# Patient Record
Sex: FEMALE | Race: WHITE | NOT HISPANIC OR LATINO | Employment: UNEMPLOYED | URBAN - METROPOLITAN AREA
[De-identification: names, ages, dates, MRNs, and addresses within clinical notes are randomized per-mention and may not be internally consistent; named-entity substitution may affect disease eponyms.]

---

## 2023-09-12 ENCOUNTER — HOSPITAL ENCOUNTER (OUTPATIENT)
Dept: DATA CONVERSION | Facility: HOSPITAL | Age: 83
End: 2023-09-12

## 2023-09-12 DIAGNOSIS — M17.12 UNILATERAL PRIMARY OSTEOARTHRITIS, LEFT KNEE: ICD-10-CM

## 2023-09-27 PROBLEM — E55.9 VITAMIN D DEFICIENCY: Status: ACTIVE | Noted: 2023-09-27

## 2023-09-27 PROBLEM — M51.37 DEGENERATIVE DISC DISEASE AT L5-S1 LEVEL: Status: ACTIVE | Noted: 2023-09-27

## 2023-09-27 PROBLEM — M54.30 SCIATICA: Status: ACTIVE | Noted: 2023-09-27

## 2023-09-27 PROBLEM — R26.9 ABNORMAL GAIT: Status: ACTIVE | Noted: 2023-09-27

## 2023-09-27 PROBLEM — G95.0 SYRINX (MULTI): Status: ACTIVE | Noted: 2023-09-27

## 2023-09-27 PROBLEM — E78.5 HYPERLIPIDEMIA: Status: ACTIVE | Noted: 2023-09-27

## 2023-09-27 PROBLEM — M17.0 PRIMARY OSTEOARTHRITIS OF BOTH KNEES: Status: ACTIVE | Noted: 2023-09-27

## 2023-09-27 PROBLEM — M89.9 DISORDER OF BONE: Status: ACTIVE | Noted: 2023-09-27

## 2023-09-27 PROBLEM — E78.00 ELEVATED LDL CHOLESTEROL LEVEL: Status: ACTIVE | Noted: 2023-09-27

## 2023-09-27 PROBLEM — M51.379 DEGENERATIVE DISC DISEASE AT L5-S1 LEVEL: Status: ACTIVE | Noted: 2023-09-27

## 2023-09-27 RX ORDER — DICLOFENAC SODIUM 10 MG/G
GEL TOPICAL
COMMUNITY
Start: 2021-04-12 | End: 2023-10-11 | Stop reason: ALTCHOICE

## 2023-09-27 RX ORDER — ERGOCALCIFEROL 1.25 MG/1
1 CAPSULE ORAL
COMMUNITY
Start: 2019-02-27 | End: 2023-10-18 | Stop reason: ALTCHOICE

## 2023-09-27 RX ORDER — DICLOFENAC SODIUM 10 MG/G
GEL TOPICAL 2 TIMES DAILY
COMMUNITY
Start: 2020-10-12 | End: 2023-10-11 | Stop reason: ALTCHOICE

## 2023-09-27 RX ORDER — MELOXICAM 7.5 MG/1
7.5 TABLET ORAL DAILY
COMMUNITY
Start: 2021-01-14 | End: 2024-02-05 | Stop reason: WASHOUT

## 2023-09-27 RX ORDER — LORATADINE 10 MG/1
10 TABLET ORAL DAILY
COMMUNITY
Start: 2021-04-12 | End: 2024-01-30

## 2023-09-27 RX ORDER — FUROSEMIDE 20 MG/1
20 TABLET ORAL DAILY
COMMUNITY
Start: 2021-01-14 | End: 2023-10-18 | Stop reason: ALTCHOICE

## 2023-10-11 ENCOUNTER — ANCILLARY PROCEDURE (OUTPATIENT)
Dept: PREADMISSION TESTING | Facility: HOSPITAL | Age: 83
End: 2023-10-11
Payer: COMMERCIAL

## 2023-10-11 VITALS
TEMPERATURE: 96.6 F | RESPIRATION RATE: 16 BRPM | DIASTOLIC BLOOD PRESSURE: 89 MMHG | OXYGEN SATURATION: 98 % | WEIGHT: 158.73 LBS | BODY MASS INDEX: 31.16 KG/M2 | HEIGHT: 60 IN | SYSTOLIC BLOOD PRESSURE: 151 MMHG | HEART RATE: 94 BPM

## 2023-10-11 DIAGNOSIS — Z01.818 PREOPERATIVE TESTING: ICD-10-CM

## 2023-10-11 DIAGNOSIS — E11.9 TYPE 2 DIABETES MELLITUS WITHOUT COMPLICATION, WITHOUT LONG-TERM CURRENT USE OF INSULIN (MULTI): ICD-10-CM

## 2023-10-11 LAB
ANION GAP SERPL CALC-SCNC: 10 MMOL/L
APPEARANCE UR: CLEAR
BILIRUB UR STRIP.AUTO-MCNC: NEGATIVE MG/DL
BUN SERPL-MCNC: 15 MG/DL (ref 8–25)
CALCIUM SERPL-MCNC: 9.4 MG/DL (ref 8.5–10.4)
CHLORIDE SERPL-SCNC: 100 MMOL/L (ref 97–107)
CO2 SERPL-SCNC: 26 MMOL/L (ref 24–31)
COLOR UR: COLORLESS
CREAT SERPL-MCNC: 0.8 MG/DL (ref 0.4–1.6)
ERYTHROCYTE [DISTWIDTH] IN BLOOD BY AUTOMATED COUNT: 14.6 % (ref 11.5–14.5)
EST. AVERAGE GLUCOSE BLD GHB EST-MCNC: 126 MG/DL
GFR SERPL CREATININE-BSD FRML MDRD: 73 ML/MIN/1.73M*2
GLUCOSE SERPL-MCNC: 115 MG/DL (ref 65–99)
GLUCOSE UR STRIP.AUTO-MCNC: NORMAL MG/DL
HBA1C MFR BLD: 6 %
HCT VFR BLD AUTO: 41.3 % (ref 36–46)
HGB BLD-MCNC: 13.2 G/DL (ref 12–16)
KETONES UR STRIP.AUTO-MCNC: NEGATIVE MG/DL
LEUKOCYTE ESTERASE UR QL STRIP.AUTO: NEGATIVE
MCH RBC QN AUTO: 27 PG (ref 26–34)
MCHC RBC AUTO-ENTMCNC: 32 G/DL (ref 32–36)
MCV RBC AUTO: 85 FL (ref 80–100)
NITRITE UR QL STRIP.AUTO: NEGATIVE
NRBC BLD-RTO: 0 /100 WBCS (ref 0–0)
PH UR STRIP.AUTO: 6.5 [PH]
PLATELET # BLD AUTO: 322 X10*3/UL (ref 150–450)
PMV BLD AUTO: 10.4 FL (ref 7.5–11.5)
POTASSIUM SERPL-SCNC: 4.2 MMOL/L (ref 3.4–5.1)
PROT UR STRIP.AUTO-MCNC: NEGATIVE MG/DL
RBC # BLD AUTO: 4.89 X10*6/UL (ref 4–5.2)
RBC # UR STRIP.AUTO: ABNORMAL /UL
RBC #/AREA URNS AUTO: NORMAL /HPF
SODIUM SERPL-SCNC: 136 MMOL/L (ref 133–145)
SP GR UR STRIP.AUTO: 1
UROBILINOGEN UR STRIP.AUTO-MCNC: NORMAL MG/DL
WBC # BLD AUTO: 5.8 X10*3/UL (ref 4.4–11.3)
WBC #/AREA URNS AUTO: NORMAL /HPF

## 2023-10-11 PROCEDURE — 87081 CULTURE SCREEN ONLY: CPT | Mod: CMCLAB,WESLAB

## 2023-10-11 PROCEDURE — 36415 COLL VENOUS BLD VENIPUNCTURE: CPT

## 2023-10-11 PROCEDURE — 81001 URINALYSIS AUTO W/SCOPE: CPT

## 2023-10-11 PROCEDURE — 85027 COMPLETE CBC AUTOMATED: CPT

## 2023-10-11 PROCEDURE — 83036 HEMOGLOBIN GLYCOSYLATED A1C: CPT

## 2023-10-11 PROCEDURE — 93005 ELECTROCARDIOGRAM TRACING: CPT

## 2023-10-11 PROCEDURE — 80048 BASIC METABOLIC PNL TOTAL CA: CPT

## 2023-10-11 PROCEDURE — 93010 ELECTROCARDIOGRAM REPORT: CPT | Performed by: INTERNAL MEDICINE

## 2023-10-11 RX ORDER — CHLORHEXIDINE GLUCONATE ORAL RINSE 1.2 MG/ML
15 SOLUTION DENTAL ONCE
Qty: 30 ML | Refills: 0 | Status: SHIPPED | OUTPATIENT
Start: 2023-10-11 | End: 2023-10-26 | Stop reason: HOSPADM

## 2023-10-11 ASSESSMENT — ENCOUNTER SYMPTOMS
PSYCHIATRIC NEGATIVE: 1
BACK PAIN: 1
EYES NEGATIVE: 1
RESPIRATORY NEGATIVE: 1
JOINT SWELLING: 1
ARTHRALGIAS: 1
ACTIVITY CHANGE: 1
CARDIOVASCULAR NEGATIVE: 1
NEUROLOGICAL NEGATIVE: 1
GASTROINTESTINAL NEGATIVE: 1

## 2023-10-11 ASSESSMENT — CHADS2 SCORE
DIABETES: YES
PRIOR STROKE OR TIA OR THROMBOEMBOLISM: NO
CHF: NO
AGE GREATER THAN OR EQUAL TO 75: YES
HYPERTENSION: NO
CHADS2 SCORE: 2

## 2023-10-11 ASSESSMENT — PAIN SCALES - GENERAL: PAINLEVEL_OUTOF10: 5 - MODERATE PAIN

## 2023-10-11 ASSESSMENT — PAIN DESCRIPTION - DESCRIPTORS: DESCRIPTORS: SHARP

## 2023-10-11 ASSESSMENT — PAIN - FUNCTIONAL ASSESSMENT: PAIN_FUNCTIONAL_ASSESSMENT: 0-10

## 2023-10-11 NOTE — PREPROCEDURE INSTRUCTIONS
PAT DISCHARGE INSTRUCTIONS    Please call the Same Day Surgery Department of the hospital where your procedure will be performed after 2:00PM the day before your surgery. If you are scheduled on a Monday or a Tuesday following a Monday holiday, you will need to call on the last business day prior to your surgery date.      [facility]  [address] [place] [address]  [phone]    [facility]  [address] [place] [address] [phone]    [facility]  [address] [place] [address]  [phone]        Please let your surgeon know if:      You develop any open sores, shingles, burning or painful urination as these may increase your risk of an infection.   You no longer wish to have the surgery.   Any other personal circumstances change that may lead to the need to cancel or defer this surgery-such as being sick or getting admitted to any hospital within one week of your planned procedure.    Your contact details change, such as a change of address or phone number.    Starting now:     Please DO NOT drink alcohol or smoke for 24 before surgery. It is well known that quitting smoking can make a huge difference to your health and recovery from surgery. The longer you abstain from smoking, the better your chances of a healthy recovery. If you need help with quitting, call 1-800-QUIT-NOW to be connected to a trained counselor who will discuss the best methods to help you quit.       Before your surgery:     Please stop all supplements 7 days prior to surgery.    Please stop taking NSAID pain medicine such as Advil and Motrin 5 days before surgery or as instructed by your surgeon.   If you develop any fever, cough, cold, rashes, cuts, scratches, scrapes, urinary symptoms or infection anywhere on your body (including teeth and gums) prior to surgery, please call your surgeon’s office as soon as possible. This may require treatment to reduce the  chance of cancellation on the day of surgery.  Please use CHG wash and mouthwash as instructed on handouts    The day before your surgery:     DIET- Do not eat any solid food or drink after midnight the night before surgery. This includes gum, hard candy, mints and coffee.    Get a good night’s rest. Use the special soap for bathing if you have been instructed to use one.    Arrival time is typically 2 hours prior to the time of surgery.    Scheduled surgery times may change and you will be notified if this occurs - please check your personal voicemail for any updates.     On the morning of surgery:   Wear comfortable, loose fitting clothes which open in the front. Please do not wear moisturizers, creams, lotions, perfume or makeup.     Please bring with you to surgery:   Photo ID and insurance card   Current list of medicines and allergies   Pacemaker/ Defibrillator/Heart stent cards   CPAP machine and mask    Slings/ splints/ crutches   A copy of your complete advanced directive/DHPOA.    Please do NOT bring with you to surgery:   All jewelry and valuables should be left at home.   Prosthetic devices such as contact lenses, hearing aids, dentures, eyelash extensions, hairpins and body piercings must be removed prior to going in to the surgical suite.      After outpatient surgery:   A responsible adult MUST accompany you at the time of discharge and stay with you for 24 hours after your surgery. You may NOT drive yourself home after surgery.    Do not drive, operate machinery, make critical decisions or do activities that require co-ordination or balance until after a night’s sleep.   Do not drink alcoholic beverages for 24 hours.   Instructions for resuming your medications will be provided by your surgeon.      CALL YOUR DOCTOR AFTER SURGERY IF YOU HAVE:     Chills and/or a fever of 101° F or higher.    Redness, swelling, pus or drainage from your surgical wound or a bad smell from the wound.    Lightheadedness,  fainting or confusion.    Persistent vomiting (throwing up) and are not able to eat or drink for 12 hours.    Three or more loose, watery bowel movements in 24 hours (diarrhea).   Difficulty or pain while urinating( after non-urological surgery)    Pain and swelling in your legs, especially if it is only on one side.    Difficulty breathing or are breathing faster than normal.    Any new concerning symptoms.

## 2023-10-11 NOTE — H&P (VIEW-ONLY)
CPM/PAT Evaluation       Name: Alexandria Rai (Alexandria Rai)  /Age: 1940/83 y.o.     In-Person       Chief Complaint: Left knee osteoarthritis    HPI  An 83 year old female with left knee osteoarthritis.  Patient primary language Yoruba, currently here with her son to help translate. History of progressive left knee pain for years that has become more severe over the past 3 years.  Symptoms increase with prolonged standing and ambulation interfering with ADLs.  Conservative treatment/injections not helping.  Denies fever, chills, or left lower extremity numbness.  She is scheduled for left total knee arthroplasty.  **clearance scheduled for 10/18/2023 with pcp    Past Medical History:   Diagnosis Date    Arthritis     DJD, sciatica    Hyperlipidemia     Hypertension     Joint pain        Past Surgical History:   Procedure Laterality Date    CATARACT EXTRACTION      VAGINAL PROLAPSE REPAIR           No Known Allergies    Prior to Admission medications    Medication Sig Start Date End Date Taking? Authorizing Provider   acetaminophen-codeine (Tylenol w/ Codeine #3) 300-30 mg tablet Take 1 tablet by mouth every 4 hours if needed (pain). 23   Historical Provider, MD   amLODIPine (Norvasc) 10 mg tablet Take 1 tablet (10 mg) by mouth once daily.    Historical Provider, MD   ibuprofen 600 mg tablet Take 1 tablet (600 mg) by mouth. EVERY 6 TO 8 HOURS AS NEEDED FOR PAIN 23   Historical Provider, MD   tamsulosin (Flomax) 0.4 mg 24 hr capsule TAKE 1 CAPSULE BY MOUTH ONCE DAILY. 23   Carlton Rinaldi MD   ALPRAZolam (Xanax) 0.5 mg tablet Take 1 tablet (0.5 mg) by mouth. 30 MINUTES PRIOR TO MRI. MAY REPEAT IF NEEDED. 8/14/23 10/9/23  Historical Provider, MD   amoxicillin (Amoxil) 500 mg capsule Take 1 capsule (500 mg) by mouth 3 times a day. 8/21/23 10/9/23  Historical Provider, MD   diazePAM (Valium) 5 mg tablet PLEASE SEE ATTACHED FOR DETAILED DIRECTIONS 8/17/23 10/9/23  Historical Provider, MD    fluocinonide 0.05 % cream 1 Application. 5/4/17 10/9/23  Historical Provider, MD   polyethylene glycol-electrolytes 420 gram solution Take by mouth. drink 1/2 beginning at 6pm night before the procedure and start drinking the 2nd 1/2 5 hours before procedure time 3/16/23 10/9/23  Historical Provider, MD   triamcinolone (Kenalog) 0.1 % cream 1 Application. 5/22/19 10/9/23  Historical Provider, MD        Review of Systems   Constitutional:  Positive for activity change.   HENT: Negative.     Eyes: Negative.    Respiratory: Negative.     Cardiovascular: Negative.    Gastrointestinal: Negative.    Genitourinary: Negative.    Musculoskeletal:  Positive for arthralgias, back pain and joint swelling.   Skin: Negative.    Neurological: Negative.    Psychiatric/Behavioral: Negative.          Physical Exam  Vitals reviewed.   Constitutional:       Appearance: Normal appearance.   HENT:      Head: Normocephalic and atraumatic.      Mouth/Throat:      Mouth: Mucous membranes are moist.   Eyes:      Pupils: Pupils are equal, round, and reactive to light.   Cardiovascular:      Rate and Rhythm: Normal rate and regular rhythm.   Pulmonary:      Breath sounds: Normal breath sounds.   Abdominal:      Palpations: Abdomen is soft.   Musculoskeletal:         General: Tenderness present.      Cervical back: Normal range of motion.      Comments: Right knee pain, antalgic gait   Skin:     General: Skin is warm and dry.   Neurological:      Mental Status: She is alert and oriented to person, place, and time.   Psychiatric:         Behavior: Behavior normal.      Comments: Turkish primary language here with son to help translate          PAT AIRWAY:   normal        Visit Vitals  /89   Pulse 94   Temp 35.9 °C (96.6 °F) (Temporal)   Resp 16         CHADS2 Score: 2        Revised Cardiac Risk Index      Flowsheet Row Most Recent Value   Revised Cardiac Risk Calculator 1              Stop Bang Score      Flowsheet Row Most Recent Value    Do you snore loudly? 1   Do you often feel tired or fatigued after your sleep? 1   Has anyone ever observed you stop breathing in your sleep? 0   Do you have or are you being treated for high blood pressure? 1   Recent BMI (Calculated) 25.2   Is BMI greater than 35 kg/m2? 0=No   Age older than 50 years old? 1=Yes   Is your neck circumference greater than 17 inches (Male) or 16 inches (Female)? 0            Assessment and Plan:   Left knee osteoarthritis  Diabetes-per record-family denies  Arthritis  Sciatica  Hyperlipidemia  Allergies  Anxiety  Hypertension

## 2023-10-11 NOTE — CPM/PAT H&P
CPM/PAT Evaluation       Name: Alexandria Rai (Alexandria Rai)  /Age: 1940/83 y.o.     In-Person       Chief Complaint: Left knee osteoarthritis    HPI  An 83 year old female with left knee osteoarthritis.  Patient primary language Frisian, currently here with her son to help translate. History of progressive left knee pain for years that has become more severe over the past 3 years.  Symptoms increase with prolonged standing and ambulation interfering with ADLs.  Conservative treatment/injections not helping.  Denies fever, chills, or left lower extremity numbness.  She is scheduled for left total knee arthroplasty.  **clearance scheduled for 10/18/2023 with pcp    Past Medical History:   Diagnosis Date    Arthritis     DJD, sciatica    Hyperlipidemia     Hypertension     Joint pain        Past Surgical History:   Procedure Laterality Date    CATARACT EXTRACTION      VAGINAL PROLAPSE REPAIR           No Known Allergies    Prior to Admission medications    Medication Sig Start Date End Date Taking? Authorizing Provider   acetaminophen-codeine (Tylenol w/ Codeine #3) 300-30 mg tablet Take 1 tablet by mouth every 4 hours if needed (pain). 23   Historical Provider, MD   amLODIPine (Norvasc) 10 mg tablet Take 1 tablet (10 mg) by mouth once daily.    Historical Provider, MD   ibuprofen 600 mg tablet Take 1 tablet (600 mg) by mouth. EVERY 6 TO 8 HOURS AS NEEDED FOR PAIN 23   Historical Provider, MD   tamsulosin (Flomax) 0.4 mg 24 hr capsule TAKE 1 CAPSULE BY MOUTH ONCE DAILY. 23   Carlton Rinaldi MD   ALPRAZolam (Xanax) 0.5 mg tablet Take 1 tablet (0.5 mg) by mouth. 30 MINUTES PRIOR TO MRI. MAY REPEAT IF NEEDED. 8/14/23 10/9/23  Historical Provider, MD   amoxicillin (Amoxil) 500 mg capsule Take 1 capsule (500 mg) by mouth 3 times a day. 8/21/23 10/9/23  Historical Provider, MD   diazePAM (Valium) 5 mg tablet PLEASE SEE ATTACHED FOR DETAILED DIRECTIONS 8/17/23 10/9/23  Historical Provider, MD    fluocinonide 0.05 % cream 1 Application. 5/4/17 10/9/23  Historical Provider, MD   polyethylene glycol-electrolytes 420 gram solution Take by mouth. drink 1/2 beginning at 6pm night before the procedure and start drinking the 2nd 1/2 5 hours before procedure time 3/16/23 10/9/23  Historical Provider, MD   triamcinolone (Kenalog) 0.1 % cream 1 Application. 5/22/19 10/9/23  Historical Provider, MD        Review of Systems   Constitutional:  Positive for activity change.   HENT: Negative.     Eyes: Negative.    Respiratory: Negative.     Cardiovascular: Negative.    Gastrointestinal: Negative.    Genitourinary: Negative.    Musculoskeletal:  Positive for arthralgias, back pain and joint swelling.   Skin: Negative.    Neurological: Negative.    Psychiatric/Behavioral: Negative.          Physical Exam  Vitals reviewed.   Constitutional:       Appearance: Normal appearance.   HENT:      Head: Normocephalic and atraumatic.      Mouth/Throat:      Mouth: Mucous membranes are moist.   Eyes:      Pupils: Pupils are equal, round, and reactive to light.   Cardiovascular:      Rate and Rhythm: Normal rate and regular rhythm.   Pulmonary:      Breath sounds: Normal breath sounds.   Abdominal:      Palpations: Abdomen is soft.   Musculoskeletal:         General: Tenderness present.      Cervical back: Normal range of motion.      Comments: Right knee pain, antalgic gait   Skin:     General: Skin is warm and dry.   Neurological:      Mental Status: She is alert and oriented to person, place, and time.   Psychiatric:         Behavior: Behavior normal.      Comments: German primary language here with son to help translate          PAT AIRWAY:   normal        Visit Vitals  /89   Pulse 94   Temp 35.9 °C (96.6 °F) (Temporal)   Resp 16         CHADS2 Score: 2        Revised Cardiac Risk Index      Flowsheet Row Most Recent Value   Revised Cardiac Risk Calculator 1              Stop Bang Score      Flowsheet Row Most Recent Value    Do you snore loudly? 1   Do you often feel tired or fatigued after your sleep? 1   Has anyone ever observed you stop breathing in your sleep? 0   Do you have or are you being treated for high blood pressure? 1   Recent BMI (Calculated) 25.2   Is BMI greater than 35 kg/m2? 0=No   Age older than 50 years old? 1=Yes   Is your neck circumference greater than 17 inches (Male) or 16 inches (Female)? 0            Assessment and Plan:   Left knee osteoarthritis  Diabetes-per record-family denies  Arthritis  Sciatica  Hyperlipidemia  Allergies  Anxiety  Hypertension

## 2023-10-13 LAB
ATRIAL RATE: 91 BPM
P AXIS: 74 DEGREES
P OFFSET: 170 MS
P ONSET: 127 MS
PR INTERVAL: 186 MS
Q ONSET: 220 MS
QRS COUNT: 15 BEATS
QRS DURATION: 92 MS
QT INTERVAL: 386 MS
QTC CALCULATION(BAZETT): 474 MS
QTC FREDERICIA: 443 MS
R AXIS: -6 DEGREES
STAPHYLOCOCCUS SPEC CULT: ABNORMAL
T AXIS: 55 DEGREES
T OFFSET: 413 MS
VENTRICULAR RATE: 91 BPM

## 2023-10-18 ENCOUNTER — OFFICE VISIT (OUTPATIENT)
Dept: PRIMARY CARE | Facility: CLINIC | Age: 83
End: 2023-10-18
Payer: COMMERCIAL

## 2023-10-18 VITALS
WEIGHT: 156 LBS | HEART RATE: 105 BPM | SYSTOLIC BLOOD PRESSURE: 130 MMHG | OXYGEN SATURATION: 98 % | DIASTOLIC BLOOD PRESSURE: 92 MMHG | BODY MASS INDEX: 30.47 KG/M2 | TEMPERATURE: 97.6 F | RESPIRATION RATE: 18 BRPM

## 2023-10-18 DIAGNOSIS — R53.83 FATIGUE, UNSPECIFIED TYPE: ICD-10-CM

## 2023-10-18 DIAGNOSIS — E55.9 VITAMIN D DEFICIENCY: Primary | ICD-10-CM

## 2023-10-18 DIAGNOSIS — M25.562 CHRONIC PAIN OF LEFT KNEE: ICD-10-CM

## 2023-10-18 DIAGNOSIS — A49.02 MRSA (METHICILLIN RESISTANT STAPHYLOCOCCUS AUREUS): ICD-10-CM

## 2023-10-18 DIAGNOSIS — G89.29 CHRONIC PAIN OF LEFT KNEE: ICD-10-CM

## 2023-10-18 PROCEDURE — 99214 OFFICE O/P EST MOD 30 MIN: CPT | Performed by: NURSE PRACTITIONER

## 2023-10-18 PROCEDURE — 1125F AMNT PAIN NOTED PAIN PRSNT: CPT | Performed by: NURSE PRACTITIONER

## 2023-10-18 PROCEDURE — 99397 PER PM REEVAL EST PAT 65+ YR: CPT | Performed by: NURSE PRACTITIONER

## 2023-10-18 PROCEDURE — 1159F MED LIST DOCD IN RCRD: CPT | Performed by: NURSE PRACTITIONER

## 2023-10-18 RX ORDER — MUPIROCIN 20 MG/G
OINTMENT TOPICAL 3 TIMES DAILY
Qty: 22 G | Refills: 0 | Status: SHIPPED | OUTPATIENT
Start: 2023-10-18 | End: 2023-10-28

## 2023-10-18 RX ORDER — SULFAMETHOXAZOLE AND TRIMETHOPRIM 800; 160 MG/1; MG/1
1 TABLET ORAL 2 TIMES DAILY
Qty: 20 TABLET | Refills: 0 | Status: SHIPPED | OUTPATIENT
Start: 2023-10-18 | End: 2023-10-18

## 2023-10-18 RX ORDER — ERGOCALCIFEROL 1.25 MG/1
50000 CAPSULE ORAL
Qty: 12 CAPSULE | Refills: 2 | Status: SHIPPED | OUTPATIENT
Start: 2023-10-18 | End: 2024-06-26

## 2023-10-18 ASSESSMENT — ENCOUNTER SYMPTOMS
ARTHRALGIAS: 1
CONSTIPATION: 1

## 2023-10-18 ASSESSMENT — PATIENT HEALTH QUESTIONNAIRE - PHQ9
SUM OF ALL RESPONSES TO PHQ9 QUESTIONS 1 AND 2: 0
2. FEELING DOWN, DEPRESSED OR HOPELESS: NOT AT ALL
1. LITTLE INTEREST OR PLEASURE IN DOING THINGS: NOT AT ALL

## 2023-10-18 ASSESSMENT — PAIN SCALES - GENERAL: PAINLEVEL: 6

## 2023-10-18 NOTE — PROGRESS NOTES
Subjective   Patient ID: Alexandria Rai is a 83 y.o. female who presents for Pre-op Exam (PT HERE FOR PRESURGICAL CLEARANCE WITH DR KINNEY ON 10/23/23. PT TESTED POSITIVE FOR MRSA 10/11/23 ).    Here with son. [place] been in [place] got home about a month ago. Went to presurgical tested positive for MRSA, has not been sick or had any cuts. Left knee surgery scheduled for Monday.          Review of Systems   Gastrointestinal:  Positive for constipation.        Eats more vegetables, has tried to increase daily water intake.    Musculoskeletal:  Positive for arthralgias and joint swelling.        Left knee pain.       Objective   BP (!) 130/92   Pulse 105   Temp 36.4 °C (97.6 °F)   Resp 18   Wt 70.8 kg (156 lb)   SpO2 98%   BMI 30.47 kg/m²     Physical Exam  Constitutional:       Appearance: Normal appearance.   HENT:      Head: Normocephalic and atraumatic.      Right Ear: Tympanic membrane, ear canal and external ear normal.      Left Ear: Tympanic membrane, ear canal and external ear normal.      Nose: Nose normal.      Mouth/Throat:      Mouth: Mucous membranes are moist.      Pharynx: Oropharynx is clear.   Eyes:      Pupils: Pupils are equal, round, and reactive to light.   Cardiovascular:      Rate and Rhythm: Normal rate and regular rhythm.      Pulses: Normal pulses.      Heart sounds: Normal heart sounds.   Pulmonary:      Effort: Pulmonary effort is normal.      Breath sounds: Normal breath sounds.   Abdominal:      General: Bowel sounds are normal.   Musculoskeletal:         General: Tenderness present.      Cervical back: Normal range of motion.      Comments: Left knee pain limited ROM.    Skin:     General: Skin is warm.      Capillary Refill: Capillary refill takes less than 2 seconds.   Neurological:      Mental Status: She is alert and oriented to person, place, and time.   Psychiatric:         Mood and Affect: Mood normal.         Assessment/Plan   Problem List Items Addressed This Visit              ICD-10-CM    Vitamin D deficiency - Primary E55.9    Relevant Medications    ergocalciferol (Vitamin D-2) 1.25 MG (81886 UT) capsule     Other Visit Diagnoses         Codes    MRSA (methicillin resistant Staphylococcus aureus)     A49.02    Relevant Medications    mupirocin (Bactroban) 2 % ointment    Fatigue, unspecified type     R53.83    Chronic pain of left knee     M25.562, G89.29    Relevant Orders    Disability Placard

## 2023-10-18 NOTE — LETTER
To who it may concern,   Alexandria Rai is having surgery October 23,2023 at [facility]. Her son Brenda Rai needs a Visa to visit his mother here in [facility]. Any questions please dont hesitate to call. [phone].                                                      Vaishali Cunha, APRN-CNP

## 2023-10-19 ASSESSMENT — ENCOUNTER SYMPTOMS: JOINT SWELLING: 1

## 2023-10-19 NOTE — PATIENT INSTRUCTIONS
Discussed with son and pt need to use bactoban three time nasally . I called dr denny office if using bactoban for 5 days ok for surgery  rest of exam ok

## 2023-10-23 ENCOUNTER — APPOINTMENT (OUTPATIENT)
Dept: RADIOLOGY | Facility: HOSPITAL | Age: 83
DRG: 470 | End: 2023-10-23
Payer: COMMERCIAL

## 2023-10-23 ENCOUNTER — ANESTHESIA EVENT (OUTPATIENT)
Dept: OPERATING ROOM | Facility: HOSPITAL | Age: 83
DRG: 470 | End: 2023-10-23
Payer: COMMERCIAL

## 2023-10-23 ENCOUNTER — ANESTHESIA (OUTPATIENT)
Dept: OPERATING ROOM | Facility: HOSPITAL | Age: 83
DRG: 470 | End: 2023-10-23
Payer: COMMERCIAL

## 2023-10-23 ENCOUNTER — HOSPITAL ENCOUNTER (INPATIENT)
Facility: HOSPITAL | Age: 83
LOS: 3 days | Discharge: NURSING, REHAB OR LONG-TERM CARE | DRG: 470 | End: 2023-10-26
Attending: STUDENT IN AN ORGANIZED HEALTH CARE EDUCATION/TRAINING PROGRAM | Admitting: STUDENT IN AN ORGANIZED HEALTH CARE EDUCATION/TRAINING PROGRAM
Payer: COMMERCIAL

## 2023-10-23 DIAGNOSIS — Z96.652 S/P TOTAL KNEE ARTHROPLASTY, LEFT: Primary | ICD-10-CM

## 2023-10-23 PROBLEM — M19.90 OSTEOARTHRITIS: Status: ACTIVE | Noted: 2023-10-23

## 2023-10-23 PROCEDURE — 7100000001 HC RECOVERY ROOM TIME - INITIAL BASE CHARGE: Performed by: STUDENT IN AN ORGANIZED HEALTH CARE EDUCATION/TRAINING PROGRAM

## 2023-10-23 PROCEDURE — 3600000017 HC OR TIME - EACH INCREMENTAL 1 MINUTE - PROCEDURE LEVEL SIX: Performed by: STUDENT IN AN ORGANIZED HEALTH CARE EDUCATION/TRAINING PROGRAM

## 2023-10-23 PROCEDURE — 2500000001 HC RX 250 WO HCPCS SELF ADMINISTERED DRUGS (ALT 637 FOR MEDICARE OP): Performed by: STUDENT IN AN ORGANIZED HEALTH CARE EDUCATION/TRAINING PROGRAM

## 2023-10-23 PROCEDURE — 3700000002 HC GENERAL ANESTHESIA TIME - EACH INCREMENTAL 1 MINUTE: Performed by: STUDENT IN AN ORGANIZED HEALTH CARE EDUCATION/TRAINING PROGRAM

## 2023-10-23 PROCEDURE — 7100000002 HC RECOVERY ROOM TIME - EACH INCREMENTAL 1 MINUTE: Performed by: STUDENT IN AN ORGANIZED HEALTH CARE EDUCATION/TRAINING PROGRAM

## 2023-10-23 PROCEDURE — 99100 ANES PT EXTEME AGE<1 YR&>70: CPT | Performed by: ANESTHESIOLOGY

## 2023-10-23 PROCEDURE — G0378 HOSPITAL OBSERVATION PER HR: HCPCS

## 2023-10-23 PROCEDURE — 1100000001 HC PRIVATE ROOM DAILY

## 2023-10-23 PROCEDURE — 3600000018 HC OR TIME - INITIAL BASE CHARGE - PROCEDURE LEVEL SIX: Performed by: STUDENT IN AN ORGANIZED HEALTH CARE EDUCATION/TRAINING PROGRAM

## 2023-10-23 PROCEDURE — A27447 PR TOTAL KNEE ARTHROPLASTY: Performed by: ANESTHESIOLOGY

## 2023-10-23 PROCEDURE — 3700000001 HC GENERAL ANESTHESIA TIME - INITIAL BASE CHARGE: Performed by: STUDENT IN AN ORGANIZED HEALTH CARE EDUCATION/TRAINING PROGRAM

## 2023-10-23 PROCEDURE — A6213 FOAM DRG >16<=48 SQ IN W/BDR: HCPCS | Performed by: STUDENT IN AN ORGANIZED HEALTH CARE EDUCATION/TRAINING PROGRAM

## 2023-10-23 PROCEDURE — 3490 HC RX 250 GENERAL PHARMACY W/ HCPCS (ALT 636 FOR OP/ED): Performed by: STUDENT IN AN ORGANIZED HEALTH CARE EDUCATION/TRAINING PROGRAM

## 2023-10-23 PROCEDURE — 93010 ELECTROCARDIOGRAM REPORT: CPT | Performed by: INTERNAL MEDICINE

## 2023-10-23 PROCEDURE — 73560 X-RAY EXAM OF KNEE 1 OR 2: CPT | Mod: LT

## 2023-10-23 PROCEDURE — 94762 N-INVAS EAR/PLS OXIMTRY CONT: CPT

## 2023-10-23 PROCEDURE — 0SRD0J9 REPLACEMENT OF LEFT KNEE JOINT WITH SYNTHETIC SUBSTITUTE, CEMENTED, OPEN APPROACH: ICD-10-PCS | Performed by: STUDENT IN AN ORGANIZED HEALTH CARE EDUCATION/TRAINING PROGRAM

## 2023-10-23 PROCEDURE — 2500000004 HC RX 250 GENERAL PHARMACY W/ HCPCS (ALT 636 FOR OP/ED): Performed by: ANESTHESIOLOGY

## 2023-10-23 PROCEDURE — 2500000004 HC RX 250 GENERAL PHARMACY W/ HCPCS (ALT 636 FOR OP/ED): Performed by: STUDENT IN AN ORGANIZED HEALTH CARE EDUCATION/TRAINING PROGRAM

## 2023-10-23 PROCEDURE — 2500000001 HC RX 250 WO HCPCS SELF ADMINISTERED DRUGS (ALT 637 FOR MEDICARE OP): Performed by: NURSE PRACTITIONER

## 2023-10-23 PROCEDURE — 2720000007 HC OR 272 NO HCPCS: Performed by: STUDENT IN AN ORGANIZED HEALTH CARE EDUCATION/TRAINING PROGRAM

## 2023-10-23 PROCEDURE — 97161 PT EVAL LOW COMPLEX 20 MIN: CPT | Mod: GP

## 2023-10-23 PROCEDURE — C1713 ANCHOR/SCREW BN/BN,TIS/BN: HCPCS | Performed by: STUDENT IN AN ORGANIZED HEALTH CARE EDUCATION/TRAINING PROGRAM

## 2023-10-23 PROCEDURE — A27447 PR TOTAL KNEE ARTHROPLASTY: Performed by: ANESTHESIOLOGIST ASSISTANT

## 2023-10-23 PROCEDURE — C1776 JOINT DEVICE (IMPLANTABLE): HCPCS | Performed by: STUDENT IN AN ORGANIZED HEALTH CARE EDUCATION/TRAINING PROGRAM

## 2023-10-23 PROCEDURE — 2500000005 HC RX 250 GENERAL PHARMACY W/O HCPCS: Performed by: ANESTHESIOLOGIST ASSISTANT

## 2023-10-23 PROCEDURE — 97165 OT EVAL LOW COMPLEX 30 MIN: CPT | Mod: GO

## 2023-10-23 PROCEDURE — 2500000004 HC RX 250 GENERAL PHARMACY W/ HCPCS (ALT 636 FOR OP/ED): Performed by: ANESTHESIOLOGIST ASSISTANT

## 2023-10-23 PROCEDURE — 2780000003 HC OR 278 NO HCPCS: Performed by: STUDENT IN AN ORGANIZED HEALTH CARE EDUCATION/TRAINING PROGRAM

## 2023-10-23 DEVICE — UNIVERSAL TIBIAL BASEPLATE
Type: IMPLANTABLE DEVICE | Site: KNEE | Status: FUNCTIONAL
Brand: TRIATHLON

## 2023-10-23 DEVICE — SIMPLEX® HV IS A FAST-SETTING ACRYLIC RESIN FOR USE IN BONE SURGERY. MIXING THE TWO SEPARATE STERILE COMPONENTS PRODUCES A DUCTILE BONE CEMENT WHICH, AFTER HARDENING, FIXES THE IMPLANT AND TRANSFERS STRESSES PRODUCED DURING MOVEMENT EVENLY TO THE BONE. SIMPLEX® HV CEMENT POWDER ALSO CONTAINS INSOLUBLE ZIRCONIUM DIOXIDE AS AN X-RAY CONTRAST MEDIUM. SIMPLEX® HV DOES NOT EMIT A SIGNAL AND DOES NOT POSE A SAFETY RISK IN A MAGNETIC RESONANCE ENVIRONMENT.
Type: IMPLANTABLE DEVICE | Site: KNEE | Status: FUNCTIONAL
Brand: SIMPLEX HV

## 2023-10-23 DEVICE — POSTERIOR STABILIZED FEMORAL
Type: IMPLANTABLE DEVICE | Site: KNEE | Status: FUNCTIONAL
Brand: TRIATHLON

## 2023-10-23 DEVICE — ASYMMETRIC PATELLA
Type: IMPLANTABLE DEVICE | Site: KNEE | Status: FUNCTIONAL
Brand: TRIATHLON

## 2023-10-23 DEVICE — FEMORAL DISTAL FIXATION PEG
Type: IMPLANTABLE DEVICE | Site: KNEE | Status: FUNCTIONAL
Brand: TRIATHLON

## 2023-10-23 DEVICE — TIBIAL BEARING INSERT - PS
Type: IMPLANTABLE DEVICE | Site: KNEE | Status: FUNCTIONAL
Brand: TRIATHLON

## 2023-10-23 RX ORDER — ASPIRIN 81 MG/1
81 TABLET ORAL 2 TIMES DAILY
Status: DISCONTINUED | OUTPATIENT
Start: 2023-10-23 | End: 2023-10-26 | Stop reason: HOSPADM

## 2023-10-23 RX ORDER — ONDANSETRON 4 MG/1
4 TABLET, ORALLY DISINTEGRATING ORAL EVERY 8 HOURS PRN
Status: DISCONTINUED | OUTPATIENT
Start: 2023-10-23 | End: 2023-10-26 | Stop reason: HOSPADM

## 2023-10-23 RX ORDER — HYDROCODONE BITARTRATE AND ACETAMINOPHEN 5; 325 MG/1; MG/1
2 TABLET ORAL EVERY 4 HOURS PRN
Status: DISCONTINUED | OUTPATIENT
Start: 2023-10-23 | End: 2023-10-26 | Stop reason: HOSPADM

## 2023-10-23 RX ORDER — PROPOFOL 10 MG/ML
INJECTION, EMULSION INTRAVENOUS AS NEEDED
Status: DISCONTINUED | OUTPATIENT
Start: 2023-10-23 | End: 2023-10-23

## 2023-10-23 RX ORDER — NALOXONE HYDROCHLORIDE 0.4 MG/ML
0.2 INJECTION, SOLUTION INTRAMUSCULAR; INTRAVENOUS; SUBCUTANEOUS EVERY 5 MIN PRN
Status: DISCONTINUED | OUTPATIENT
Start: 2023-10-23 | End: 2023-10-23

## 2023-10-23 RX ORDER — PHENYLEPHRINE HCL IN 0.9% NACL 1 MG/10 ML
SYRINGE (ML) INTRAVENOUS AS NEEDED
Status: DISCONTINUED | OUTPATIENT
Start: 2023-10-23 | End: 2023-10-23

## 2023-10-23 RX ORDER — DOCUSATE SODIUM 100 MG/1
100 CAPSULE, LIQUID FILLED ORAL 2 TIMES DAILY
Status: DISCONTINUED | OUTPATIENT
Start: 2023-10-23 | End: 2023-10-26 | Stop reason: HOSPADM

## 2023-10-23 RX ORDER — MIDAZOLAM HYDROCHLORIDE 1 MG/ML
1 INJECTION, SOLUTION INTRAMUSCULAR; INTRAVENOUS ONCE AS NEEDED
Status: DISCONTINUED | OUTPATIENT
Start: 2023-10-23 | End: 2023-10-23 | Stop reason: HOSPADM

## 2023-10-23 RX ORDER — LIDOCAINE HYDROCHLORIDE 10 MG/ML
0.1 INJECTION, SOLUTION EPIDURAL; INFILTRATION; INTRACAUDAL; PERINEURAL ONCE
Status: DISCONTINUED | OUTPATIENT
Start: 2023-10-23 | End: 2023-10-23 | Stop reason: HOSPADM

## 2023-10-23 RX ORDER — ACETAMINOPHEN 325 MG/1
650 TABLET ORAL EVERY 4 HOURS PRN
Status: DISCONTINUED | OUTPATIENT
Start: 2023-10-23 | End: 2023-10-26 | Stop reason: HOSPADM

## 2023-10-23 RX ORDER — LORATADINE 10 MG/1
10 TABLET ORAL DAILY
Status: DISCONTINUED | OUTPATIENT
Start: 2023-10-23 | End: 2023-10-26 | Stop reason: HOSPADM

## 2023-10-23 RX ORDER — CEFAZOLIN SODIUM 2 G/50ML
2 SOLUTION INTRAVENOUS EVERY 8 HOURS
Status: DISCONTINUED | OUTPATIENT
Start: 2023-10-23 | End: 2023-10-23 | Stop reason: CLARIF

## 2023-10-23 RX ORDER — CEFAZOLIN SODIUM 2 G/100ML
2 INJECTION, SOLUTION INTRAVENOUS EVERY 8 HOURS
Status: DISCONTINUED | OUTPATIENT
Start: 2023-10-23 | End: 2023-10-23

## 2023-10-23 RX ORDER — MIDAZOLAM HYDROCHLORIDE 1 MG/ML
2 INJECTION INTRAMUSCULAR; INTRAVENOUS ONCE AS NEEDED
Status: COMPLETED | OUTPATIENT
Start: 2023-10-23 | End: 2023-10-23

## 2023-10-23 RX ORDER — ONDANSETRON HYDROCHLORIDE 2 MG/ML
4 INJECTION, SOLUTION INTRAVENOUS ONCE AS NEEDED
Status: DISCONTINUED | OUTPATIENT
Start: 2023-10-23 | End: 2023-10-23 | Stop reason: HOSPADM

## 2023-10-23 RX ORDER — FENTANYL CITRATE 50 UG/ML
50 INJECTION, SOLUTION INTRAMUSCULAR; INTRAVENOUS EVERY 5 MIN PRN
Status: DISCONTINUED | OUTPATIENT
Start: 2023-10-23 | End: 2023-10-23 | Stop reason: HOSPADM

## 2023-10-23 RX ORDER — DEXAMETHASONE SODIUM PHOSPHATE 100 MG/10ML
INJECTION INTRAMUSCULAR; INTRAVENOUS AS NEEDED
Status: DISCONTINUED | OUTPATIENT
Start: 2023-10-23 | End: 2023-10-23

## 2023-10-23 RX ORDER — SODIUM CHLORIDE 9 MG/ML
100 INJECTION, SOLUTION INTRAVENOUS CONTINUOUS
Status: ACTIVE | OUTPATIENT
Start: 2023-10-23 | End: 2023-10-24

## 2023-10-23 RX ORDER — FUROSEMIDE 20 MG/1
20 TABLET ORAL DAILY
COMMUNITY
End: 2024-02-05 | Stop reason: WASHOUT

## 2023-10-23 RX ORDER — POLYETHYLENE GLYCOL 3350 17 G/17G
17 POWDER, FOR SOLUTION ORAL DAILY
Status: DISCONTINUED | OUTPATIENT
Start: 2023-10-23 | End: 2023-10-26 | Stop reason: HOSPADM

## 2023-10-23 RX ORDER — SODIUM CHLORIDE, SODIUM LACTATE, POTASSIUM CHLORIDE, CALCIUM CHLORIDE 600; 310; 30; 20 MG/100ML; MG/100ML; MG/100ML; MG/100ML
100 INJECTION, SOLUTION INTRAVENOUS CONTINUOUS
Status: DISCONTINUED | OUTPATIENT
Start: 2023-10-23 | End: 2023-10-23 | Stop reason: HOSPADM

## 2023-10-23 RX ORDER — ONDANSETRON HYDROCHLORIDE 2 MG/ML
INJECTION, SOLUTION INTRAVENOUS AS NEEDED
Status: DISCONTINUED | OUTPATIENT
Start: 2023-10-23 | End: 2023-10-23

## 2023-10-23 RX ORDER — FENTANYL CITRATE 50 UG/ML
INJECTION, SOLUTION INTRAMUSCULAR; INTRAVENOUS AS NEEDED
Status: DISCONTINUED | OUTPATIENT
Start: 2023-10-23 | End: 2023-10-23

## 2023-10-23 RX ORDER — POLYETHYLENE GLYCOL 3350 17 G/17G
17 POWDER, FOR SOLUTION ORAL DAILY PRN
Status: DISCONTINUED | OUTPATIENT
Start: 2023-10-23 | End: 2023-10-26 | Stop reason: HOSPADM

## 2023-10-23 RX ORDER — HYDROCODONE BITARTRATE AND ACETAMINOPHEN 5; 325 MG/1; MG/1
1 TABLET ORAL EVERY 4 HOURS PRN
Status: DISCONTINUED | OUTPATIENT
Start: 2023-10-23 | End: 2023-10-26 | Stop reason: HOSPADM

## 2023-10-23 RX ORDER — HYDROMORPHONE HYDROCHLORIDE 0.2 MG/ML
0.2 INJECTION INTRAMUSCULAR; INTRAVENOUS; SUBCUTANEOUS EVERY 5 MIN PRN
Status: DISCONTINUED | OUTPATIENT
Start: 2023-10-23 | End: 2023-10-23 | Stop reason: HOSPADM

## 2023-10-23 RX ORDER — FENTANYL CITRATE 50 UG/ML
50 INJECTION, SOLUTION INTRAMUSCULAR; INTRAVENOUS ONCE
Status: COMPLETED | OUTPATIENT
Start: 2023-10-23 | End: 2023-10-23

## 2023-10-23 RX ORDER — CEFAZOLIN SODIUM 2 G/100ML
2 INJECTION, SOLUTION INTRAVENOUS EVERY 8 HOURS
Status: COMPLETED | OUTPATIENT
Start: 2023-10-23 | End: 2023-10-24

## 2023-10-23 RX ORDER — VANCOMYCIN HYDROCHLORIDE 1 G/20ML
INJECTION, POWDER, LYOPHILIZED, FOR SOLUTION INTRAVENOUS AS NEEDED
Status: DISCONTINUED | OUTPATIENT
Start: 2023-10-23 | End: 2023-10-23 | Stop reason: HOSPADM

## 2023-10-23 RX ORDER — TRANEXAMIC ACID 10 MG/ML
2000 INJECTION, SOLUTION INTRAVENOUS ONCE
Status: CANCELLED | OUTPATIENT
Start: 2023-10-23 | End: 2023-10-23

## 2023-10-23 RX ORDER — LIDOCAINE HYDROCHLORIDE 10 MG/ML
INJECTION INFILTRATION; PERINEURAL AS NEEDED
Status: DISCONTINUED | OUTPATIENT
Start: 2023-10-23 | End: 2023-10-23

## 2023-10-23 RX ORDER — TRANEXAMIC ACID 100 MG/ML
INJECTION, SOLUTION INTRAVENOUS AS NEEDED
Status: DISCONTINUED | OUTPATIENT
Start: 2023-10-23 | End: 2023-10-23

## 2023-10-23 RX ORDER — ONDANSETRON HYDROCHLORIDE 2 MG/ML
4 INJECTION, SOLUTION INTRAVENOUS EVERY 8 HOURS PRN
Status: DISCONTINUED | OUTPATIENT
Start: 2023-10-23 | End: 2023-10-26 | Stop reason: HOSPADM

## 2023-10-23 RX ORDER — NALOXONE HYDROCHLORIDE 0.4 MG/ML
0.2 INJECTION, SOLUTION INTRAMUSCULAR; INTRAVENOUS; SUBCUTANEOUS EVERY 5 MIN PRN
Status: DISCONTINUED | OUTPATIENT
Start: 2023-10-23 | End: 2023-10-26 | Stop reason: HOSPADM

## 2023-10-23 RX ORDER — ALBUTEROL SULFATE 0.83 MG/ML
2.5 SOLUTION RESPIRATORY (INHALATION) ONCE
Status: DISCONTINUED | OUTPATIENT
Start: 2023-10-23 | End: 2023-10-23 | Stop reason: HOSPADM

## 2023-10-23 RX ADMIN — HYDROCODONE BITARTRATE AND ACETAMINOPHEN 2 TABLET: 5; 325 TABLET ORAL at 20:59

## 2023-10-23 RX ADMIN — FENTANYL CITRATE 50 MCG: 50 INJECTION INTRAMUSCULAR; INTRAVENOUS at 11:42

## 2023-10-23 RX ADMIN — DOCUSATE SODIUM 100 MG: 100 CAPSULE, LIQUID FILLED ORAL at 15:42

## 2023-10-23 RX ADMIN — CEFAZOLIN SODIUM 2 G: 2 INJECTION, SOLUTION INTRAVENOUS at 08:49

## 2023-10-23 RX ADMIN — Medication 100 MCG: at 09:07

## 2023-10-23 RX ADMIN — PROPOFOL 180 MG: 10 INJECTION, EMULSION INTRAVENOUS at 08:47

## 2023-10-23 RX ADMIN — POLYETHYLENE GLYCOL 3350 17 G: 17 POWDER, FOR SOLUTION ORAL at 15:51

## 2023-10-23 RX ADMIN — DEXAMETHASONE SODIUM PHOSPHATE 8 MG: 10 INJECTION INTRAMUSCULAR; INTRAVENOUS at 08:54

## 2023-10-23 RX ADMIN — FENTANYL CITRATE 25 MCG: 0.05 INJECTION, SOLUTION INTRAMUSCULAR; INTRAVENOUS at 09:26

## 2023-10-23 RX ADMIN — HYDROMORPHONE HYDROCHLORIDE 0.2 MG: 0.2 INJECTION, SOLUTION INTRAMUSCULAR; INTRAVENOUS; SUBCUTANEOUS at 12:10

## 2023-10-23 RX ADMIN — FENTANYL CITRATE 25 MCG: 0.05 INJECTION, SOLUTION INTRAMUSCULAR; INTRAVENOUS at 09:59

## 2023-10-23 RX ADMIN — SODIUM CHLORIDE, SODIUM LACTATE, POTASSIUM CHLORIDE, AND CALCIUM CHLORIDE: 600; 310; 30; 20 INJECTION, SOLUTION INTRAVENOUS at 08:39

## 2023-10-23 RX ADMIN — HYDROMORPHONE HYDROCHLORIDE 0.2 MG: 0.2 INJECTION, SOLUTION INTRAMUSCULAR; INTRAVENOUS; SUBCUTANEOUS at 12:05

## 2023-10-23 RX ADMIN — LIDOCAINE HYDROCHLORIDE 45 ML: 10 INJECTION, SOLUTION INFILTRATION; PERINEURAL at 08:47

## 2023-10-23 RX ADMIN — FENTANYL CITRATE 25 MCG: 0.05 INJECTION, SOLUTION INTRAMUSCULAR; INTRAVENOUS at 10:57

## 2023-10-23 RX ADMIN — Medication 100 MCG: at 09:01

## 2023-10-23 RX ADMIN — FENTANYL CITRATE 50 MCG: 50 INJECTION INTRAMUSCULAR; INTRAVENOUS at 11:37

## 2023-10-23 RX ADMIN — FENTANYL CITRATE 25 MCG: 0.05 INJECTION, SOLUTION INTRAMUSCULAR; INTRAVENOUS at 10:49

## 2023-10-23 RX ADMIN — LORATADINE 10 MG: 10 TABLET ORAL at 15:42

## 2023-10-23 RX ADMIN — FENTANYL CITRATE 25 MCG: 0.05 INJECTION, SOLUTION INTRAMUSCULAR; INTRAVENOUS at 11:09

## 2023-10-23 RX ADMIN — CEFAZOLIN SODIUM 2 G: 2 INJECTION, SOLUTION INTRAVENOUS at 16:25

## 2023-10-23 RX ADMIN — ONDANSETRON HYDROCHLORIDE 4 MG: 2 INJECTION INTRAMUSCULAR; INTRAVENOUS at 08:54

## 2023-10-23 RX ADMIN — TRANEXAMIC ACID 1000 MG: 100 INJECTION, SOLUTION INTRAVENOUS at 10:40

## 2023-10-23 RX ADMIN — Medication 2 L/MIN: at 13:30

## 2023-10-23 RX ADMIN — HYDROCODONE BITARTRATE AND ACETAMINOPHEN 1 TABLET: 5; 325 TABLET ORAL at 16:26

## 2023-10-23 RX ADMIN — SODIUM CHLORIDE, SODIUM LACTATE, POTASSIUM CHLORIDE, AND CALCIUM CHLORIDE: 600; 310; 30; 20 INJECTION, SOLUTION INTRAVENOUS at 10:15

## 2023-10-23 RX ADMIN — DOCUSATE SODIUM 100 MG: 100 CAPSULE, LIQUID FILLED ORAL at 21:00

## 2023-10-23 RX ADMIN — Medication 100 MCG: at 08:56

## 2023-10-23 RX ADMIN — ASPIRIN 81 MG: 81 TABLET, COATED ORAL at 15:42

## 2023-10-23 RX ADMIN — SODIUM CHLORIDE 100 ML/HR: 900 INJECTION, SOLUTION INTRAVENOUS at 13:30

## 2023-10-23 RX ADMIN — TRANEXAMIC ACID 1000 MG: 100 INJECTION, SOLUTION INTRAVENOUS at 08:54

## 2023-10-23 RX ADMIN — ASPIRIN 81 MG: 81 TABLET, COATED ORAL at 21:00

## 2023-10-23 RX ADMIN — MIDAZOLAM HYDROCHLORIDE 2 MG: 1 INJECTION, SOLUTION INTRAMUSCULAR; INTRAVENOUS at 08:27

## 2023-10-23 RX ADMIN — FENTANYL CITRATE 50 MCG: 0.05 INJECTION, SOLUTION INTRAMUSCULAR; INTRAVENOUS at 08:47

## 2023-10-23 RX ADMIN — FENTANYL CITRATE 50 MCG: 50 INJECTION INTRAMUSCULAR; INTRAVENOUS at 08:25

## 2023-10-23 RX ADMIN — FENTANYL CITRATE 25 MCG: 0.05 INJECTION, SOLUTION INTRAMUSCULAR; INTRAVENOUS at 09:32

## 2023-10-23 SDOH — SOCIAL STABILITY: SOCIAL INSECURITY: WERE YOU ABLE TO COMPLETE ALL THE BEHAVIORAL HEALTH SCREENINGS?: YES

## 2023-10-23 SDOH — SOCIAL STABILITY: SOCIAL INSECURITY: DO YOU FEEL UNSAFE GOING BACK TO THE PLACE WHERE YOU ARE LIVING?: NO

## 2023-10-23 SDOH — SOCIAL STABILITY: SOCIAL INSECURITY: DOES ANYONE TRY TO KEEP YOU FROM HAVING/CONTACTING OTHER FRIENDS OR DOING THINGS OUTSIDE YOUR HOME?: NO

## 2023-10-23 SDOH — SOCIAL STABILITY: SOCIAL INSECURITY: ARE YOU OR HAVE YOU BEEN THREATENED OR ABUSED PHYSICALLY, EMOTIONALLY, OR SEXUALLY BY ANYONE?: NO

## 2023-10-23 SDOH — SOCIAL STABILITY: SOCIAL INSECURITY: DO YOU FEEL ANYONE HAS EXPLOITED OR TAKEN ADVANTAGE OF YOU FINANCIALLY OR OF YOUR PERSONAL PROPERTY?: NO

## 2023-10-23 SDOH — SOCIAL STABILITY: SOCIAL INSECURITY: HAS ANYONE EVER THREATENED TO HURT YOUR FAMILY OR YOUR PETS?: NO

## 2023-10-23 SDOH — SOCIAL STABILITY: SOCIAL INSECURITY: HAVE YOU HAD THOUGHTS OF HARMING ANYONE ELSE?: NO

## 2023-10-23 SDOH — SOCIAL STABILITY: SOCIAL INSECURITY: ARE THERE ANY APPARENT SIGNS OF INJURIES/BEHAVIORS THAT COULD BE RELATED TO ABUSE/NEGLECT?: NO

## 2023-10-23 SDOH — SOCIAL STABILITY: SOCIAL INSECURITY: ABUSE: ADULT

## 2023-10-23 ASSESSMENT — COGNITIVE AND FUNCTIONAL STATUS - GENERAL
EATING MEALS: A LITTLE
TOILETING: A LOT
DAILY ACTIVITIY SCORE: 14
MOVING FROM LYING ON BACK TO SITTING ON SIDE OF FLAT BED WITH BEDRAILS: A LITTLE
DRESSING REGULAR UPPER BODY CLOTHING: A LOT
MOBILITY SCORE: 15
DRESSING REGULAR LOWER BODY CLOTHING: A LOT
PERSONAL GROOMING: A LITTLE
HELP NEEDED FOR BATHING: A LOT
DRESSING REGULAR UPPER BODY CLOTHING: A LOT
DAILY ACTIVITIY SCORE: 14
WALKING IN HOSPITAL ROOM: A LOT
CLIMB 3 TO 5 STEPS WITH RAILING: A LOT
MOVING FROM LYING ON BACK TO SITTING ON SIDE OF FLAT BED WITH BEDRAILS: A LITTLE
HELP NEEDED FOR BATHING: A LOT
STANDING UP FROM CHAIR USING ARMS: A LITTLE
MOVING TO AND FROM BED TO CHAIR: A LITTLE
MOBILITY SCORE: 17
CLIMB 3 TO 5 STEPS WITH RAILING: TOTAL
STANDING UP FROM CHAIR USING ARMS: A LITTLE
EATING MEALS: A LITTLE
TURNING FROM BACK TO SIDE WHILE IN FLAT BAD: A LITTLE
WALKING IN HOSPITAL ROOM: A LITTLE
TOILETING: A LOT
TURNING FROM BACK TO SIDE WHILE IN FLAT BAD: A LITTLE
PATIENT BASELINE BEDBOUND: NO
MOVING TO AND FROM BED TO CHAIR: A LITTLE
DRESSING REGULAR LOWER BODY CLOTHING: A LOT
PERSONAL GROOMING: A LITTLE

## 2023-10-23 ASSESSMENT — ACTIVITIES OF DAILY LIVING (ADL)
ADEQUATE_TO_COMPLETE_ADL: YES
ASSISTIVE_DEVICE: WALKER
GROOMING: NEEDS ASSISTANCE
BATHING_ASSISTANCE: MODERATE
JUDGMENT_ADEQUATE_SAFELY_COMPLETE_DAILY_ACTIVITIES: YES
LACK_OF_TRANSPORTATION: NO
DRESSING YOURSELF: NEEDS ASSISTANCE
ADL_ASSISTANCE: INDEPENDENT
FEEDING YOURSELF: INDEPENDENT
ADL_ASSISTANCE: NEEDS ASSISTANCE
ASSISTIVE_DEVICE: WALKER
HEARING - LEFT EAR: FUNCTIONAL
BATHING_ASSISTANCE: OTHER (COMMENT)
PATIENT'S MEMORY ADEQUATE TO SAFELY COMPLETE DAILY ACTIVITIES?: YES
BATHING: NEEDS ASSISTANCE
WALKS IN HOME: NEEDS ASSISTANCE
TOILETING: NEEDS ASSISTANCE
LACK_OF_TRANSPORTATION: NO
HEARING - RIGHT EAR: FUNCTIONAL

## 2023-10-23 ASSESSMENT — COLUMBIA-SUICIDE SEVERITY RATING SCALE - C-SSRS
6. HAVE YOU EVER DONE ANYTHING, STARTED TO DO ANYTHING, OR PREPARED TO DO ANYTHING TO END YOUR LIFE?: NO
6. HAVE YOU EVER DONE ANYTHING, STARTED TO DO ANYTHING, OR PREPARED TO DO ANYTHING TO END YOUR LIFE?: NO
2. HAVE YOU ACTUALLY HAD ANY THOUGHTS OF KILLING YOURSELF?: NO
1. IN THE PAST MONTH, HAVE YOU WISHED YOU WERE DEAD OR WISHED YOU COULD GO TO SLEEP AND NOT WAKE UP?: NO
2. HAVE YOU ACTUALLY HAD ANY THOUGHTS OF KILLING YOURSELF?: NO
1. IN THE PAST MONTH, HAVE YOU WISHED YOU WERE DEAD OR WISHED YOU COULD GO TO SLEEP AND NOT WAKE UP?: NO

## 2023-10-23 ASSESSMENT — PAIN - FUNCTIONAL ASSESSMENT
PAIN_FUNCTIONAL_ASSESSMENT: 0-10
PAIN_FUNCTIONAL_ASSESSMENT: FLACC (FACE, LEGS, ACTIVITY, CRY, CONSOLABILITY)
PAIN_FUNCTIONAL_ASSESSMENT: 0-10
PAIN_FUNCTIONAL_ASSESSMENT: 0-10

## 2023-10-23 ASSESSMENT — PATIENT HEALTH QUESTIONNAIRE - PHQ9
1. LITTLE INTEREST OR PLEASURE IN DOING THINGS: NOT AT ALL
2. FEELING DOWN, DEPRESSED OR HOPELESS: NOT AT ALL
SUM OF ALL RESPONSES TO PHQ9 QUESTIONS 1 & 2: 0

## 2023-10-23 ASSESSMENT — PAIN DESCRIPTION - DESCRIPTORS
DESCRIPTORS: BURNING
DESCRIPTORS: BURNING

## 2023-10-23 ASSESSMENT — PAIN SCALES - GENERAL
PAINLEVEL_OUTOF10: 10 - WORST POSSIBLE PAIN
PAINLEVEL_OUTOF10: 7
PAINLEVEL_OUTOF10: 10 - WORST POSSIBLE PAIN
PAINLEVEL_OUTOF10: 10 - WORST POSSIBLE PAIN
PAINLEVEL_OUTOF10: 5 - MODERATE PAIN
PAINLEVEL_OUTOF10: 10 - WORST POSSIBLE PAIN
PAINLEVEL_OUTOF10: 2
PAINLEVEL_OUTOF10: 2
PAINLEVEL_OUTOF10: 5 - MODERATE PAIN
PAINLEVEL_OUTOF10: 0 - NO PAIN
PAIN_LEVEL: 1
PAINLEVEL_OUTOF10: 4
PAINLEVEL_OUTOF10: 5 - MODERATE PAIN
PAINLEVEL_OUTOF10: 2
PAINLEVEL_OUTOF10: 2

## 2023-10-23 ASSESSMENT — LIFESTYLE VARIABLES
HOW OFTEN DO YOU HAVE 6 OR MORE DRINKS ON ONE OCCASION: NEVER
SKIP TO QUESTIONS 9-10: 1
HOW OFTEN DO YOU HAVE A DRINK CONTAINING ALCOHOL: NEVER
AUDIT-C TOTAL SCORE: 0
AUDIT-C TOTAL SCORE: 0
HOW MANY STANDARD DRINKS CONTAINING ALCOHOL DO YOU HAVE ON A TYPICAL DAY: PATIENT DOES NOT DRINK

## 2023-10-23 NOTE — PROGRESS NOTES
Alexandria Rai is an 83 y.o. female who is having surgery for LEFT KNEE OSTEOARTHRITIS.     Patient and daughter seen at bedside. Patient currently lives with her daughter and family, lives on the lower level and stairs that she uses. Daughter prefers that her mom goes to skilled rehab. Was given choices and prefers [facility]. Has a supportive family. Referral sent through [facility].     Mila Mendoza RN

## 2023-10-23 NOTE — PERIOPERATIVE NURSING NOTE
Dr. Whipple at bedside to discuss preop block with patient and family.  Family translates procedure to patient and family.  Family questions asked and answered by Dr. Whipple

## 2023-10-23 NOTE — CARE PLAN
Problem: OT Goals  Goal: ADLs  Description: Patient will complete ADL tasks with Close Supervision, using AE as needed, in order to increase patient's safety and independence with self-care tasks.  Outcome: Progressing  Goal: B UE Strength  Description: Patient will increase B UE strength to 4+/5 for functional transfers.  Outcome: Progressing  Goal: Functional Transfers  Description: Patient will complete functional transfers with Mod I in order to increase safety and independence with self-care tasks.  Outcome: Progressing  Goal: Precautions  Description: Patient will be able to recall precautions 100% for safety with daily tasks.  Outcome: Progressing

## 2023-10-23 NOTE — PROGRESS NOTES
"Physical Therapy    Physical Therapy Evaluation    Patient Name: Alexandria Rai  MRN: [id number]  Today's Date: 10/23/2023   Time Calculation  Start Time: 1421  Stop Time: 1439  Time Calculation (min): 18 min    Assessment/Plan   PT Assessment  PT Assessment Results: Decreased strength, Decreased range of motion, Decreased endurance, Impaired balance, Decreased mobility, Decreased safety awareness  Rehab Prognosis: Good  Medical Staff Made Aware: Yes  Barriers to Participation: Language  End of Session Communication: Bedside nurse  Assessment Comment: 84 y/o female who is s/p L TKA and presents wtih decreased L knee ROM, decreased strength, impaired mobility and decreased activity tolerance.  End of Session Patient Position: Bed, 3 rail up, Alarm off, not on at start of session  IP OR SWING BED PT PLAN  Inpatient or Swing Bed: Inpatient  PT Plan  Treatment/Interventions: Bed mobility, Transfer training, Gait training, Stair training, Balance training, Strengthening, Endurance training, Therapeutic exercise, Range of motion, Therapeutic activity  PT Plan: Skilled PT  PT Frequency: BID  PT Discharge Recommendations: Moderate intensity level of continued care. Pt's family reporting, \"we were hoping she would go to rehab from here because our house is all stairs.\"   Equipment Recommended upon Discharge: Wheeled walker  PT Recommended Transfer Status: Assist x1, Assistive device  PT - OK to Discharge: Yes      Subjective   General Visit Information:  General  Reason for Referral: recent surgery  Referred By: Dr. Prasad  Past Medical History Relevant to Rehab: 84 y/o female who presents s/p L TKA with Dr. Prasad on 10/23/23. PMH includes arthritis, HLD, HTN. Italian is pt's primary spoken language.  Family/Caregiver Present: Yes  Caregiver Feedback: daughter in law at bedside to assist with translation  Co-Treatment: OT  Co-Treatment Reason: safe mobility  Prior to Session Communication: Bedside nurse  Patient Position " "Received: Bed, 3 rail up, Alarm off, not on at start of session  Preferred Learning Style: visual (English is not primary language)  General Comment: Supine in bed upon arrival. 2 L O2, PIV.  Home Living:  Home Living  Type of Home: House  Lives With: Adult children (son and daughter in law)  Home Adaptive Equipment: Walker rolling or standard, Cane  Home Layout: Multi-level  Home Access: Stairs to enter with rails  Entrance Stairs-Number of Steps: 3 (10 stairs to lower level in which patient's bedroom and bathroom is)  Bathroom Shower/Tub: Walk-in shower  Bathroom Equipment: Built-in shower seat, Shower chair with back  Home Living Comments: Pt lives in lower level of family's house. Pt's daughter in law reported, \"no matter where she goes, she has to do stairs.\"  Prior Level of Function:  Prior Function Per Pt/Caregiver Report  Level of [place]: Independent with ADLs and functional transfers  Receives Help From: Family  ADL Assistance: Independent  Homemaking Assistance: Independent  Ambulatory Assistance: Independent  Prior Function Comments: Primarily independent iwth mobility up until recently. Recent use of cane vs walker. Family has been having to assist more oftern with ADLs/IADls as of recent.  Precautions:  Precautions  LE Weight Bearing Status: Weight Bearing as Tolerated (LLE)  Medical Precautions: Fall precautions  Post-Surgical Precautions: Left total knee precautions  Vital Signs:     Objective   Pain:  Pain Assessment  Pain Assessment: FLACC (Face, Legs, Activity, Cry, Consolability)  Pain Score: 2  Pain Type: Surgical pain  Pain Location: Knee  Pain Orientation: Left  Cognition:  Cognition  Overall Cognitive Status:  (Alert and cooperative. Language barrier; difficulty following commands as a result. Family member at bedside providing translation.)    General Assessments:  General Observation  General Observation: L knee ace bandaged post-op     Activity Tolerance  Endurance: Decreased " tolerance for upright activites    Sensation  Light Touch: No apparent deficits       Postural Control  Posture Comment: flexed posture    Static Sitting Balance  Static Sitting-Level of Assistance: Contact guard, Close supervision    Static Standing Balance  Static Standing-Level of Assistance: Minimum assistance  Static Standing-Comment/Number of Minutes: UE support of walker  Functional Assessments:  Bed Mobility  Bed Mobility: Yes  Bed Mobility 1  Bed Mobility 1: Supine to sitting  Level of Assistance 1: Minimum assistance  Bed Mobility Comments 1: HOB elevated. Repeated cueing (visual and tactile) in order for pt to follow command due to language barrier  Bed Mobility 2  Bed Mobility  2: Sitting to supine  Level of Assistance 2: Minimum assistance  Bed Mobility Comments 2: repeated cueing for technique. Assist primarily to elevate LEs back onto bed.    Transfers  Transfer: Yes  Transfer 1  Technique 1: Sit to stand, Stand to sit  Transfer Device 1: Walker  Transfer Level of Assistance 1: Minimum assistance, +2  Trials/Comments 1: x 2 trials from elevated surface    Ambulation/Gait Training  Ambulation/Gait Training Performed: Yes  Ambulation/Gait Training 1  Surface 1: Level tile  Device 1: Rolling walker  Assistance 1: Moderate assistance, Moderate tactile cues  Quality of Gait 1:  (decreased step heigth and length, decrease WBing LLE, minimal foot clearance, flexed posture.)  Comments/Distance (ft) 1: lateral sidesteps R  Extremity/Trunk Assessments:  RLE   RLE : Within Functional Limits  LLE   LLE : Exceptions to WFL  AROM LLE (degrees)  LLE AROM Comment: L knee about 70 degrees flex sitting on EOB  Strength LLE  LLE Overall Strength:  (L quad about 3/5 with ability to perform LAQ sitting on EOB. [place] buckling with WBing.)  Outcome Measures:  [facility] Basic Mobility  Turning from your back to your side while in a flat bed without using bedrails: A little  Moving from lying on your back to sitting on the side  of a flat bed without using bedrails: A little  Moving to and from bed to chair (including a wheelchair): A little  Standing up from a chair using your arms (e.g. wheelchair or bedside chair): A little  To walk in hospital room: A lot  Climbing 3-5 steps with railing: Total  Basic Mobility - Total Score: 15    Encounter Problems       Encounter Problems (Active)       Balance       Standing (Progressing)       Start:  10/23/23    Expected End:  10/30/23       Pt will perform dynamic activities with supervision and no LOB            Mobility       LTG - Patient will navigate 4-6 steps with rails/device (Progressing)       Start:  10/23/23    Expected End:  10/30/23            bed mobility (Progressing)       Start:  10/23/23    Expected End:  10/30/23       Pt will perform sup to/from sit transfers with supervision         ambulation (Progressing)       Start:  10/23/23    Expected End:  10/30/23       Pt will amb > 100 ft with wheeled walker and mod independence             Transfers       sit to stand (Progressing)       Start:  10/23/23    Expected End:  10/30/23       Pt will perform sit to stand transfer with wheeled walker and mod independence.                 Education Documentation  Precautions, taught by Adrienne Munroe PT at 10/23/2023  3:27 PM.  Learner: Patient, Family  Readiness: Acceptance  Method:   Response: Needs Reinforcement    Mobility Training, taught by Adrienne Munroe PT at 10/23/2023  3:27 PM.  Learner: Patient, Family  Readiness: Acceptance  Method:   Response: Needs Reinforcement    Education Comments  No comments found.

## 2023-10-23 NOTE — ANESTHESIA PROCEDURE NOTES
Peripheral Block    Patient location during procedure: pre-op  Start time: 10/23/2023 8:00 AM  End time: 10/23/2023 8:15 AM  Reason for block: at surgeon's request and post-op pain management  Staffing  Performed: attending   Authorized by: Mathew Whipple DO    Performed by: Mathew Whipple DO  Preanesthetic Checklist  Completed: patient identified, IV checked, site marked, risks and benefits discussed, surgical consent, monitors and equipment checked, pre-op evaluation and timeout performed   Timeout performed at:   Peripheral Block  Block type: adductor canal  Injection technique: single-shot

## 2023-10-23 NOTE — PROGRESS NOTES
Occupational Therapy    Evaluation    Patient Name: Alexandria Rai  MRN: [id number]  Today's Date: 10/23/2023  Time Calculation  Start Time: 1419  Stop Time: 1437  Time Calculation (min): 18 min    Assessment  IP OT Assessment  OT Assessment: patient is presenting with a decline in strength, balance, and activity tolerance, resulting in an increased need for assistance with ADL tasks. skilled OT to address the above deficits and increase patient's safety and independence with self-care tasks.  Prognosis: Good  Barriers to Discharge: Other (Comment) (language barrier)  Evaluation/Treatment Tolerance: Patient tolerated treatment well  End of Session Communication: Bedside nurse  End of Session Patient Position: Bed, 3 rail up (daughter in law present in room)  Plan:  Treatment Interventions: ADL retraining, Functional transfer training, UE strengthening/ROM, Endurance training, Patient/family training, Compensatory technique education, Neuromuscular reeducation  OT Frequency: 4 times per week  OT Discharge Recommendations: Moderate intensity level of continued care    Subjective   Current Problem:  1. S/P total knee arthroplasty, left          General:  General  Reason for Referral: decline in ADLs, strength, balance, and activity tolerance  Referred By: Dr. Prasad  Past Medical History Relevant to Rehab: Patient is an 83 year old female admitted s/p L TKA. PMH: HLD, sciatica  Family/Caregiver Present: Yes  Caregiver Feedback: daughter in law. translates for patient  Co-Treatment: PT  Co-Treatment Reason: safety post OP  Prior to Session Communication: Bedside nurse  Patient Position Received: Bed, 3 rail up  Preferred Learning Style: Other:(comment) (patient's primary language is Kinyarwanda. her DIL translates at time of eval)  General Comment: patient in bed upon arrival and agreeable to participate  Precautions:  LE Weight Bearing Status: Weight Bearing as Tolerated  Medical Precautions: Fall precautions  Post-Surgical  Precautions: Left total knee precautions    Pain:  Pain Assessment  Pain Assessment: 0-10  Pain Score: 2  Pain Type: Surgical pain  Pain Location: Knee  Pain Orientation: Left  Pain Descriptors: Burning  Pain Interventions: Cold applied, Repositioned    Objective     Home Living:  Type of Home: House  Lives With: Other (Comment) (son and daughter in law)  Home Adaptive Equipment: Walker rolling or standard, Cane  Home Layout: Multi-level, Other (Comment), Stairs to alternate level with rails (patient lives in lower level of house)  Alternate Level Stairs-Number of Steps: 10  Home Access: Stairs to enter with rails  Entrance Stairs-Number of Steps: 4  Bathroom Shower/Tub: Walk-in shower  Bathroom Equipment: Grab bars in shower, Built-in shower seat, Shower chair with back   Prior Function:  Level of [place]: Needs assistance with ADLs, Needs assistance with homemaking  Receives Help From: Other (Comment) (son and daughter in law)  ADL Assistance: Needs assistance  Bath: Other (Comment) (assist for bathing)  Dressing: Other (comment) (assist for dressing)  Homemaking Assistance: Needs assistance  Ambulatory Assistance: Needs assistance  Transfers: Assistive device  Gait: Assistive device  Hand Dominance: Left  Prior Function Comments: patient's son and DIL are either always home  IADL History:  Homemaking Responsibilities: No  IADL Comments: family completes  ADL:  Eating Assistance: Independent  Eating Deficit: Setup  Grooming Assistance: Minimal  Grooming Deficit: Increased time to complete, Steadying, Supervision/safety (per clinical judgement)  Bathing Assistance: Moderate  Bathing Deficit: Right lower leg including foot, Left lower leg including foot, Buttocks, Perineal area, Increased time to complete , Supervision/safety, Steadying (per clinical judgement)  UE Dressing Assistance: Minimal  UE Dressing Deficit: Supervision/safety, Increased time to complete, Thread RUE, Thread LUE (per clinical  judgement)  LE Dressing Assistance: Total  LE Dressing Deficit: Don/doff R sock, Don/doff L sock, Thread RLE into pants, Thread LLE into pants, Thread RLE into underwear, Thread LLE into underwear, Pull up over hips (per clinical judgement)  Toileting Assistance with Device: Maximal  Toileting Deficit: Perineal hygiene, Clothing management up, Clothing management down (per clinical judgement)  Activity Tolerance:     Bed Mobility/Transfers: Bed Mobility  Bed Mobility: Yes  Bed Mobility 1  Bed Mobility 1: Supine to sitting  Level of Assistance 1: Minimum assistance  Bed Mobility Comments 1: head of bed elevated, use of bed rails  Bed Mobility 2  Bed Mobility  2: Sitting to supine  Level of Assistance 2: Minimum assistance  Bed Mobility Comments 2: assist for L LE   and Transfers  Transfer: Yes  Transfer 1  Transfer From 1: Bed to  Transfer to 1: Stand  Technique 1: Sit to stand  Transfer Device 1: Walker  Transfer Level of Assistance 1: Minimum assistance  Trials/Comments 1: x2 trials. side steps with Mod A towards the head of bed    IADL's:   Homemaking Responsibilities: No  IADL Comments: family completes    Strength:  Strength Comments: B UE at least >/ 3+/5 grossly    Extremities: RUE   RUE : Within Functional Limits and LUE   LUE: Within Functional Limits    Outcome Measures: [facility] Daily Activity  Putting on and taking off regular lower body clothing: A lot  Bathing (including washing, rinsing, drying): A lot  Putting on and taking off regular upper body clothing: A lot  Toileting, which includes using toilet, bedpan or urinal: A lot  Taking care of personal grooming such as brushing teeth: A little  Eating Meals: A little  Daily Activity - Total Score: 14      Education Documentation  Body Mechanics, taught by Estelle Crow OT at 10/23/2023  3:22 PM.  Learner: Patient  Readiness: Acceptance  Method: Explanation, Demonstration  Response: Needs Reinforcement    Precautions, taught by Estelle Crow OT at  10/23/2023  3:22 PM.  Learner: Patient  Readiness: Acceptance  Method: Explanation, Demonstration  Response: Needs Reinforcement    Education Comments  No comments found.      Goals:   Encounter Problems       Encounter Problems (Active)       OT Goals       ADLs (Progressing)       Start:  10/23/23    Expected End:  11/06/23       Patient will complete ADL tasks with Close Supervision, using AE as needed, in order to increase patient's safety and independence with self-care tasks.         B UE Strength (Progressing)       Start:  10/23/23    Expected End:  11/06/23       Patient will increase B UE strength to 4+/5 for functional transfers.         Functional Transfers (Progressing)       Start:  10/23/23    Expected End:  11/06/23       Patient will complete functional transfers with Mod I in order to increase safety and independence with self-care tasks.         Precautions (Progressing)       Start:  10/23/23    Expected End:  11/06/23       Patient will be able to recall precautions 100% for safety with daily tasks.

## 2023-10-23 NOTE — ANESTHESIA PROCEDURE NOTES
Airway  Date/Time: 10/23/2023 8:49 AM  Urgency: elective    Airway not difficult    Staffing  Performed: GABRIEL   Authorized by: Mathew Whipple DO    Performed by: TAYLOR Graves  Patient location during procedure: OR    Indications and Patient Condition  Indications for airway management: anesthesia and airway protection  Spontaneous ventilation: present  Sedation level: deep  Preoxygenated: yes  Patient position: sniffing  Mask difficulty assessment: 0 - not attempted  Planned trial extubation    Final Airway Details  Final airway type: supraglottic airway      Successful airway: Size 4     Number of attempts at approach: 1  Number of other approaches attempted: 0

## 2023-10-23 NOTE — NURSING NOTE
Patient OOB sitting in chair with family at bedside. Call light within reach. Voices no needs or concerns at this time. Will continue plan of care.

## 2023-10-23 NOTE — ANESTHESIA POSTPROCEDURE EVALUATION
Patient: Alexandria Rai    Procedure Summary       Date: 10/23/23 Room / Location: [facility] / [facility]    Anesthesia Start: 0839 Anesthesia Stop: 1127    Procedure: LEFT TOTAL KNEE  ARTHROPLASTY (Left: Knee) Diagnosis: (LEFT KNEE OSTEOARTHRITIS)    Surgeons: Declan Prasad MD Responsible Provider: Mathew Whipple DO    Anesthesia Type: general ASA Status: 2            Anesthesia Type: general    Vitals Value Taken Time   /110 10/23/23 1241   Temp 36.5 °C (97.7 °F) 10/23/23 1124   Pulse 93 10/23/23 1244   Resp 14 10/23/23 1244   SpO2 98 % 10/23/23 1245   Vitals shown include unvalidated device data.    Anesthesia Post Evaluation    Patient location during evaluation: bedside  Patient participation: complete - patient participated  Level of consciousness: awake  Pain score: 1  Pain management: adequate  Cardiovascular status: acceptable  Respiratory status: acceptable        No notable events documented.

## 2023-10-23 NOTE — CARE PLAN
Problem: Balance  Goal: Standing  Description: Pt will perform dynamic activities with supervision and no LOB  Outcome: Progressing     Problem: Mobility  Goal: LTG - Patient will navigate 4-6 steps with rails/device  Outcome: Progressing  Goal: bed mobility  Description: Pt will perform sup to/from sit transfers with supervision  Outcome: Progressing  Goal: ambulation  Description: Pt will amb > 100 ft with wheeled walker and mod independence   Outcome: Progressing     Problem: Transfers  Goal: sit to stand  Description: Pt will perform sit to stand transfer with wheeled walker and mod independence.   Outcome: Progressing

## 2023-10-23 NOTE — ANESTHESIA PREPROCEDURE EVALUATION
Patient: Alexandria Rai    Procedure Information       Date/Time: 10/23/23 3630    Procedure: LEFT TOTAL KNEE  ARTHROPLASTY (Left: Knee) - ASSISTANT X2, FARRUKH   *PRE-OP BLOCK*    Location: [facility] / [facility]    Surgeons: Declan Prasad MD            Relevant Problems   Cardiovascular   (+) Hyperlipidemia      Neuro/Psych   (+) Sciatica      Musculoskeletal   (+) Degenerative disc disease at L5-S1 level   (+) Primary osteoarthritis of both knees       Clinical information reviewed:   Tobacco  Allergies  Meds  Problems  Med Hx  Surg Hx   Fam Hx  Soc   Hx        NPO Detail:  NPO/Void Status  Date of Last Liquid: 10/22/23  Time of Last Liquid: 2200  Date of Last Solid: 10/22/23  Time of Last Solid: 2000  Last Intake Type: Solid meal  Time of Last Void: 0000 (prior to arrival)         Physical Exam    Airway  Mallampati: II  TM distance: >3 FB     Cardiovascular    Dental - normal exam     Pulmonary    Abdominal            Anesthesia Plan    ASA 2     general     intravenous induction   Anesthetic plan and risks discussed with patient and spouse.    Plan discussed with CAA.

## 2023-10-23 NOTE — CONSULTS
Inpatient consult to Medicine  Consult performed by: Sherrie Dillard, BIB-CNP  Consult ordered by: Declan Prasad MD  Reason for consult: hypertension          Reason For Consult  Hypertension    History Of Present Illness  Alexandria Rai is a 83 y.o. female with a past medical history of hypertension and osteoarthritis who presented to [facility] for an anticipated left total knee arthroplasty. Patient had been having progressive left knee pain for years however became more severe over the past three years. Noted difficulty standing and ambulating. She underent left total knee arthroplasty by Dr. Prasad on 10/23/23. Denies chest pain, shortness of breath, fevers, chills, nausea, or vomiting. No abdominal discomfort. Does report mild surgical pain. Patient does speak minimal English, primary language is Faroese. Daughter in law in at bedside. Medicine consulted for blood pressure management.         Past Medical History  She has a past medical history of Arthritis, Hyperlipidemia, Hypertension, and Joint pain.    Surgical History  She has a past surgical history that includes Vaginal prolapse repair and Cataract extraction.     Social History  She reports that she has never smoked. She has never used smokeless tobacco. She reports that she does not drink alcohol and does not use drugs.    Family History  Family History   Problem Relation Name Age of Onset    Diabetes Mother      Arthritis Mother          Allergies  Patient has no known allergies.    Review of Systems  General: Denies chills, fever, or difficulty sleeping.   HEENT: Denies cough, sore throat, or nasal congestion.   Cardiovascular: Denies chest pain or palpitations.   Respiratory: Denies feeling short of breath. Denies cough.  Gastrointestinal: Denies abdominal pain. No nausea, vomiting, diarrhea, or constipation.  : No difficulty urinating.   Neurological: Denies any episodes of loss of consciousness.   Musculoskeletal: Positive  difficulty ambulating and standing      At least ten systems reviewed and are otherwise negative       Physical Exam  General: Well developed. NAD.  HEENT: PERRL. EOMI. Pink conjunctiva. Trachea midline.  Cardiovascular: S1, S2. RRR. No edema.  Respiratory: Breath sounds clear bilaterally, posterior anterior chest. No cyanosis. No hypoxia.  GI: Abdomen soft, nontender. Bowel sounds present in all quadrants .   : No bladder distention.  MS: Left surgical knee  Neuro: Alert, oriented. No numbness or paresthesia.   Skin: Warm, dry. Surgical dressing intact.        Last Recorded Vitals  /84 (BP Location: Left arm, Patient Position: Lying)   Pulse 101   Temp 36.1 °C (97 °F) (Temporal)   Resp 18   Wt 70.8 kg (156 lb) Comment: per chart  SpO2 96%     Relevant Results  Lab Results   Component Value Date    GLUCOSE 115 (H) 10/11/2023    CALCIUM 9.4 10/11/2023     10/11/2023    K 4.2 10/11/2023    CO2 26 10/11/2023     10/11/2023    BUN 15 10/11/2023    CREATININE 0.80 10/11/2023     Lab Results   Component Value Date    WBC 5.8 10/11/2023    HGB 13.2 10/11/2023    HCT 41.3 10/11/2023    MCV 85 10/11/2023     10/11/2023     XR knee left 1-2 views    Result Date: 10/23/2023  Interpreted By:  Samuel Higgins, STUDY: XR KNEE LEFT 1-2 VIEWS; ;  10/23/2023 12:40 pm   INDICATION: Signs/Symptoms:Post-op knee.   COMPARISON: 06/01/2022.   ACCESSION NUMBER(S): [id number]   ORDERING CLINICIAN: JEREMIAS KINNEY   FINDINGS: Post total left knee arthroplasty. Hardware appears intact. Subcutaneous air extending along the soft tissues of the thigh and knee joint. Additional soft tissue thickening/edema around the knee joint.       Expected postsurgical changes from total left knee arthroplasty.     MACRO: None   Signed by: Samuel Higgins 10/23/2023 1:00 PM Dictation workstation:   [id number]    ECG 12 lead (Clinic Performed)    Result Date: 10/13/2023  Poor data quality, interpretation may be adversely affected Normal  sinus rhythm Moderate voltage criteria for LVH, may be normal variant Borderline ECG When compared with ECG of 11-OCT-2023 09:41, (unconfirmed) No significant change was found Confirmed by Jake Johnson ([id number]) on 10/13/2023 1:31:05 PM        Assessment/Plan     Hypertension  States she takes furosemide at home, unsure of dose  Will hold furosemide post op, may resume when she returns home    Osteoarthritis of knee  S/p left total knee arthroplasty by Dr. Prasad on 10/23/23  Pain management  Incentive spirometer  Bowel regimen  Management per surgery    Hyperlipidemia  Not on cholesterol medicine  Recommend lifestyle and dietary modifications  Follow up with PCP    Plan  Admitted to orthopedic surgery  Pain management/bowel regimen  Encourage incentive spirometer  PT/OT/OOB  Include family in plan of care  CBC and BMP in the morning    Thank  you so much for this consult. Will follow as needed.     Sherrie Dillard, APRN-CNP

## 2023-10-23 NOTE — PERIOPERATIVE NURSING NOTE
Patient has language barrier.  Son Surendra at bedside to translate.  Family declines use of Devora for interpretation

## 2023-10-23 NOTE — OP NOTE
LEFT TOTAL KNEE  ARTHROPLASTY (L) Operative Note     Date: 10/23/2023  OR Location: [facility]    Name: Alexandria Rai, : 1940, Age: 83 y.o., MRN: [id number], Sex: female    Diagnosis  * No Diagnosis Codes entered * * No Diagnosis Codes entered *     Procedures    * LEFT TOTAL KNEE  ARTHROPLASTY    Surgeons      * Declan Prasad - Primary    Resident/Fellow/Other Assistant:  Surgeon(s) and Role:    Procedure Summary  Anesthesia: General  ASA: II  Anesthesia Staff: Anesthesiologist: Mathew Whipple DO  C-AA: TAYLOR Graves  Estimated Blood Loss: 50mL  Intra-op Medications:   Medication Name Total Dose   vancomycin (Vancocin) vial for injection 2 g   ceFAZolin in dextrose (iso-os) (Ancef) IVPB 2 g 2 g   fentaNYL PF (Sublimaze) injection 50 mcg 50 mcg   midazolam (Versed) injection 2 mg 2 mg              Anesthesia Record               Intraprocedure I/O Totals          Intake    LR 1200.00 mL    Phenylephrine Drip 0.00 mL    The total shown is the total volume documented since Anesthesia Start was filed.    Total Intake 1200 mL          Specimen: No specimens collected     Staff:   Circulator: Michael Husain RN  Relief Circulator: Jung Singer RN  Scrub Person: Abigail Sanabria; Eber Price         Drains and/or Catheters: * None in log *    Tourniquet Times:   * Missing tourniquet times found for documented tourniquets in lo *     Implants:  Implants       Type Name Action Serial No.      Implant CEMENT, BONE, SIMPLEX HV FULL DOSE  40 GM - [id number] Implanted      Implant CEMENT, BONE, SIMPLEX HV FULL DOSE  40 GM - [id number] Implanted      Joint PEG, FEMORAL DISTAL FIXATION - [id number] Implanted      Joint FEM COMP, TRIATH BERNICE PS P 4 LEFT - [id number] Implanted      Joint PATELLA, TRIATHLON, ASYMMETRIC X3, SZ-A32 10MM - [id number] Implanted      Joint BASEPLATE, TIBIA 3 TS TRIATH - [id number] Implanted      Joint INSERT, TIBIAL, X3 TRIATHLON PS, SZ-3 9MM - [id number] Implanted               Findings:  See op note    Indications: Alexandria Rai is an 83 y.o. female who is having surgery for LEFT KNEE OSTEOARTHRITIS.     The patient was seen in the preoperative area. The risks, benefits, complications, treatment options, non-operative alternatives, expected recovery and outcomes were discussed with the patient. The possibilities of reaction to medication, pulmonary aspiration, injury to surrounding structures, bleeding, recurrent infection, the need for additional procedures, failure to diagnose a condition, and creating a complication requiring transfusion or operation were discussed with the patient. The patient concurred with the proposed plan, giving informed consent.  The site of surgery was properly noted/marked if necessary per policy. The patient has been actively warmed in preoperative area. Preoperative antibiotics have been ordered and given within 1 hours of incision. Venous thrombosis prophylaxis have been ordered including unilateral sequential compression device    Procedure Details: DESCRIPTION OF PROCEDURE: The patient was transferred to the operative suite after appropriate preoperative preparation and site marking. Preoperative intravenous antibiotics and tranexamic acid were administered as indicated. Anesthesia was induced.  The knee was prepared in the usual sterile fashion, draped in the usual sterile fashion and incision marked. The tourniquet was inflated to appropriate pressure. Incision was made in the midline. Medial parapatellar approach was utilized. The fat pad was removed, patella was everted, and gentle medial dissection was performed along the proximal tibia. Eburnated bone was noted in more than one compartment. The knee was flexed and the femoral starting point was identified medial to Stacia´s line, anterior to the PCL insertion, and the intramedullary guide was utilized to cut the femur in 5 degrees valgus. Next, the knee was maximally flexed and the posterior cruciate  retractor was utilized to retract the tibia forward and the remaining menisci were removed. The intra medullary tibial guide was placed in line with the tibia and was used to cut the tibia at the appropriate depth and slope based on preoperative planning. Soft tissues were protected throughout the cuts.  The bone fragments were removed and tibial osteophytes were removed.  We then placed our size 9 millimeter spacer block in extension which had good varus and valgus stability.  Neck the femoral sizing guide was utilized and showed the listed size above was appropriate size. The finishing cutting guide was then utilized with the axis set at 3 degrees external based on the epicondylar axis. The finishing cuts were made with the soft tissue protected, bone fragments were removed, and remaining femoral osteophytes were removed.  The baseplate tibial trial was then placed in appropriate rotation with the guide just at the medial one third of the tibial tubercle and the punch and peg were used. The trial was placed and femur prepared for the component. Trialing was performed, and after appropriate soft tissue balancing showed there was excellent equality of flexion/extension gaps and excellent varus/valgus stability throughout a range of motion with the above size spacer. The patella was then treated as indicated above and instrumented based on the amount of patellar wear. Next, the patellar tracking was seen to be excellent with no thumbs.  A lateral release was not necessary.  Next, thorough irrigation was performed and the final implants were cemented into place. The cement was allowed to cure and excess cement was removed. The final spacer was placed. This snapped in quite nicely and, again, motions were excellent and stability was excellent throughout a range of motion from 0 to 125 degrees. Thorough irrigation was performed and the wound was closed using #1 Vicryl sutures in the parapatellar approach along with #1  Stratafix, 2-0 monocryl sutures in the subcutaneous skin, and 3-0 V-Loc used in the cutaneous skin. Prineo dressing with Derma kenney was used for wound sealant and sterile dressings were applied when the Dermabond was dry. The patient was awakened and transferred to recovery room in stable condition.     PLAN: The patient will be weightbearing as tolerated on the operative lower extremity.  Aspirin 81 milligrams twice a day for deep vein thrombosis prophylaxis.  Standard postoperative knee replacement protocols will be followed.  Complications:  None; patient tolerated the procedure well.    Disposition: PACU - hemodynamically stable.  Condition: stable         Additional Details: None    Attending Attestation: I was present and scrubbed for the entire procedure.    Declan Prasad  Phone Number: [phone]

## 2023-10-23 NOTE — NURSING NOTE
Patient arrived to floor in stable conditio, BLE pulses/strength equal and regular, patient on 2L via nasa cannula, dressing dry and intact, ice to left knee. Patient states pain to left knee 2-3/10. Patient speaks little english, family at bedside translating in Frisian. Call light within reach. Will continue plan of care.

## 2023-10-23 NOTE — NURSING NOTE
Agree with previous assessment. Patient awake resting in bed watching TV. Family at bedside. Voices no needs or concerns at this time. Call light within reach. Will continue plan of care.

## 2023-10-24 LAB
ANION GAP SERPL CALC-SCNC: 7 MMOL/L
BUN SERPL-MCNC: 13 MG/DL (ref 8–25)
CALCIUM SERPL-MCNC: 8.1 MG/DL (ref 8.5–10.4)
CHLORIDE SERPL-SCNC: 102 MMOL/L (ref 97–107)
CO2 SERPL-SCNC: 25 MMOL/L (ref 24–31)
CREAT SERPL-MCNC: 0.7 MG/DL (ref 0.4–1.6)
ERYTHROCYTE [DISTWIDTH] IN BLOOD BY AUTOMATED COUNT: 14.6 % (ref 11.5–14.5)
GFR SERPL CREATININE-BSD FRML MDRD: 86 ML/MIN/1.73M*2
GLUCOSE BLD MANUAL STRIP-MCNC: 110 MG/DL (ref 74–99)
GLUCOSE BLD MANUAL STRIP-MCNC: 134 MG/DL (ref 74–99)
GLUCOSE BLD MANUAL STRIP-MCNC: 168 MG/DL (ref 74–99)
GLUCOSE SERPL-MCNC: 120 MG/DL (ref 65–99)
HCT VFR BLD AUTO: 33.1 % (ref 36–46)
HGB BLD-MCNC: 10.5 G/DL (ref 12–16)
IRON SATN MFR SERPL: 10 % (ref 12–50)
IRON SERPL-MCNC: 23 UG/DL (ref 30–160)
MCH RBC QN AUTO: 27.2 PG (ref 26–34)
MCHC RBC AUTO-ENTMCNC: 31.7 G/DL (ref 32–36)
MCV RBC AUTO: 86 FL (ref 80–100)
NRBC BLD-RTO: 0 /100 WBCS (ref 0–0)
PLATELET # BLD AUTO: 240 X10*3/UL (ref 150–450)
PMV BLD AUTO: 10.3 FL (ref 7.5–11.5)
POTASSIUM SERPL-SCNC: 3.7 MMOL/L (ref 3.4–5.1)
RBC # BLD AUTO: 3.86 X10*6/UL (ref 4–5.2)
SODIUM SERPL-SCNC: 134 MMOL/L (ref 133–145)
TIBC SERPL-MCNC: 224 UG/DL (ref 228–428)
UIBC SERPL-MCNC: 201 UG/DL (ref 110–370)
WBC # BLD AUTO: 8.9 X10*3/UL (ref 4.4–11.3)

## 2023-10-24 PROCEDURE — 83550 IRON BINDING TEST: CPT | Performed by: NURSE PRACTITIONER

## 2023-10-24 PROCEDURE — 2500000004 HC RX 250 GENERAL PHARMACY W/ HCPCS (ALT 636 FOR OP/ED): Performed by: NURSE PRACTITIONER

## 2023-10-24 PROCEDURE — 1100000001 HC PRIVATE ROOM DAILY

## 2023-10-24 PROCEDURE — 97116 GAIT TRAINING THERAPY: CPT | Mod: GP,CQ

## 2023-10-24 PROCEDURE — 99222 1ST HOSP IP/OBS MODERATE 55: CPT | Performed by: NURSE PRACTITIONER

## 2023-10-24 PROCEDURE — 3490 HC RX 250 GENERAL PHARMACY W/ HCPCS (ALT 636 FOR OP/ED): Performed by: STUDENT IN AN ORGANIZED HEALTH CARE EDUCATION/TRAINING PROGRAM

## 2023-10-24 PROCEDURE — 83540 ASSAY OF IRON: CPT | Performed by: NURSE PRACTITIONER

## 2023-10-24 PROCEDURE — 2500000001 HC RX 250 WO HCPCS SELF ADMINISTERED DRUGS (ALT 637 FOR MEDICARE OP): Performed by: STUDENT IN AN ORGANIZED HEALTH CARE EDUCATION/TRAINING PROGRAM

## 2023-10-24 PROCEDURE — 36415 COLL VENOUS BLD VENIPUNCTURE: CPT | Performed by: STUDENT IN AN ORGANIZED HEALTH CARE EDUCATION/TRAINING PROGRAM

## 2023-10-24 PROCEDURE — 80048 BASIC METABOLIC PNL TOTAL CA: CPT | Performed by: NURSE PRACTITIONER

## 2023-10-24 PROCEDURE — 97110 THERAPEUTIC EXERCISES: CPT | Mod: GP,CQ

## 2023-10-24 PROCEDURE — 85027 COMPLETE CBC AUTOMATED: CPT | Performed by: STUDENT IN AN ORGANIZED HEALTH CARE EDUCATION/TRAINING PROGRAM

## 2023-10-24 PROCEDURE — 2500000001 HC RX 250 WO HCPCS SELF ADMINISTERED DRUGS (ALT 637 FOR MEDICARE OP): Performed by: NURSE PRACTITIONER

## 2023-10-24 PROCEDURE — 97535 SELF CARE MNGMENT TRAINING: CPT | Mod: GO,CO

## 2023-10-24 PROCEDURE — 82947 ASSAY GLUCOSE BLOOD QUANT: CPT

## 2023-10-24 PROCEDURE — 2500000004 HC RX 250 GENERAL PHARMACY W/ HCPCS (ALT 636 FOR OP/ED): Performed by: STUDENT IN AN ORGANIZED HEALTH CARE EDUCATION/TRAINING PROGRAM

## 2023-10-24 PROCEDURE — 36415 COLL VENOUS BLD VENIPUNCTURE: CPT | Performed by: NURSE PRACTITIONER

## 2023-10-24 RX ADMIN — CEFAZOLIN SODIUM 2 G: 2 INJECTION, SOLUTION INTRAVENOUS at 08:39

## 2023-10-24 RX ADMIN — LORATADINE 10 MG: 10 TABLET ORAL at 08:39

## 2023-10-24 RX ADMIN — DOCUSATE SODIUM 100 MG: 100 CAPSULE, LIQUID FILLED ORAL at 08:38

## 2023-10-24 RX ADMIN — HYDROCODONE BITARTRATE AND ACETAMINOPHEN 2 TABLET: 5; 325 TABLET ORAL at 03:43

## 2023-10-24 RX ADMIN — CEFAZOLIN SODIUM 2 G: 2 INJECTION, SOLUTION INTRAVENOUS at 00:38

## 2023-10-24 RX ADMIN — HYDROCODONE BITARTRATE AND ACETAMINOPHEN 2 TABLET: 5; 325 TABLET ORAL at 23:38

## 2023-10-24 RX ADMIN — HYDROCODONE BITARTRATE AND ACETAMINOPHEN 2 TABLET: 5; 325 TABLET ORAL at 13:24

## 2023-10-24 RX ADMIN — POLYETHYLENE GLYCOL 3350 17 G: 17 POWDER, FOR SOLUTION ORAL at 08:38

## 2023-10-24 RX ADMIN — ASPIRIN 81 MG: 81 TABLET, COATED ORAL at 20:46

## 2023-10-24 RX ADMIN — HYDROCODONE BITARTRATE AND ACETAMINOPHEN 2 TABLET: 5; 325 TABLET ORAL at 18:48

## 2023-10-24 RX ADMIN — ASPIRIN 81 MG: 81 TABLET, COATED ORAL at 08:38

## 2023-10-24 RX ADMIN — IRON SUCROSE 200 MG: 20 INJECTION, SOLUTION INTRAVENOUS at 20:46

## 2023-10-24 RX ADMIN — DOCUSATE SODIUM 100 MG: 100 CAPSULE, LIQUID FILLED ORAL at 20:46

## 2023-10-24 RX ADMIN — HYDROCODONE BITARTRATE AND ACETAMINOPHEN 2 TABLET: 5; 325 TABLET ORAL at 08:39

## 2023-10-24 ASSESSMENT — COGNITIVE AND FUNCTIONAL STATUS - GENERAL
DRESSING REGULAR UPPER BODY CLOTHING: A LITTLE
WALKING IN HOSPITAL ROOM: A LITTLE
CLIMB 3 TO 5 STEPS WITH RAILING: TOTAL
TURNING FROM BACK TO SIDE WHILE IN FLAT BAD: A LITTLE
MOVING TO AND FROM BED TO CHAIR: A LITTLE
DAILY ACTIVITIY SCORE: 20
DRESSING REGULAR LOWER BODY CLOTHING: A LITTLE
DAILY ACTIVITIY SCORE: 20
MOVING FROM LYING ON BACK TO SITTING ON SIDE OF FLAT BED WITH BEDRAILS: A LITTLE
PERSONAL GROOMING: A LITTLE
TOILETING: A LITTLE
MOVING FROM LYING ON BACK TO SITTING ON SIDE OF FLAT BED WITH BEDRAILS: A LITTLE
TURNING FROM BACK TO SIDE WHILE IN FLAT BAD: A LITTLE
DRESSING REGULAR LOWER BODY CLOTHING: A LOT
MOBILITY SCORE: 16
DAILY ACTIVITIY SCORE: 20
DRESSING REGULAR UPPER BODY CLOTHING: A LITTLE
MOVING FROM LYING ON BACK TO SITTING ON SIDE OF FLAT BED WITH BEDRAILS: A LITTLE
STANDING UP FROM CHAIR USING ARMS: A LITTLE
STANDING UP FROM CHAIR USING ARMS: A LITTLE
WALKING IN HOSPITAL ROOM: A LITTLE
DRESSING REGULAR LOWER BODY CLOTHING: A LITTLE
HELP NEEDED FOR BATHING: A LITTLE
WALKING IN HOSPITAL ROOM: A LITTLE
MOVING TO AND FROM BED TO CHAIR: A LITTLE
TOILETING: A LITTLE
TOILETING: A LITTLE
MOVING TO AND FROM BED TO CHAIR: A LITTLE
HELP NEEDED FOR BATHING: A LITTLE
HELP NEEDED FOR BATHING: A LITTLE
MOVING FROM LYING ON BACK TO SITTING ON SIDE OF FLAT BED WITH BEDRAILS: A LITTLE
STANDING UP FROM CHAIR USING ARMS: A LITTLE
HELP NEEDED FOR BATHING: A LITTLE
MOBILITY SCORE: 16
MOVING TO AND FROM BED TO CHAIR: A LITTLE
CLIMB 3 TO 5 STEPS WITH RAILING: TOTAL
WALKING IN HOSPITAL ROOM: A LITTLE
CLIMB 3 TO 5 STEPS WITH RAILING: A LOT
TURNING FROM BACK TO SIDE WHILE IN FLAT BAD: A LITTLE
MOBILITY SCORE: 16
DRESSING REGULAR UPPER BODY CLOTHING: A LITTLE
TURNING FROM BACK TO SIDE WHILE IN FLAT BAD: A LITTLE
DRESSING REGULAR LOWER BODY CLOTHING: A LITTLE
MOBILITY SCORE: 16
TURNING FROM BACK TO SIDE WHILE IN FLAT BAD: A LITTLE
TOILETING: A LITTLE
MOBILITY SCORE: 17
WALKING IN HOSPITAL ROOM: A LITTLE
STANDING UP FROM CHAIR USING ARMS: A LITTLE
DAILY ACTIVITIY SCORE: 18
STANDING UP FROM CHAIR USING ARMS: A LITTLE
MOVING TO AND FROM BED TO CHAIR: A LITTLE
MOVING FROM LYING ON BACK TO SITTING ON SIDE OF FLAT BED WITH BEDRAILS: A LITTLE
CLIMB 3 TO 5 STEPS WITH RAILING: TOTAL
CLIMB 3 TO 5 STEPS WITH RAILING: TOTAL
DRESSING REGULAR UPPER BODY CLOTHING: A LITTLE

## 2023-10-24 ASSESSMENT — PAIN - FUNCTIONAL ASSESSMENT
PAIN_FUNCTIONAL_ASSESSMENT: WONG-BAKER FACES
PAIN_FUNCTIONAL_ASSESSMENT: 0-10
PAIN_FUNCTIONAL_ASSESSMENT: WONG-BAKER FACES
PAIN_FUNCTIONAL_ASSESSMENT: 0-10
PAIN_FUNCTIONAL_ASSESSMENT: 0-10

## 2023-10-24 ASSESSMENT — ENCOUNTER SYMPTOMS
AGITATION: 0
WEAKNESS: 0
CONFUSION: 0
NUMBNESS: 0
DIAPHORESIS: 0
CHILLS: 0
DIARRHEA: 0
CHEST TIGHTNESS: 0
LIGHT-HEADEDNESS: 0
TREMORS: 0
ABDOMINAL PAIN: 0
ABDOMINAL DISTENTION: 0
FATIGUE: 0
SHORTNESS OF BREATH: 0
JOINT SWELLING: 1
NAUSEA: 0
COUGH: 0
FEVER: 1
BACK PAIN: 1
VOMITING: 0
ACTIVITY CHANGE: 1
WHEEZING: 0
BRUISES/BLEEDS EASILY: 0
CONSTIPATION: 1
BLOOD IN STOOL: 0
COLOR CHANGE: 0
WOUND: 0
DIZZINESS: 0
HEADACHES: 0
PALPITATIONS: 0

## 2023-10-24 ASSESSMENT — PAIN SCALES - GENERAL
PAINLEVEL_OUTOF10: 8
PAINLEVEL_OUTOF10: 6
PAINLEVEL_OUTOF10: 8
PAINLEVEL_OUTOF10: 8
PAINLEVEL_OUTOF10: 5 - MODERATE PAIN
PAINLEVEL_OUTOF10: 4
PAINLEVEL_OUTOF10: 4
PAINLEVEL_OUTOF10: 3
PAINLEVEL_OUTOF10: 5 - MODERATE PAIN
PAINLEVEL_OUTOF10: 8
PAINLEVEL_OUTOF10: 0 - NO PAIN

## 2023-10-24 ASSESSMENT — PAIN SCALES - WONG BAKER
WONGBAKER_NUMERICALRESPONSE: HURTS LITTLE BIT
WONGBAKER_NUMERICALRESPONSE: HURTS WHOLE LOT

## 2023-10-24 NOTE — CARE PLAN
The patient's goals for the shift include      The clinical goals for the shift include assist with pain contol      Problem: Daily Care  Goal: Daily care needs are met  Outcome: Progressing     Problem: Safety  Goal: I will remain free of falls  Outcome: Progressing     Problem: Safety  Goal: Patient will be injury free during hospitalization  Outcome: Progressing     Problem: Pain  Goal: My pain/discomfort is manageable  Outcome: Progressing

## 2023-10-24 NOTE — CARE PLAN
The patient's goals for the shift include  pain control.    The clinical goals for the shift include pain control, monitor vital sign, work with PT/OT, and improved activity tolerance        10/24/23 at 1:22 AM - Caryn French RN

## 2023-10-24 NOTE — PROGRESS NOTES
Alexandria Rai is a 83 y.o. female on day 1 of admission presenting with S/P total knee arthroplasty, left.     Patient and son seen in room 442, explained that [facility] accepted patient and now we are awaiting precert.     PLAN Discharge to [facility] awaiting precert

## 2023-10-24 NOTE — PROGRESS NOTES
Physical Therapy    Physical Therapy Treatment    Patient Name: Alexandria Rai  MRN: [id number]  Today's Date: 10/24/2023  Time Calculation  Start Time: 1422  Stop Time: 1455  Time Calculation (min): 33 min       Assessment/Plan   PT Assessment  PT Assessment Results: Decreased strength, Decreased range of motion, Decreased endurance, Impaired balance, Decreased mobility, Decreased safety awareness  Rehab Prognosis: Good  Medical Staff Made Aware: Yes  Barriers to Participation: Language  End of Session Communication: Bedside nurse  Assessment Comment: 82 y/o female who is s/p L TKA and presents wtih decreased L knee ROM, decreased strength, impaired mobility and decreased activity tolerance.  End of Session Patient Position: Up in chair  PT Plan  Inpatient/Swing Bed or Outpatient: Inpatient  PT Plan  Treatment/Interventions: Bed mobility, Transfer training, Gait training, Therapeutic exercise  PT Plan: Skilled PT  PT Frequency: BID  PT Discharge Recommendations: Moderate intensity level of continued care  Equipment Recommended upon Discharge: Wheeled walker  PT Recommended Transfer Status: Assist x1  PT - OK to Discharge: Yes      General Visit Information:   PT  Visit  PT Received On: 10/24/23  General  Prior to Session Communication: Bedside nurse  Patient Position Received: Bed, 3 rail up  Preferred Learning Style: verbal, visual  General Comment: Pt supine in bed. Cleared per nurse to see pt for PT. Pt agreeable. Son present to translate.    Subjective   Precautions:  Precautions  LE Weight Bearing Status: Weight Bearing as Tolerated (LLE)  Medical Precautions: Fall precautions  Post-Surgical Precautions: Left total knee precautions  Vital Signs:       Objective   Pain:  Pain Assessment  Pain Assessment: 0-10  Pain Score: 6  Pain Location: Knee  Pain Orientation: Left  Pain Interventions: Cold pack  Cognition:     Postural Control:     Extremity/Trunk Assessments:    Activity Tolerance:      Treatments:  Therapeutic Exercise  Therapeutic Exercise Performed: Yes  Therapeutic Exercise Activity 1: Ankle pumps/heel raises, LAQ, seated hip flexion, dima hip  adduction, resisted hip abduction x15 bilat. Seated heel slides left LE x15.    Bed Mobility  Bed Mobility: Yes  Bed Mobility 1  Bed Mobility 1: Supine to sitting  Level of Assistance 1: Minimum assistance  Bed Mobility Comments 1: Pt needed light min assist with left LE off bed supine to sitting.    Ambulation/Gait Training  Ambulation/Gait Training Performed: Yes  Ambulation/Gait Training 1  Surface 1: Level tile  Device 1: Rolling walker  Assistance 1: Minimum assistance, Minimal verbal cues  Comments/Distance (ft) 1: Pt ambulated with RW ~40' x2 min assist of 1. Pt demonstrating slow, step to gait pattern sequencing. Mod antalgic gait. Pt needed verbal cues x3 not to push walker too far ahead.  Transfers  Transfer: Yes  Transfer 1  Transfer From 1: Sit to  Transfer to 1: Stand  Technique 1: Sit to stand  Transfer Device 1: Walker  Transfer Level of Assistance 1: Minimum assistance  Trials/Comments 1: Pt needed verbal cues for safe hand placement sit to stand at walker.  Transfers 2  Transfer From 2: Stand to  Transfer to 2: Sit, Chair with arms  Technique 2: Stand to sit  Transfer Level of Assistance 2: Minimum assistance  Trials/Comments 2: Verbal cues to reach back for chair arms stand to sit.    Outcome Measures:  [facility] Basic Mobility  Turning from your back to your side while in a flat bed without using bedrails: A little  Moving from lying on your back to sitting on the side of a flat bed without using bedrails: A little  Moving to and from bed to chair (including a wheelchair): A little  Standing up from a chair using your arms (e.g. wheelchair or bedside chair): A little  To walk in hospital room: A little  Climbing 3-5 steps with railing: Total  Basic Mobility - Total Score: 16    Education Documentation  Handouts, taught by Monse Wyatt,  DEEP at 10/24/2023  3:17 PM.  Learner: Patient  Readiness: Acceptance  Method: Explanation, Demonstration  Response: Verbalizes Understanding, Needs Reinforcement    Precautions, taught by Monse Wyatt PTA at 10/24/2023  3:17 PM.  Learner: Patient  Readiness: Acceptance  Method: Explanation, Demonstration  Response: Verbalizes Understanding, Needs Reinforcement    Body Mechanics, taught by Monse Wyatt PTA at 10/24/2023  3:17 PM.  Learner: Patient  Readiness: Acceptance  Method: Explanation, Demonstration  Response: Verbalizes Understanding, Needs Reinforcement    Home Exercise Program, taught by Monse Wyatt PTA at 10/24/2023  3:17 PM.  Learner: Patient  Readiness: Acceptance  Method: Explanation, Demonstration  Response: Verbalizes Understanding, Needs Reinforcement    Mobility Training, taught by Monse Wyatt PTA at 10/24/2023  3:17 PM.  Learner: Patient  Readiness: Acceptance  Method: Explanation, Demonstration  Response: Verbalizes Understanding, Needs Reinforcement    Handouts, taught by Monse Wyatt PTA at 10/24/2023 10:06 AM.  Learner: Family, Patient  Readiness: Acceptance  Method: Explanation, Demonstration  Response: Verbalizes Understanding, Needs Reinforcement    Precautions, taught by Monse Wyatt PTA at 10/24/2023 10:06 AM.  Learner: Family, Patient  Readiness: Acceptance  Method: Explanation, Demonstration  Response: Verbalizes Understanding, Needs Reinforcement    Body Mechanics, taught by Monse Wyatt PTA at 10/24/2023 10:06 AM.  Learner: Family, Patient  Readiness: Acceptance  Method: Explanation, Demonstration  Response: Verbalizes Understanding, Needs Reinforcement    Home Exercise Program, taught by Monse Wyatt PTA at 10/24/2023 10:06 AM.  Learner: Family, Patient  Readiness: Acceptance  Method: Explanation, Demonstration  Response: Verbalizes Understanding, Needs Reinforcement    Mobility Training, taught by Monse Wyatt PTA at 10/24/2023 10:06 AM.  Learner: Family,  Patient  Readiness: Acceptance  Method: Explanation, Demonstration  Response: Verbalizes Understanding, Needs Reinforcement    Education Comments  No comments found.        OP EDUCATION:  Education  Individual(s) Educated: Patient  Education Provided: Post-Op Precautions  Diagnosis and Precautions: TKA precautions, WBAT.  Patient/Caregiver Demonstrated Understanding: yes  Plan of Care Discussed and Agreed Upon: yes  Patient Response to Education: Patient/Caregiver Verbalized Understanding of Information    Encounter Problems       Encounter Problems (Active)       Balance       Standing (Progressing)       Start:  10/23/23    Expected End:  10/30/23       Pt will perform dynamic activities with supervision and no LOB            Mobility       LTG - Patient will navigate 4-6 steps with rails/device (Progressing)       Start:  10/23/23    Expected End:  10/30/23            bed mobility (Progressing)       Start:  10/23/23    Expected End:  10/30/23       Pt will perform sup to/from sit transfers with supervision         ambulation (Progressing)       Start:  10/23/23    Expected End:  10/30/23       Pt will amb > 100 ft with wheeled walker and mod independence             Transfers       sit to stand (Progressing)       Start:  10/23/23    Expected End:  10/30/23       Pt will perform sit to stand transfer with wheeled walker and mod independence.

## 2023-10-24 NOTE — CONSULTS
Consults    Reason For Consult  Post op anemia    History Of Present Illness  Alexandria Rai is a 83 y.o. female presenting for elective left TKA secondary OA. Pt primary language is Mongolian, son at bedside to assist with history/translation. Pt reports knee pain for years becoming more severe past 3 years, increased pain with standing/ambulation, relief of pain at rest. Post op anemia H/H 10.5/33.1 with iron deficient FE 23, %Tsat 10.     Past Medical History  She has a past medical history of Arthritis, Hyperlipidemia, Hypertension, and Joint pain.    Surgical History  She has a past surgical history that includes Vaginal prolapse repair and Cataract extraction.     Social History  She reports that she has never smoked. She has never used smokeless tobacco. She reports that she does not drink alcohol and does not use drugs. From home with her son    Family History  Family History   Problem Relation Name Age of Onset    Diabetes Mother      Arthritis Mother          Allergies  Patient has no known allergies.    Review of Systems   Constitutional:  Positive for activity change and fever. Negative for chills, diaphoresis and fatigue.   HENT:  Negative for nosebleeds.    Eyes:  Negative for visual disturbance.   Respiratory:  Negative for cough, chest tightness, shortness of breath and wheezing.    Cardiovascular:  Positive for leg swelling. Negative for chest pain and palpitations.        Intermittent trace lower ext   Gastrointestinal:  Positive for constipation (chronic constipation,). Negative for abdominal distention, abdominal pain, blood in stool, diarrhea, nausea and vomiting.   Musculoskeletal:  Positive for back pain and joint swelling.   Skin:  Negative for color change, pallor, rash and wound.   Neurological:  Negative for dizziness, tremors, weakness, light-headedness, numbness and headaches.   Hematological:  Does not bruise/bleed easily.   Psychiatric/Behavioral:  Negative for agitation, behavioral  problems and confusion.         Physical Exam  Constitutional:       Appearance: Normal appearance.   HENT:      Head: Normocephalic and atraumatic.      Right Ear: External ear normal.      Left Ear: External ear normal.      Nose: Nose normal.      Mouth/Throat:      Mouth: Mucous membranes are moist.   Eyes:      Extraocular Movements: Extraocular movements intact.      Conjunctiva/sclera: Conjunctivae normal.      Pupils: Pupils are equal, round, and reactive to light.   Cardiovascular:      Rate and Rhythm: Regular rhythm. Tachycardia present.      Heart sounds: Normal heart sounds.      Comments: Tachy, rate 116bpm  Pulmonary:      Effort: Pulmonary effort is normal. No respiratory distress.      Breath sounds: Normal breath sounds. No wheezing, rhonchi or rales.   Chest:      Chest wall: No tenderness.   Abdominal:      General: Abdomen is flat. Bowel sounds are normal. There is no distension.      Palpations: Abdomen is soft.      Tenderness: There is no abdominal tenderness.   Musculoskeletal:         General: Tenderness (left knee tender) present.      Cervical back: Normal range of motion and neck supple.   Skin:     General: Skin is warm and dry.      Capillary Refill: Capillary refill takes 2 to 3 seconds.      Coloration: Skin is not pale.      Findings: Rash present. No bruising, erythema or lesion.      Comments: Ace wrap dry/intact left leg, ice to left knee   Neurological:      General: No focal deficit present.      Mental Status: She is alert and oriented to person, place, and time. Mental status is at baseline.   Psychiatric:         Mood and Affect: Mood normal.         Behavior: Behavior normal.         Thought Content: Thought content normal.          Last Recorded Vitals  Blood pressure 125/63, pulse 89, temperature 35.9 °C (96.6 °F), temperature source Temporal, resp. rate 16, height 1.524 m (5'), weight 77 kg (169 lb 12.1 oz), SpO2 97 %.    Relevant Results  Results for orders placed or  performed during the hospital encounter of 10/23/23 (from the past 24 hour(s))   CBC   Result Value Ref Range    WBC 8.9 4.4 - 11.3 x10*3/uL    nRBC 0.0 0.0 - 0.0 /100 WBCs    RBC 3.86 (L) 4.00 - 5.20 x10*6/uL    Hemoglobin 10.5 (L) 12.0 - 16.0 g/dL    Hematocrit 33.1 (L) 36.0 - 46.0 %    MCV 86 80 - 100 fL    MCH 27.2 26.0 - 34.0 pg    MCHC 31.7 (L) 32.0 - 36.0 g/dL    RDW 14.6 (H) 11.5 - 14.5 %    Platelets 240 150 - 450 x10*3/uL    MPV 10.3 7.5 - 11.5 fL   Basic Metabolic Panel   Result Value Ref Range    Glucose 120 (H) 65 - 99 mg/dL    Sodium 134 133 - 145 mmol/L    Potassium 3.7 3.4 - 5.1 mmol/L    Chloride 102 97 - 107 mmol/L    Bicarbonate 25 24 - 31 mmol/L    Urea Nitrogen 13 8 - 25 mg/dL    Creatinine 0.70 0.40 - 1.60 mg/dL    eGFR 86 >60 mL/min/1.73m*2    Calcium 8.1 (L) 8.5 - 10.4 mg/dL    Anion Gap 7 <=19 mmol/L   Iron and TIBC   Result Value Ref Range    Iron 23 (L) 30 - 160 ug/dL    UIBC 201 110 - 370 ug/dL    TIBC 224 (L) 228 - 428 ug/dL    % Saturation 10 (L) 12 - 50 %   POCT GLUCOSE   Result Value Ref Range    POCT Glucose 110 (H) 74 - 99 mg/dL      Current Facility-Administered Medications:     acetaminophen (Tylenol) tablet 650 mg, 650 mg, oral, q4h PRN, Declan Prasad MD    aspirin EC tablet 81 mg, 81 mg, oral, BID, Declan Prasad MD, 81 mg at 10/24/23 0838    docusate sodium (Colace) capsule 100 mg, 100 mg, oral, BID, Sherrie Dillard, APRN-CNP, 100 mg at 10/24/23 0838    HYDROcodone-acetaminophen (Norco) 5-325 mg per tablet 1 tablet, 1 tablet, oral, q4h PRN, Declan Prasad MD, 1 tablet at 10/23/23 1626    HYDROcodone-acetaminophen (Norco) 5-325 mg per tablet 2 tablet, 2 tablet, oral, q4h PRN, Declan Prasad MD, 2 tablet at 10/24/23 0839    iron sucrose (Venofer) injection 200 mg, 200 mg, intravenous, Daily, Brandee Villarreal, APRN-CNP    loratadine (Claritin) tablet 10 mg, 10 mg, oral, Daily, Declan Prasad MD, 10 mg at 10/24/23 0839    naloxone (Narcan) injection 0.2 mg, 0.2 mg,  intravenous, q5 min PRN, Declan Prasad MD    ondansetron ODT (Zofran-ODT) disintegrating tablet 4 mg, 4 mg, oral, q8h PRN **OR** ondansetron (Zofran) injection 4 mg, 4 mg, intravenous, q8h PRN, Declan Prasad MD    oxygen (O2) therapy, 2 L/min, inhalation, Continuous, Declan Prasad MD, 2 L/min at 10/23/23 1330    polyethylene glycol (Glycolax, Miralax) packet 17 g, 17 g, oral, Daily, Declan Prasad MD, 17 g at 10/24/23 0838    polyethylene glycol (Glycolax, Miralax) packet 17 g, 17 g, oral, Daily PRN, Sherrie Dillard, APRN-CNP    sodium chloride 0.9% infusion, 100 mL/hr, intravenous, Continuous, Declan Prasad MD, Stopped at 10/24/23 0326      Assessment/Plan     S/p left TKA  OA  Post op normocytic anemia, anemia of acute blood loss combined with post op iron deficient    Venofer 200mg IV X2  Monitor H/H

## 2023-10-24 NOTE — PROGRESS NOTES
Alexandria Rai is a 83 y.o. female on day 1 of admission presenting with S/P total knee arthroplasty, left.    Subjective   Patient seen this morning.  Family at bedside.  Doing well.  Pain well controlled.  Worked with physical therapy.       Objective     Physical Exam  Left lower extremity: Postop dressing clean dry and intact.  Neurovascular intact distally  Last Recorded Vitals  Blood pressure 106/64, pulse 101, temperature 36.5 °C (97.7 °F), temperature source Temporal, resp. rate 16, height 1.524 m (5'), weight 77 kg (169 lb 12.1 oz), SpO2 94 %.  Intake/Output last 3 Shifts:  I/O last 3 completed shifts:  In: 3170 (41.2 mL/kg) [P.O.:550; I.V.:1320 (17.1 mL/kg); IV Piggyback:1300]  Out: 1200 (15.6 mL/kg) [Urine:1200 (0.4 mL/kg/hr)]  Weight: 77 kg     Relevant Results      Scheduled medications  aspirin, 81 mg, oral, BID  docusate sodium, 100 mg, oral, BID  iron sucrose, 200 mg, intravenous, Daily  loratadine, 10 mg, oral, Daily  polyethylene glycol, 17 g, oral, Daily      Continuous medications  oxygen, 2 L/min  sodium chloride 0.9%, 100 mL/hr, Last Rate: Stopped (10/24/23 0326)      PRN medications  PRN medications: acetaminophen, HYDROcodone-acetaminophen, HYDROcodone-acetaminophen, naloxone, ondansetron ODT **OR** ondansetron, polyethylene glycol  Results for orders placed or performed during the hospital encounter of 10/23/23 (from the past 24 hour(s))   CBC   Result Value Ref Range    WBC 8.9 4.4 - 11.3 x10*3/uL    nRBC 0.0 0.0 - 0.0 /100 WBCs    RBC 3.86 (L) 4.00 - 5.20 x10*6/uL    Hemoglobin 10.5 (L) 12.0 - 16.0 g/dL    Hematocrit 33.1 (L) 36.0 - 46.0 %    MCV 86 80 - 100 fL    MCH 27.2 26.0 - 34.0 pg    MCHC 31.7 (L) 32.0 - 36.0 g/dL    RDW 14.6 (H) 11.5 - 14.5 %    Platelets 240 150 - 450 x10*3/uL    MPV 10.3 7.5 - 11.5 fL   Basic Metabolic Panel   Result Value Ref Range    Glucose 120 (H) 65 - 99 mg/dL    Sodium 134 133 - 145 mmol/L    Potassium 3.7 3.4 - 5.1 mmol/L    Chloride 102 97 - 107 mmol/L     Bicarbonate 25 24 - 31 mmol/L    Urea Nitrogen 13 8 - 25 mg/dL    Creatinine 0.70 0.40 - 1.60 mg/dL    eGFR 86 >60 mL/min/1.73m*2    Calcium 8.1 (L) 8.5 - 10.4 mg/dL    Anion Gap 7 <=19 mmol/L   Iron and TIBC   Result Value Ref Range    Iron 23 (L) 30 - 160 ug/dL    UIBC 201 110 - 370 ug/dL    TIBC 224 (L) 228 - 428 ug/dL    % Saturation 10 (L) 12 - 50 %   POCT GLUCOSE   Result Value Ref Range    POCT Glucose 110 (H) 74 - 99 mg/dL                            Assessment/Plan   Principal Problem:    S/P total knee arthroplasty, left  Active Problems:    Osteoarthritis    Postop day 1 from left total knee arthroplasty.  Plan for physical therapy for mobilization and strengthening.  DVT prophylaxis.  Pain control.  Plan for discharge to skilled nursing.         Declan Prasad MD

## 2023-10-24 NOTE — CARE PLAN
The patient's goals for the shift include  manage pain.    The clinical goals for the shift include assist with pain contol       10/24/23 at 7:40 PM - Caryn French RN

## 2023-10-24 NOTE — PROGRESS NOTES
Physical Therapy    Physical Therapy Treatment    Patient Name: Alexandria Rai  MRN: [id number]  Today's Date: 10/24/2023  Time Calculation  Start Time: 0932  Stop Time: 0956  Time Calculation (min): 24 min       Assessment/Plan   PT Assessment  PT Assessment Results: Decreased strength, Decreased range of motion, Decreased endurance, Impaired balance, Decreased mobility, Decreased safety awareness  Rehab Prognosis: Good  Medical Staff Made Aware: Yes  Barriers to Participation: Language  End of Session Communication: Bedside nurse  Assessment Comment: 84 y/o female who is s/p L TKA and presents wtih decreased L knee ROM, decreased strength, impaired mobility and decreased activity tolerance.  End of Session Patient Position: Up in chair  PT Plan  Inpatient/Swing Bed or Outpatient: Inpatient  PT Plan  Treatment/Interventions: Bed mobility, Transfer training, Gait training, Therapeutic exercise  PT Plan: Skilled PT  PT Frequency: BID  PT Discharge Recommendations: Moderate intensity level of continued care  Equipment Recommended upon Discharge: Wheeled walker  PT Recommended Transfer Status: Assist x1  PT - OK to Discharge: Yes      General Visit Information:   PT  Visit  PT Received On: 10/24/23  General  Prior to Session Communication: Bedside nurse  Patient Position Received: Up in chair  Preferred Learning Style: visual  General Comment: Pt sitting in bedside chair. Cleared per nurse to see pt for PT. Pt agreeable. Son present to translate.    Subjective   Precautions:  Precautions  LE Weight Bearing Status: Weight Bearing as Tolerated (LLE)  Medical Precautions: Fall precautions  Post-Surgical Precautions: Left total knee precautions  Vital Signs:       Objective   Pain:  Pain Assessment  Pain Assessment: 0-10  Pain Score: 3  Pain Location: Knee  Pain Orientation: Left  Cognition:     Postural Control:     Extremity/Trunk Assessments:    Activity Tolerance:     Treatments:  Therapeutic Exercise  Therapeutic  Exercise Performed: Yes  Therapeutic Exercise Activity 1: Ankle pumps/heel raises, LAQ, seated hip flexion, dima hip  adduction, resisted hip abduction x15 bilat. Seated heel slides left LE x15.    Ambulation/Gait Training  Ambulation/Gait Training Performed: Yes  Ambulation/Gait Training 1  Surface 1: Level tile  Device 1: Rolling walker  Assistance 1: Minimum assistance, Minimal verbal cues  Comments/Distance (ft) 1: Pt ambulated with RW ~45' x2 trials, min assist of 1. Verbal cues not to get too close to front of walker. Pt demonstrates slow, step to gait pattern sequencing.  Transfers  Transfer: Yes  Transfer 1  Transfer From 1: Sit to  Transfer to 1: Stand  Technique 1: Sit to stand  Transfer Device 1: Walker  Transfer Level of Assistance 1: Minimum assistance  Trials/Comments 1: Pt needed verbal cues for safe hand placement when transferring to RW.  Transfers 2  Transfer From 2: Stand to  Transfer to 2: Sit, Chair with arms  Technique 2: Stand to sit  Transfer Level of Assistance 2: Minimum assistance  Trials/Comments 2: Pt needed verbal cues for fully backing up to chair and to reach back for chair arms prior to attempting to sit.    Outcome Measures:  [facility] Basic Mobility  Turning from your back to your side while in a flat bed without using bedrails: A little  Moving from lying on your back to sitting on the side of a flat bed without using bedrails: A little  Moving to and from bed to chair (including a wheelchair): A little  Standing up from a chair using your arms (e.g. wheelchair or bedside chair): A little  To walk in hospital room: A little  Climbing 3-5 steps with railing: A lot  Basic Mobility - Total Score: 17    Education Documentation  Handouts, taught by Monse Wyatt PTA at 10/24/2023 10:06 AM.  Learner: Family, Patient  Readiness: Acceptance  Method: Explanation, Demonstration  Response: Verbalizes Understanding, Needs Reinforcement    Precautions, taught by Monse Wyatt PTA at 10/24/2023  10:06 AM.  Learner: Family, Patient  Readiness: Acceptance  Method: Explanation, Demonstration  Response: Verbalizes Understanding, Needs Reinforcement    Body Mechanics, taught by Monse Wyatt PTA at 10/24/2023 10:06 AM.  Learner: Family, Patient  Readiness: Acceptance  Method: Explanation, Demonstration  Response: Verbalizes Understanding, Needs Reinforcement    Home Exercise Program, taught by Monse Wyatt PTA at 10/24/2023 10:06 AM.  Learner: Family, Patient  Readiness: Acceptance  Method: Explanation, Demonstration  Response: Verbalizes Understanding, Needs Reinforcement    Mobility Training, taught by Monse Wyatt PTA at 10/24/2023 10:06 AM.  Learner: Family, Patient  Readiness: Acceptance  Method: Explanation, Demonstration  Response: Verbalizes Understanding, Needs Reinforcement    Education Comments  No comments found.        OP EDUCATION:  Education  Individual(s) Educated: Patient (Son)  Education Provided: Post-Op Precautions  Diagnosis and Precautions: TKA precautions, WBAT.  Patient/Caregiver Demonstrated Understanding: yes  Plan of Care Discussed and Agreed Upon: yes  Patient Response to Education: Patient/Caregiver Verbalized Understanding of Information    Encounter Problems       Encounter Problems (Active)       Balance       Standing (Progressing)       Start:  10/23/23    Expected End:  10/30/23       Pt will perform dynamic activities with supervision and no LOB            Mobility       LTG - Patient will navigate 4-6 steps with rails/device (Progressing)       Start:  10/23/23    Expected End:  10/30/23            bed mobility (Progressing)       Start:  10/23/23    Expected End:  10/30/23       Pt will perform sup to/from sit transfers with supervision         ambulation (Progressing)       Start:  10/23/23    Expected End:  10/30/23       Pt will amb > 100 ft with wheeled walker and mod independence             Transfers       sit to stand (Progressing)       Start:  10/23/23    Expected  End:  10/30/23       Pt will perform sit to stand transfer with wheeled walker and mod independence.

## 2023-10-24 NOTE — PROGRESS NOTES
Occupational Therapy    OT Treatment    Patient Name: Alexandria Rai  MRN: [id number]  Today's Date: 10/24/2023  Time Calculation  Start Time: 0759  Stop Time: 0845  Time Calculation (min): 46 min         Assessment:  OT Assessment: pt presents with fair balance, activity limited with pain, gives good effort  OT Assessment Results: Decreased ADL status, Decreased safe judgment during ADL, Decreased functional mobility  Plan:  OT Frequency: 4 times per week  OT Discharge Recommendations: Moderate intensity level of continued care       Subjective   General:  OT Received On: 10/24/23  Prior to Session Communication: Bedside nurse  Patient Position Received: Bed, 3 rail up  General Comment: pt agreeable to treatment, REZA  used throughout session  Vital Signs:     Pain:  Pain Assessment  Pain Assessment: 0-10  Pain Score: 5 - Moderate pain    Objective    Activities of Daily Living: LE Dressing  LE Dressing: Yes  LE Dressing Adaptive Equipment: Reacher, Sock aide  Sock Level of Assistance: Minimum assistance  LE Dressing Where Assessed: Chair  LE Dressing Comments: assist to place reacher into sock. Therapist instructs in effective use of AE, has phone conversation with son to provide purchase info, review knee precautions per pt request.  Functional Standing Tolerance:     Bed Mobility/Transfers: Bed Mobility 1  Bed Mobility 1: Supine to sitting  Level of Assistance 1: Close supervision  Bed Mobility Comments 1: cues for bed mobility    Transfer 1  Transfer From 1: Bed to  Transfer to 1: Chair with arms  Transfer Device 1: Walker  Transfer Level of Assistance 1: Minimum assistance  Trials/Comments 1: Pt completes functional mobility ~ 3 feet from bed>chair, cues to remain inside walker frame, step sequencing forward/backward, safe transfer techniques.    Outcome Measures:  20    Education Documentation  Precautions, taught by REYMUNDO Fischer at 10/24/2023  9:14 AM.  Learner: Family, Patient  Readiness:  Acceptance  Method: Explanation, Handout,   Response: Demonstrated Understanding, Needs Reinforcement    ADL Training, taught by REYMUNDO Fischer at 10/24/2023  9:14 AM.  Learner: Family, Patient  Readiness: Acceptance  Method: Explanation, Handout,   Response: Demonstrated Understanding, Needs Reinforcement    Education Comments  No comments found.        OP EDUCATION:  Education  Diagnosis and Precautions: Therapist educates pt and son,provides hand out for knee precautions    Goals:         Problem: OT Goals  Goal: ADLs  Description: Patient will complete ADL tasks with Close Supervision, using AE as needed, in order to increase patient's safety and independence with self-care tasks.  Outcome: Progressing  Goal: B UE Strength  Description: Patient will increase B UE strength to 4+/5 for functional transfers.  Outcome: Progressing  Goal: Functional Transfers  Description: Patient will complete functional transfers with Mod I in order to increase safety and independence with self-care tasks.  Outcome: Progressing  Goal: Precautions  Description: Patient will be able to recall precautions 100% for safety with daily tasks.  Outcome: Progressing

## 2023-10-25 PROCEDURE — 97535 SELF CARE MNGMENT TRAINING: CPT | Mod: GO

## 2023-10-25 PROCEDURE — 1100000001 HC PRIVATE ROOM DAILY

## 2023-10-25 PROCEDURE — 2500000004 HC RX 250 GENERAL PHARMACY W/ HCPCS (ALT 636 FOR OP/ED): Performed by: NURSE PRACTITIONER

## 2023-10-25 PROCEDURE — 2500000004 HC RX 250 GENERAL PHARMACY W/ HCPCS (ALT 636 FOR OP/ED): Performed by: INTERNAL MEDICINE

## 2023-10-25 PROCEDURE — 2500000004 HC RX 250 GENERAL PHARMACY W/ HCPCS (ALT 636 FOR OP/ED): Performed by: STUDENT IN AN ORGANIZED HEALTH CARE EDUCATION/TRAINING PROGRAM

## 2023-10-25 PROCEDURE — 2500000001 HC RX 250 WO HCPCS SELF ADMINISTERED DRUGS (ALT 637 FOR MEDICARE OP): Performed by: STUDENT IN AN ORGANIZED HEALTH CARE EDUCATION/TRAINING PROGRAM

## 2023-10-25 PROCEDURE — 97116 GAIT TRAINING THERAPY: CPT | Mod: GP,CQ

## 2023-10-25 PROCEDURE — 3490 HC RX 250 GENERAL PHARMACY W/ HCPCS (ALT 636 FOR OP/ED): Performed by: STUDENT IN AN ORGANIZED HEALTH CARE EDUCATION/TRAINING PROGRAM

## 2023-10-25 PROCEDURE — 97110 THERAPEUTIC EXERCISES: CPT | Mod: GP,CQ

## 2023-10-25 PROCEDURE — 2500000001 HC RX 250 WO HCPCS SELF ADMINISTERED DRUGS (ALT 637 FOR MEDICARE OP): Performed by: NURSE PRACTITIONER

## 2023-10-25 PROCEDURE — 99231 SBSQ HOSP IP/OBS SF/LOW 25: CPT | Performed by: NURSE PRACTITIONER

## 2023-10-25 RX ORDER — HYDROCODONE BITARTRATE AND ACETAMINOPHEN 5; 325 MG/1; MG/1
1 TABLET ORAL EVERY 6 HOURS PRN
Qty: 40 TABLET | Refills: 0 | Status: SHIPPED | OUTPATIENT
Start: 2023-10-25 | End: 2023-10-25 | Stop reason: SDUPTHER

## 2023-10-25 RX ORDER — ASPIRIN 81 MG/1
81 TABLET ORAL 2 TIMES DAILY
Qty: 60 TABLET | Refills: 0 | Status: SHIPPED | OUTPATIENT
Start: 2023-10-25 | End: 2023-11-24

## 2023-10-25 RX ORDER — ASPIRIN 81 MG/1
81 TABLET ORAL 2 TIMES DAILY
Qty: 60 TABLET | Refills: 0 | Status: SHIPPED | OUTPATIENT
Start: 2023-10-25 | End: 2023-10-25 | Stop reason: SDUPTHER

## 2023-10-25 RX ORDER — HYDROCODONE BITARTRATE AND ACETAMINOPHEN 5; 325 MG/1; MG/1
1 TABLET ORAL EVERY 6 HOURS PRN
Qty: 40 TABLET | Refills: 0 | Status: SHIPPED | OUTPATIENT
Start: 2023-10-25 | End: 2024-02-05 | Stop reason: WASHOUT

## 2023-10-25 RX ADMIN — LORATADINE 10 MG: 10 TABLET ORAL at 08:49

## 2023-10-25 RX ADMIN — DOCUSATE SODIUM 100 MG: 100 CAPSULE, LIQUID FILLED ORAL at 20:40

## 2023-10-25 RX ADMIN — DOCUSATE SODIUM 100 MG: 100 CAPSULE, LIQUID FILLED ORAL at 08:49

## 2023-10-25 RX ADMIN — POLYETHYLENE GLYCOL 3350 17 G: 17 POWDER, FOR SOLUTION ORAL at 08:48

## 2023-10-25 RX ADMIN — HYDROCODONE BITARTRATE AND ACETAMINOPHEN 1 TABLET: 5; 325 TABLET ORAL at 13:34

## 2023-10-25 RX ADMIN — ASPIRIN 81 MG: 81 TABLET, COATED ORAL at 08:49

## 2023-10-25 RX ADMIN — HYDROCODONE BITARTRATE AND ACETAMINOPHEN 2 TABLET: 5; 325 TABLET ORAL at 23:26

## 2023-10-25 RX ADMIN — ASPIRIN 81 MG: 81 TABLET, COATED ORAL at 20:40

## 2023-10-25 RX ADMIN — IRON SUCROSE 200 MG: 20 INJECTION, SOLUTION INTRAVENOUS at 05:15

## 2023-10-25 RX ADMIN — HYDROCODONE BITARTRATE AND ACETAMINOPHEN 2 TABLET: 5; 325 TABLET ORAL at 04:15

## 2023-10-25 RX ADMIN — SODIUM CHLORIDE 500 ML: 900 INJECTION, SOLUTION INTRAVENOUS at 00:42

## 2023-10-25 RX ADMIN — HYDROCODONE BITARTRATE AND ACETAMINOPHEN 2 TABLET: 5; 325 TABLET ORAL at 08:49

## 2023-10-25 RX ADMIN — HYDROCODONE BITARTRATE AND ACETAMINOPHEN 2 TABLET: 5; 325 TABLET ORAL at 18:27

## 2023-10-25 ASSESSMENT — COGNITIVE AND FUNCTIONAL STATUS - GENERAL
TURNING FROM BACK TO SIDE WHILE IN FLAT BAD: A LITTLE
WALKING IN HOSPITAL ROOM: A LITTLE
CLIMB 3 TO 5 STEPS WITH RAILING: A LOT
MOBILITY SCORE: 17
MOVING TO AND FROM BED TO CHAIR: A LITTLE
DRESSING REGULAR UPPER BODY CLOTHING: A LITTLE
DRESSING REGULAR LOWER BODY CLOTHING: A LITTLE
DRESSING REGULAR LOWER BODY CLOTHING: A LOT
TURNING FROM BACK TO SIDE WHILE IN FLAT BAD: A LITTLE
STANDING UP FROM CHAIR USING ARMS: A LITTLE
MOVING TO AND FROM BED TO CHAIR: A LITTLE
TOILETING: A LITTLE
DAILY ACTIVITIY SCORE: 20
MOVING TO AND FROM BED TO CHAIR: A LITTLE
DAILY ACTIVITIY SCORE: 20
MOVING FROM LYING ON BACK TO SITTING ON SIDE OF FLAT BED WITH BEDRAILS: A LITTLE
MOBILITY SCORE: 17
MOBILITY SCORE: 17
STANDING UP FROM CHAIR USING ARMS: A LITTLE
WALKING IN HOSPITAL ROOM: A LITTLE
DRESSING REGULAR UPPER BODY CLOTHING: A LITTLE
HELP NEEDED FOR BATHING: A LITTLE
MOVING FROM LYING ON BACK TO SITTING ON SIDE OF FLAT BED WITH BEDRAILS: A LITTLE
TOILETING: A LITTLE
WALKING IN HOSPITAL ROOM: A LITTLE
DAILY ACTIVITIY SCORE: 17
MOVING FROM LYING ON BACK TO SITTING ON SIDE OF FLAT BED WITH BEDRAILS: A LITTLE
HELP NEEDED FOR BATHING: A LITTLE
DRESSING REGULAR UPPER BODY CLOTHING: A LITTLE
WALKING IN HOSPITAL ROOM: A LITTLE
MOBILITY SCORE: 17
CLIMB 3 TO 5 STEPS WITH RAILING: A LOT
TURNING FROM BACK TO SIDE WHILE IN FLAT BAD: A LITTLE
DRESSING REGULAR LOWER BODY CLOTHING: A LITTLE
STANDING UP FROM CHAIR USING ARMS: A LITTLE
STANDING UP FROM CHAIR USING ARMS: A LITTLE
CLIMB 3 TO 5 STEPS WITH RAILING: A LOT
MOVING FROM LYING ON BACK TO SITTING ON SIDE OF FLAT BED WITH BEDRAILS: A LITTLE
TURNING FROM BACK TO SIDE WHILE IN FLAT BAD: A LITTLE
HELP NEEDED FOR BATHING: A LITTLE
MOVING TO AND FROM BED TO CHAIR: A LITTLE
PERSONAL GROOMING: A LITTLE
EATING MEALS: A LITTLE
CLIMB 3 TO 5 STEPS WITH RAILING: A LOT
TOILETING: A LITTLE

## 2023-10-25 ASSESSMENT — ACTIVITIES OF DAILY LIVING (ADL)
BATHING_LEVEL_OF_ASSISTANCE: MINIMUM ASSISTANCE
BATHING_WHERE_ASSESSED: OTHER (COMMENT)
HOME_MANAGEMENT_TIME_ENTRY: 25

## 2023-10-25 ASSESSMENT — PAIN SCALES - GENERAL
PAINLEVEL_OUTOF10: 4
PAINLEVEL_OUTOF10: 5 - MODERATE PAIN
PAINLEVEL_OUTOF10: 7
PAINLEVEL_OUTOF10: 3
PAINLEVEL_OUTOF10: 2
PAINLEVEL_OUTOF10: 5 - MODERATE PAIN
PAINLEVEL_OUTOF10: 3
PAINLEVEL_OUTOF10: 2
PAINLEVEL_OUTOF10: 7

## 2023-10-25 ASSESSMENT — PAIN SCALES - WONG BAKER
WONGBAKER_NUMERICALRESPONSE: HURTS WHOLE LOT
WONGBAKER_NUMERICALRESPONSE: HURTS WHOLE LOT
WONGBAKER_NUMERICALRESPONSE: HURTS LITTLE MORE

## 2023-10-25 ASSESSMENT — PAIN - FUNCTIONAL ASSESSMENT
PAIN_FUNCTIONAL_ASSESSMENT: WONG-BAKER FACES
PAIN_FUNCTIONAL_ASSESSMENT: 0-10
PAIN_FUNCTIONAL_ASSESSMENT: FLACC (FACE, LEGS, ACTIVITY, CRY, CONSOLABILITY)
PAIN_FUNCTIONAL_ASSESSMENT: WONG-BAKER FACES
PAIN_FUNCTIONAL_ASSESSMENT: 0-10
PAIN_FUNCTIONAL_ASSESSMENT: WONG-BAKER FACES

## 2023-10-25 NOTE — CARE PLAN
The patient's goals for the shift include  manage pain.     The clinical goals for the shift include manage pain control, monitor VS, work with PT/OT, and increase OOB to chair for meals.     Over the shift, the patient did not make progress toward the following goals. Barriers to progression include patient's pain and activity tolerance. Recommendations to address these barriers include pt work with PT/OT, encourage mobility, and     Problem: Pain  Goal: My pain/discomfort is manageable  Outcome: Progressing     Problem: Safety  Goal: Patient will be injury free during hospitalization  Outcome: Progressing     Problem: Daily Care  Goal: Daily care needs are met  Outcome: Progressing     Problem: Psychosocial Needs  Goal: Collaborate with me, my family, and caregiver to identify my specific goals  Outcome: Progressing     Problem: Skin  Goal: Promote/optimize nutrition  Outcome: Progressing     Problem: Skin  Goal: Promote skin healing  Outcome: Progressing     Problem: Deep Vein Thrombosis  Goal: I will remain free from complications of deep vein thrombosis and maintain current level of mobility  Outcome: Progressing     Problem: Chronic Conditions and Co-morbidities  Goal: Patient's chronic conditions and co-morbidity symptoms are monitored and maintained or improved  Outcome: Progressing   interventions to assist with pain management.

## 2023-10-25 NOTE — PROGRESS NOTES
Alexandria Rai is a 83 y.o. female on day 2 of admission presenting with S/P total knee arthroplasty, left.  Patient seen in room with her son present. Patient sitting up in chair eating her lunch. Per son patient will be her until Friday due to resp issues. Awaiting precert.   Plan for discharge to [facility] skilled nursing.    PLAN: Discharge to [facility], awaiting precert

## 2023-10-25 NOTE — PROGRESS NOTES
Occupational Therapy    Occupational Therapy Treatment    Name: Alexandria Rai  MRN: [id number]  : 1940  Date: 10/25/23  Time Calculation  Start Time: 822  Stop Time: 847  Time Calculation (min): 25 min    Assessment:  OT Assessment: patient is presenting with a decline in strength, balance, and activity tolerance, resulting in an increased need for assistance with ADL tasks. skilled OT to address the above deficits and increase patient's safety and independence with self-care tasks.  Prognosis: Good  Barriers to Discharge: Other (Comment) (language barrier)  Evaluation/Treatment Tolerance: Patient tolerated treatment well  End of Session Communication: Bedside nurse  End of Session Patient Position: Bed, 3 rail up (daughter in law present in room)  Plan:  Treatment Interventions: ADL retraining, Functional transfer training, Patient/family training, Neuromuscular reeducation, Compensatory technique education, Endurance training  OT Frequency: 4 times per week  OT Discharge Recommendations: Moderate intensity level of continued care    Subjective   General:  OT Last Visit  OT Received On: 10/25/23  General  Reason for Referral: recent surgery  Referred By: Dr. Prasad  Past Medical History Relevant to Rehab: 84 y/o female who presents s/p L TKA with Dr. Prasad on 10/23/23. PMH includes arthritis, HLD, HTN. Spanish is pt's primary spoken language.  Family/Caregiver Present: Yes  Caregiver Feedback: daughter in law at bedside to assist with translation  Co-Treatment: OT  Co-Treatment Reason: safe mobility  Prior to Session Communication: Bedside nurse  Patient Position Received: Up in chair  Preferred Learning Style: verbal, visual  General Comment: Patient in the chair upon arrival. patient's primary language is Bengali, however, Martti not used this date, as patient is private with ADL routine. patient able to follow with visual demonstration of tasks  Vitals:  Vital Signs  Heart Rate: (!) 132 (during  functional task participation. RN aware)  Pain Assessment:  Pain Assessment  Pain Assessment: FLACC (Face, Legs, Activity, Cry, Consolability)  Pain Score: 2  Pain Type: Surgical pain  Pain Location: Knee  Pain Orientation: Left  Pain Descriptors: Burning  Pain Interventions: Cold applied, Repositioned     Objective   Activities of Daily Living: Grooming  Grooming Level of Assistance: Setup  Grooming Where Assessed: Chair  Grooming Comments: wash face and brush teeth    UE Bathing  UE Bathing Level of Assistance: Setup  UE Bathing Where Assessed: Other (Comment) (seated in the chair)  UE Bathing Comments: sponge bath, s/u to wash UB. patient is very modest, therapist steps behind curtain and patient completes UB bathing with s/u    LE Bathing  LE Bathing Level of Assistance: Minimum assistance  LE Bathing Where Assessed: Other (Comment) (seated in the chair)  LE Bathing Comments: assistance to wash L foot    UE Dressing  UE Dressing Level of Assistance: Setup  UE Dressing Where Assessed: Chair  UE Dressing Comments: doff/don gown    LE Dressing  Sock Level of Assistance: Minimum assistance  LE Dressing Where Assessed: Chair  LE Dressing Comments: to don L sock over her toes, patient then able to pull over her foot    Toileting  Toileting Level of Assistance: Contact guard  Where Assessed: Other (Comment) (standing at walker)  Toileting Comments: to wash elisha area in stance    Functional Standing Tolerance:  Functional Standing Tolerance  Time: 2 minutes  Activity: during ADL participation  Bed Mobility/Transfers: Transfers  Transfer: Yes  Transfer 1  Transfer From 1: Chair with arms to  Transfer to 1: Stand  Technique 1: Sit to stand  Transfer Device 1: Walker  Transfer Level of Assistance 1: Minimum assistance  Trials/Comments 1: x2 trials. side steps with Mod A towards the head of bed    Therapy/Activity: Therapeutic Activity  Therapeutic Activity Performed: Yes  Therapeutic Activity 1: seated in the chair,  patient completes full ADL routine. she completes sit to stand txfer from the chair to 2WW with Min A in order to wash her elisha area. once in standing, CGA during hygiene tasks.    Strength:  Strength  Strength Comments: B UE at least >/ 3+/5 grossly    Outcome Measures:  [facility] Daily Activity  Putting on and taking off regular lower body clothing: A little  Bathing (including washing, rinsing, drying): A little  Putting on and taking off regular upper body clothing: A little  Toileting, which includes using toilet, bedpan or urinal: A little  Taking care of personal grooming such as brushing teeth: None  Eating Meals: None  Daily Activity - Total Score: 20        Education Documentation  Body Mechanics, taught by Estelle Crow OT at 10/25/2023  9:39 AM.  Learner: Patient  Readiness: Acceptance  Method: Explanation, Demonstration  Response: Needs Reinforcement, Demonstrated Understanding    Precautions, taught by Estelle Crow OT at 10/25/2023  9:39 AM.  Learner: Patient  Readiness: Acceptance  Method: Explanation, Demonstration  Response: Needs Reinforcement, Demonstrated Understanding    ADL Training, taught by Estelle Crow OT at 10/25/2023  9:39 AM.  Learner: Patient  Readiness: Acceptance  Method: Explanation, Demonstration  Response: Needs Reinforcement, Demonstrated Understanding    Education Comments  No comments found.      Goals:  Encounter Problems          OT Goals       ADLs (Progressing)       Start:  10/23/23    Expected End:  11/06/23       Patient will complete ADL tasks with Close Supervision, using AE as needed, in order to increase patient's safety and independence with self-care tasks.         B UE Strength (Progressing)       Start:  10/23/23    Expected End:  11/06/23       Patient will increase B UE strength to 4+/5 for functional transfers.         Functional Transfers (Progressing)       Start:  10/23/23    Expected End:  11/06/23       Patient will complete functional transfers with  Mod I in order to increase safety and independence with self-care tasks.         Precautions (Progressing)       Start:  10/23/23    Expected End:  11/06/23       Patient will be able to recall precautions 100% for safety with daily tasks.

## 2023-10-25 NOTE — NURSING NOTE
End of shift order review completed. Patient OOB sitting in chair eating dinner with family at bedside. Call light within reach. Will continue plan of care.

## 2023-10-25 NOTE — NURSING NOTE
Assumed care of patient. Patient resting in bed, breathing regular and unlabored. Patient's HR on continuous pulse ox. 93 and Spo2 94% on 2L via nasal cannula. Call light within reach. Will continue plan of care.

## 2023-10-25 NOTE — PROGRESS NOTES
Physical Therapy    Physical Therapy Treatment    Patient Name: Alexandria Rai  MRN: [id number]  Today's Date: 10/25/2023  Time Calculation  Start Time: 1257  Stop Time: 1328  Time Calculation (min): 31 min       Assessment/Plan   PT Assessment  PT Assessment Results: Decreased strength, Decreased range of motion, Decreased endurance, Impaired balance, Decreased mobility, Decreased safety awareness  Rehab Prognosis: Good  Medical Staff Made Aware: Yes  Barriers to Participation: Language  End of Session Communication: Bedside nurse (Nurse made aware of pt's request for pain meds.)  Assessment Comment: 82 y/o female who is s/p L TKA and presents wtih decreased L knee ROM, decreased strength, impaired mobility and decreased activity tolerance.  End of Session Patient Position: Up in chair  PT Plan  Inpatient/Swing Bed or Outpatient: Inpatient  PT Plan  Treatment/Interventions: Transfer training, Gait training, Therapeutic exercise  PT Plan: Skilled PT  PT Frequency: BID  PT Discharge Recommendations: Moderate intensity level of continued care  Equipment Recommended upon Discharge: Wheeled walker  PT Recommended Transfer Status: Assist x1  PT - OK to Discharge: Yes      General Visit Information:   PT  Visit  PT Received On: 10/25/23  General  Prior to Session Communication: Bedside nurse  Patient Position Received: Up in chair  Preferred Learning Style: verbal, visual  General Comment: Pt agreeable to PT. Son present to translate.    Subjective   Precautions:  Precautions  LE Weight Bearing Status: Weight Bearing as Tolerated (LLE)  Medical Precautions: Fall precautions  Post-Surgical Precautions: Left total knee precautions  Vital Signs:       Objective   Pain:  Pain Assessment  Pain Assessment: 0-10  Pain Score: 5 - Moderate pain  Pain Location: Knee  Pain Orientation: Left  Pain Interventions: Cold pack  Cognition:     Postural Control:     Extremity/Trunk Assessments:    Activity Tolerance:      Treatments:  Therapeutic Exercise  Therapeutic Exercise Performed: Yes  Therapeutic Exercise Activity 1: Ankle pumps/heel raises, LAQ, seated hip flexion, dima hip  adduction, resisted hip abduction x15 bilat. Seated heel slides left LE x15. Visual cues used to perform correctly.    Ambulation/Gait Training  Ambulation/Gait Training Performed: Yes  Ambulation/Gait Training 1  Surface 1: Level tile  Device 1: Rolling walker  Assistance 1: Minimum assistance, Minimal verbal cues  Comments/Distance (ft) 1: Pt ambulated with RW ~50' x2 min assist of 1. Pt starting to demonstrate reciprocal gait, verbal cues not to pus walker too far ahead. Noted mild instability left knee but no significant buckling.  Transfers  Transfer: Yes  Transfer 1  Transfer From 1: Sit to  Transfer to 1: Stand  Technique 1: Sit to stand  Transfer Device 1: Walker  Transfer Level of Assistance 1: Minimum assistance, Minimal verbal cues  Trials/Comments 1: Verbal cues for safe hand placement transferring to RW.  Transfers 2  Transfer From 2: Stand to  Transfer to 2: Sit, Chair with arms  Technique 2: Stand to sit  Transfer Level of Assistance 2: Minimum assistance, Minimal verbal cues  Trials/Comments 2: Verbal cues for fully backing up to chair, pulling walker with her and to reach back for chair arms prior to attempting to sit.    Outcome Measures:  [facility] Basic Mobility  Turning from your back to your side while in a flat bed without using bedrails: A little  Moving from lying on your back to sitting on the side of a flat bed without using bedrails: A little  Moving to and from bed to chair (including a wheelchair): A little  Standing up from a chair using your arms (e.g. wheelchair or bedside chair): A little  To walk in hospital room: A little  Climbing 3-5 steps with railing: A lot  Basic Mobility - Total Score: 17    Education Documentation  Handouts, taught by Monse Wyatt PTA at 10/25/2023  1:35 PM.  Learner: Patient  Readiness:  Acceptance  Method: Explanation, Demonstration  Response: Verbalizes Understanding    Precautions, taught by Monse Wyatt PTA at 10/25/2023  1:35 PM.  Learner: Patient  Readiness: Acceptance  Method: Explanation, Demonstration  Response: Verbalizes Understanding    Body Mechanics, taught by Monse Wyatt PTA at 10/25/2023  1:35 PM.  Learner: Patient  Readiness: Acceptance  Method: Explanation, Demonstration  Response: Verbalizes Understanding    Home Exercise Program, taught by Monse Wyatt PTA at 10/25/2023  1:35 PM.  Learner: Patient  Readiness: Acceptance  Method: Explanation, Demonstration  Response: Verbalizes Understanding    Mobility Training, taught by Monse Wyatt PTA at 10/25/2023  1:35 PM.  Learner: Patient  Readiness: Acceptance  Method: Explanation, Demonstration  Response: Verbalizes Understanding    Handouts, taught by Monse Wyatt PTA at 10/25/2023 10:15 AM.  Learner: Patient  Readiness: Acceptance  Method: Demonstration  Response: Verbalizes Understanding    Precautions, taught by Monse Wyatt PTA at 10/25/2023 10:15 AM.  Learner: Patient  Readiness: Acceptance  Method: Demonstration  Response: Verbalizes Understanding    Body Mechanics, taught by Monse Wyatt PTA at 10/25/2023 10:15 AM.  Learner: Patient  Readiness: Acceptance  Method: Demonstration  Response: Verbalizes Understanding    Home Exercise Program, taught by Monse Wyatt PTA at 10/25/2023 10:15 AM.  Learner: Patient  Readiness: Acceptance  Method: Demonstration  Response: Verbalizes Understanding    Mobility Training, taught by Monse Wyatt PTA at 10/25/2023 10:15 AM.  Learner: Patient  Readiness: Acceptance  Method: Demonstration  Response: Verbalizes Understanding    Handouts, taught by Monse Wyatt PTA at 10/24/2023  3:17 PM.  Learner: Patient  Readiness: Acceptance  Method: Explanation, Demonstration  Response: Verbalizes Understanding, Needs Reinforcement    Precautions, taught by Monse Wyatt PTA at 10/24/2023  3:17  PM.  Learner: Patient  Readiness: Acceptance  Method: Explanation, Demonstration  Response: Verbalizes Understanding, Needs Reinforcement    Body Mechanics, taught by Monse Wyatt PTA at 10/24/2023  3:17 PM.  Learner: Patient  Readiness: Acceptance  Method: Explanation, Demonstration  Response: Verbalizes Understanding, Needs Reinforcement    Home Exercise Program, taught by Monse Wyatt PTA at 10/24/2023  3:17 PM.  Learner: Patient  Readiness: Acceptance  Method: Explanation, Demonstration  Response: Verbalizes Understanding, Needs Reinforcement    Mobility Training, taught by Monse Wyatt PTA at 10/24/2023  3:17 PM.  Learner: Patient  Readiness: Acceptance  Method: Explanation, Demonstration  Response: Verbalizes Understanding, Needs Reinforcement    Education Comments  No comments found.        OP EDUCATION:  Education  Individual(s) Educated: Patient  Education Provided: Post-Op Precautions  Diagnosis and Precautions: TKA precautions, WBAT.  Patient/Caregiver Demonstrated Understanding: yes  Plan of Care Discussed and Agreed Upon: yes  Patient Response to Education: Patient/Caregiver Verbalized Understanding of Information    Encounter Problems       Encounter Problems (Active)       Balance       Standing (Progressing)       Start:  10/23/23    Expected End:  10/30/23       Pt will perform dynamic activities with supervision and no LOB            Mobility       LTG - Patient will navigate 4-6 steps with rails/device (Progressing)       Start:  10/23/23    Expected End:  10/30/23            bed mobility (Progressing)       Start:  10/23/23    Expected End:  10/30/23       Pt will perform sup to/from sit transfers with supervision         ambulation (Progressing)       Start:  10/23/23    Expected End:  10/30/23       Pt will amb > 100 ft with wheeled walker and mod independence             Transfers       sit to stand (Progressing)       Start:  10/23/23    Expected End:  10/30/23       Pt will perform sit to  stand transfer with wheeled walker and mod independence.

## 2023-10-25 NOTE — NURSING NOTE
Marilu VIGIL NP aware patient has been tachycardia (-120) on continuous pulse Ox. No new orders received at this time. Will continue plan of care.

## 2023-10-25 NOTE — PROGRESS NOTES
Physical Therapy    Physical Therapy Treatment    Patient Name: Alexandria Rai  MRN: [id number]  Today's Date: 10/25/2023  Time Calculation  Start Time: 0933  Stop Time: 1000  Time Calculation (min): 27 min       Assessment/Plan   PT Assessment  PT Assessment Results: Decreased strength, Decreased range of motion, Decreased endurance, Impaired balance, Decreased mobility, Decreased safety awareness  Rehab Prognosis: Good  Medical Staff Made Aware: Yes  Barriers to Participation: Language  End of Session Communication: Bedside nurse  Assessment Comment: 84 y/o female who is s/p L TKA and presents wtih decreased L knee ROM, decreased strength, impaired mobility and decreased activity tolerance.  End of Session Patient Position: Bed, 3 rail up  PT Plan  Inpatient/Swing Bed or Outpatient: Inpatient  PT Plan  Treatment/Interventions: Bed mobility, Transfer training, Gait training, Therapeutic exercise  PT Plan: Skilled PT  PT Frequency: BID  PT Discharge Recommendations: Moderate intensity level of continued care  Equipment Recommended upon Discharge: Wheeled walker  PT Recommended Transfer Status: Assist x1  PT - OK to Discharge: Yes      General Visit Information:   PT  Visit  PT Received On: 10/25/23  General  Prior to Session Communication: Bedside nurse  Patient Position Received: Bed, 3 rail up  Preferred Learning Style: verbal, visual  General Comment: Pt agreeable to PT.    Subjective   Precautions:  Precautions  LE Weight Bearing Status: Weight Bearing as Tolerated (LLE)  Medical Precautions: Fall precautions  Post-Surgical Precautions: Left total knee precautions  Vital Signs:       Objective   Pain:  Pain Assessment  Pain Assessment: 0-10  Pain Score: 3  Pain Location: Knee  Pain Orientation: Left  Pain Interventions: Cold pack  Cognition:     Postural Control:     Extremity/Trunk Assessments:    Activity Tolerance:     Treatments:  Therapeutic Exercise  Therapeutic Exercise Performed: Yes  Therapeutic Exercise  Activity 1: Ankle pumps/heel raises, LAQ, seated hip flexion, dima hip  adduction, resisted hip abduction x15 bilat. Seated heel slides left LE x15. Visual cues used to perform correctly.    Bed Mobility  Bed Mobility: Yes  Bed Mobility 1  Bed Mobility 1: Supine to sitting  Level of Assistance 1: Minimum assistance  Bed Mobility Comments 1: Pt needed min assist with left LE off bed supine to sitting.  Bed Mobility 2  Bed Mobility  2: Sitting to supine  Level of Assistance 2: Minimum assistance  Bed Mobility Comments 2: Pt needed min assist with left LE onto bed sit to supine.    Ambulation/Gait Training  Ambulation/Gait Training Performed: Yes  Ambulation/Gait Training 1  Surface 1: Level tile  Device 1: Rolling walker  Assistance 1: Minimum assistance, Minimal verbal cues  Comments/Distance (ft) 1: Pt ambulated with RW ~40' x2 min assist of 1. Pt demonstrates slow, step to gait pattern. Mod antalgic gait. Noted mild SOB, SpO2 to 88% with quick recovery to 94% once sitting. HR elevating to 130's during ambulation trial. Nurse aware.  Transfers  Transfer: Yes  Transfer 1  Transfer From 1: Sit to  Transfer to 1: Stand  Technique 1: Sit to stand  Transfer Device 1: Walker  Transfer Level of Assistance 1: Minimum assistance, Minimal verbal cues  Trials/Comments 1: Verbal/visual cues for safe hand placement  Transfers 2  Transfer From 2: Stand to  Transfer to 2: Sit, Chair with arms  Technique 2: Stand to sit  Transfer Level of Assistance 2: Minimum assistance  Trials/Comments 2: Verbal cues to reach back for chair arms.    Outcome Measures:  [facility] Basic Mobility  Turning from your back to your side while in a flat bed without using bedrails: A little  Moving from lying on your back to sitting on the side of a flat bed without using bedrails: A little  Moving to and from bed to chair (including a wheelchair): A little  Standing up from a chair using your arms (e.g. wheelchair or bedside chair): A little  To walk in  hospital room: A little  Climbing 3-5 steps with railing: A lot  Basic Mobility - Total Score: 17    Education Documentation  Handouts, taught by Monse Wyatt PTA at 10/25/2023 10:15 AM.  Learner: Patient  Readiness: Acceptance  Method: Demonstration  Response: Verbalizes Understanding    Precautions, taught by Monse Wyatt PTA at 10/25/2023 10:15 AM.  Learner: Patient  Readiness: Acceptance  Method: Demonstration  Response: Verbalizes Understanding    Body Mechanics, taught by Monse Wyatt PTA at 10/25/2023 10:15 AM.  Learner: Patient  Readiness: Acceptance  Method: Demonstration  Response: Verbalizes Understanding    Home Exercise Program, taught by Monse Wyatt PTA at 10/25/2023 10:15 AM.  Learner: Patient  Readiness: Acceptance  Method: Demonstration  Response: Verbalizes Understanding    Mobility Training, taught by Monse Wyatt PTA at 10/25/2023 10:15 AM.  Learner: Patient  Readiness: Acceptance  Method: Demonstration  Response: Verbalizes Understanding    Handouts, taught by Monse Wyatt PTA at 10/24/2023  3:17 PM.  Learner: Patient  Readiness: Acceptance  Method: Explanation, Demonstration  Response: Verbalizes Understanding, Needs Reinforcement    Precautions, taught by Monse Wyatt PTA at 10/24/2023  3:17 PM.  Learner: Patient  Readiness: Acceptance  Method: Explanation, Demonstration  Response: Verbalizes Understanding, Needs Reinforcement    Body Mechanics, taught by Monse Wyatt PTA at 10/24/2023  3:17 PM.  Learner: Patient  Readiness: Acceptance  Method: Explanation, Demonstration  Response: Verbalizes Understanding, Needs Reinforcement    Home Exercise Program, taught by Monse Wyatt PTA at 10/24/2023  3:17 PM.  Learner: Patient  Readiness: Acceptance  Method: Explanation, Demonstration  Response: Verbalizes Understanding, Needs Reinforcement    Mobility Training, taught by Monse Wyatt PTA at 10/24/2023  3:17 PM.  Learner: Patient  Readiness: Acceptance  Method: Explanation,  Demonstration  Response: Verbalizes Understanding, Needs Reinforcement    Education Comments  No comments found.        OP EDUCATION:  Education  Individual(s) Educated: Patient  Education Provided: Post-Op Precautions  Diagnosis and Precautions: TKA precautions, WBAT.  Patient/Caregiver Demonstrated Understanding: yes  Plan of Care Discussed and Agreed Upon: yes  Patient Response to Education: Patient/Caregiver Verbalized Understanding of Information    Encounter Problems       Encounter Problems (Active)       Balance       Standing (Progressing)       Start:  10/23/23    Expected End:  10/30/23       Pt will perform dynamic activities with supervision and no LOB            Mobility       LTG - Patient will navigate 4-6 steps with rails/device (Progressing)       Start:  10/23/23    Expected End:  10/30/23            bed mobility (Progressing)       Start:  10/23/23    Expected End:  10/30/23       Pt will perform sup to/from sit transfers with supervision         ambulation (Progressing)       Start:  10/23/23    Expected End:  10/30/23       Pt will amb > 100 ft with wheeled walker and mod independence             Transfers       sit to stand (Progressing)       Start:  10/23/23    Expected End:  10/30/23       Pt will perform sit to stand transfer with wheeled walker and mod independence.

## 2023-10-25 NOTE — NURSING NOTE
Agree with previous assessment. Patient awake resting in bed watching TV. Call light within reach. Voices no needs or concerns at this time. Son at bedside. Will continue plan of care.

## 2023-10-25 NOTE — NURSING NOTE
Spoke with Dr. Altman because he was the oncall doctor, told him about the elevated heart rate of 128 and O2 dropping to 87-88 on RA. He said to put 2L NC on her, monitor heart rate and consult the hospitalist.      10/25/23 at 12:24 AM - Caryn French RN

## 2023-10-25 NOTE — CARE PLAN
The patient's goals for the shift include  pain management and ambulation.    The clinical goals for the shift include manage pain control, monitor VS, work with PT/OT, and increase OOB to chair for meals      10/25/23 at 7:37 PM - Caryn French RN

## 2023-10-25 NOTE — PROGRESS NOTES
Alexandria Rai is a 83 y.o. female on day 2 of admission presenting with S/P total knee arthroplasty, left.     Subjective   Primary language Maltese, son to interpret, pt reports ambulating better today, having less knee pain, she is tachy rate 116bpm, RRR, denies chest pain, palpitation, sob, carter, cough, congestion, on room air at rest pulse ox 92-96%.  Denies headache, vertigo, fever, chills, abd pain, tenderness, nausea, bloating, she is passing flatus, no bm, denies hematuria, dysuria, peripheral paresthesia, +edema lower ext, denies incision drainage, erythema, rashes,   From home with son, anticipate discharge rehab facility pending insurance precert         Objective     Physical Exam  Constitutional:       Appearance: Normal appearance. She is normal weight.   HENT:      Head: Normocephalic and atraumatic.      Right Ear: External ear normal.      Left Ear: External ear normal.      Nose: Nose normal.      Mouth/Throat:      Mouth: Mucous membranes are moist.   Eyes:      Extraocular Movements: Extraocular movements intact.      Conjunctiva/sclera: Conjunctivae normal.      Pupils: Pupils are equal, round, and reactive to light.   Cardiovascular:      Rate and Rhythm: Tachycardia present.      Heart sounds: Normal heart sounds.   Pulmonary:      Effort: Pulmonary effort is normal. No respiratory distress.      Breath sounds: Normal breath sounds. No wheezing, rhonchi or rales.   Chest:      Chest wall: No tenderness.   Abdominal:      General: Abdomen is flat. Bowel sounds are normal. There is no distension.      Palpations: Abdomen is soft.      Tenderness: There is no abdominal tenderness.   Musculoskeletal:         General: Swelling and tenderness present.      Cervical back: Normal range of motion and neck supple.      Right lower leg: Edema present.      Left lower leg: Edema present.      Comments: Post op trace right knee and bilat lower ext edema, right knee tenderness   Skin:     General: Skin is  warm and dry.      Capillary Refill: Capillary refill takes 2 to 3 seconds.      Coloration: Skin is not pale.      Findings: No bruising, erythema, lesion or rash.   Neurological:      General: No focal deficit present.      Mental Status: She is alert and oriented to person, place, and time. Mental status is at baseline.   Psychiatric:         Mood and Affect: Mood normal.         Behavior: Behavior normal.         Last Recorded Vitals  Blood pressure 115/62, pulse (!) 132, temperature 36.3 °C (97.3 °F), temperature source Temporal, resp. rate 16, height 1.524 m (5'), weight 77 kg (169 lb 12.1 oz), SpO2 96 %.  Intake/Output last 3 Shifts:  I/O last 3 completed shifts:  In: 2375 (30.8 mL/kg) [P.O.:1100; I.V.:665 (8.6 mL/kg); IV Piggyback:610]  Out: 2900 (37.7 mL/kg) [Urine:2900 (1 mL/kg/hr)]  Weight: 77 kg     Relevant Results                             Assessment/Plan   Principal Problem:    S/P total knee arthroplasty, left  Active Problems:    Osteoarthritis    Post op normocytic anemia, anemia of acute blood loss combined with post op iron deficient     Venofer 200mg IV X2  Monitor H/H        Brandee Villarreal, APRN-CNP

## 2023-10-25 NOTE — PROGRESS NOTES
Alexandria Rai is a 83 y.o. female on day 2 of admission presenting with S/P total knee arthroplasty, left.    Subjective   Patient seen this AM.  Family at bedside.  Doing well.  Pain well controlled.  Continuing to work with physical therapy.       Objective     Physical Exam  Left lower extremity: Postop dressing clean dry and intact.  Neurovascular intact distally  Last Recorded Vitals  Blood pressure 115/62, pulse (!) 132, temperature 36.3 °C (97.3 °F), temperature source Temporal, resp. rate 16, height 1.524 m (5'), weight 77 kg (169 lb 12.1 oz), SpO2 96 %.  Intake/Output last 3 Shifts:  I/O last 3 completed shifts:  In: 2375 (30.8 mL/kg) [P.O.:1100; I.V.:665 (8.6 mL/kg); IV Piggyback:610]  Out: 2900 (37.7 mL/kg) [Urine:2900 (1 mL/kg/hr)]  Weight: 77 kg     Relevant Results      Scheduled medications  aspirin, 81 mg, oral, BID  docusate sodium, 100 mg, oral, BID  loratadine, 10 mg, oral, Daily  polyethylene glycol, 17 g, oral, Daily      Continuous medications  oxygen, 2 L/min      PRN medications  PRN medications: acetaminophen, HYDROcodone-acetaminophen, HYDROcodone-acetaminophen, naloxone, ondansetron ODT **OR** ondansetron, polyethylene glycol  Results for orders placed or performed during the hospital encounter of 10/23/23 (from the past 24 hour(s))   POCT GLUCOSE   Result Value Ref Range    POCT Glucose 168 (H) 74 - 99 mg/dL   POCT GLUCOSE   Result Value Ref Range    POCT Glucose 134 (H) 74 - 99 mg/dL                            Assessment/Plan   Principal Problem:    S/P total knee arthroplasty, left  Active Problems:    Osteoarthritis    Postop day 2 from left total knee arthroplasty.  Plan for physical therapy for mobilization and strengthening.  DVT prophylaxis.  Pain control.  Plan for discharge to skilled nursing.         Declan Prasad MD

## 2023-10-26 VITALS
BODY MASS INDEX: 33.33 KG/M2 | HEART RATE: 108 BPM | TEMPERATURE: 97.5 F | WEIGHT: 169.75 LBS | DIASTOLIC BLOOD PRESSURE: 81 MMHG | HEIGHT: 60 IN | RESPIRATION RATE: 17 BRPM | SYSTOLIC BLOOD PRESSURE: 120 MMHG | OXYGEN SATURATION: 99 %

## 2023-10-26 PROCEDURE — 3490 HC RX 250 GENERAL PHARMACY W/ HCPCS (ALT 636 FOR OP/ED): Performed by: STUDENT IN AN ORGANIZED HEALTH CARE EDUCATION/TRAINING PROGRAM

## 2023-10-26 PROCEDURE — 2500000001 HC RX 250 WO HCPCS SELF ADMINISTERED DRUGS (ALT 637 FOR MEDICARE OP): Performed by: STUDENT IN AN ORGANIZED HEALTH CARE EDUCATION/TRAINING PROGRAM

## 2023-10-26 PROCEDURE — 97535 SELF CARE MNGMENT TRAINING: CPT | Mod: GO

## 2023-10-26 PROCEDURE — 2500000004 HC RX 250 GENERAL PHARMACY W/ HCPCS (ALT 636 FOR OP/ED): Performed by: STUDENT IN AN ORGANIZED HEALTH CARE EDUCATION/TRAINING PROGRAM

## 2023-10-26 PROCEDURE — 2500000001 HC RX 250 WO HCPCS SELF ADMINISTERED DRUGS (ALT 637 FOR MEDICARE OP): Performed by: NURSE PRACTITIONER

## 2023-10-26 PROCEDURE — 97116 GAIT TRAINING THERAPY: CPT | Mod: GP,CQ

## 2023-10-26 PROCEDURE — 97110 THERAPEUTIC EXERCISES: CPT | Mod: GP,CQ

## 2023-10-26 PROCEDURE — 99231 SBSQ HOSP IP/OBS SF/LOW 25: CPT | Performed by: NURSE PRACTITIONER

## 2023-10-26 RX ORDER — ALUMINUM HYDROXIDE, MAGNESIUM HYDROXIDE, AND SIMETHICONE 1200; 120; 1200 MG/30ML; MG/30ML; MG/30ML
20 SUSPENSION ORAL 4 TIMES DAILY PRN
Status: DISCONTINUED | OUTPATIENT
Start: 2023-10-26 | End: 2023-10-26 | Stop reason: HOSPADM

## 2023-10-26 RX ADMIN — ASPIRIN 81 MG: 81 TABLET, COATED ORAL at 09:30

## 2023-10-26 RX ADMIN — HYDROCODONE BITARTRATE AND ACETAMINOPHEN 2 TABLET: 5; 325 TABLET ORAL at 09:31

## 2023-10-26 RX ADMIN — LORATADINE 10 MG: 10 TABLET ORAL at 09:30

## 2023-10-26 RX ADMIN — POLYETHYLENE GLYCOL 3350 17 G: 17 POWDER, FOR SOLUTION ORAL at 09:31

## 2023-10-26 RX ADMIN — HYDROCODONE BITARTRATE AND ACETAMINOPHEN 2 TABLET: 5; 325 TABLET ORAL at 15:09

## 2023-10-26 RX ADMIN — DOCUSATE SODIUM 100 MG: 100 CAPSULE, LIQUID FILLED ORAL at 09:30

## 2023-10-26 RX ADMIN — HYDROCODONE BITARTRATE AND ACETAMINOPHEN 2 TABLET: 5; 325 TABLET ORAL at 05:41

## 2023-10-26 ASSESSMENT — PAIN - FUNCTIONAL ASSESSMENT
PAIN_FUNCTIONAL_ASSESSMENT: WONG-BAKER FACES
PAIN_FUNCTIONAL_ASSESSMENT: 0-10
PAIN_FUNCTIONAL_ASSESSMENT: 0-10
PAIN_FUNCTIONAL_ASSESSMENT: WONG-BAKER FACES
PAIN_FUNCTIONAL_ASSESSMENT: 0-10
PAIN_FUNCTIONAL_ASSESSMENT: 0-10
PAIN_FUNCTIONAL_ASSESSMENT: WONG-BAKER FACES
PAIN_FUNCTIONAL_ASSESSMENT: 0-10
PAIN_FUNCTIONAL_ASSESSMENT: 0-10
PAIN_FUNCTIONAL_ASSESSMENT: FLACC (FACE, LEGS, ACTIVITY, CRY, CONSOLABILITY)

## 2023-10-26 ASSESSMENT — COGNITIVE AND FUNCTIONAL STATUS - GENERAL
CLIMB 3 TO 5 STEPS WITH RAILING: A LOT
PERSONAL GROOMING: A LITTLE
STANDING UP FROM CHAIR USING ARMS: A LITTLE
MOVING TO AND FROM BED TO CHAIR: A LITTLE
TOILETING: A LITTLE
MOBILITY SCORE: 16
TURNING FROM BACK TO SIDE WHILE IN FLAT BAD: A LITTLE
DRESSING REGULAR LOWER BODY CLOTHING: A LITTLE
DRESSING REGULAR UPPER BODY CLOTHING: A LITTLE
STANDING UP FROM CHAIR USING ARMS: A LITTLE
TOILETING: A LITTLE
CLIMB 3 TO 5 STEPS WITH RAILING: TOTAL
MOVING TO AND FROM BED TO CHAIR: A LITTLE
MOBILITY SCORE: 17
MOBILITY SCORE: 18
MOVING FROM LYING ON BACK TO SITTING ON SIDE OF FLAT BED WITH BEDRAILS: A LITTLE
MOBILITY SCORE: 17
MOVING TO AND FROM BED TO CHAIR: A LITTLE
WALKING IN HOSPITAL ROOM: A LITTLE
TURNING FROM BACK TO SIDE WHILE IN FLAT BAD: A LITTLE
MOVING FROM LYING ON BACK TO SITTING ON SIDE OF FLAT BED WITH BEDRAILS: A LITTLE
MOVING FROM LYING ON BACK TO SITTING ON SIDE OF FLAT BED WITH BEDRAILS: A LITTLE
DRESSING REGULAR LOWER BODY CLOTHING: A LOT
DRESSING REGULAR UPPER BODY CLOTHING: A LITTLE
HELP NEEDED FOR BATHING: A LITTLE
HELP NEEDED FOR BATHING: A LITTLE
DAILY ACTIVITIY SCORE: 19
CLIMB 3 TO 5 STEPS WITH RAILING: A LOT
DAILY ACTIVITIY SCORE: 19
HELP NEEDED FOR BATHING: A LITTLE
TOILETING: A LITTLE
MOVING FROM LYING ON BACK TO SITTING ON SIDE OF FLAT BED WITH BEDRAILS: A LITTLE
TURNING FROM BACK TO SIDE WHILE IN FLAT BAD: A LITTLE
WALKING IN HOSPITAL ROOM: A LITTLE
STANDING UP FROM CHAIR USING ARMS: A LITTLE
TURNING FROM BACK TO SIDE WHILE IN FLAT BAD: A LITTLE
DAILY ACTIVITIY SCORE: 19
WALKING IN HOSPITAL ROOM: A LITTLE
DRESSING REGULAR LOWER BODY CLOTHING: A LOT
CLIMB 3 TO 5 STEPS WITH RAILING: A LITTLE
MOVING TO AND FROM BED TO CHAIR: A LITTLE
WALKING IN HOSPITAL ROOM: A LITTLE
DRESSING REGULAR UPPER BODY CLOTHING: A LITTLE
STANDING UP FROM CHAIR USING ARMS: A LITTLE

## 2023-10-26 ASSESSMENT — PAIN SCALES - GENERAL
PAINLEVEL_OUTOF10: 8
PAINLEVEL_OUTOF10: 4
PAINLEVEL_OUTOF10: 2
PAINLEVEL_OUTOF10: 0 - NO PAIN
PAINLEVEL_OUTOF10: 3
PAINLEVEL_OUTOF10: 8
PAINLEVEL_OUTOF10: 3
PAINLEVEL_OUTOF10: 4

## 2023-10-26 ASSESSMENT — ACTIVITIES OF DAILY LIVING (ADL)
BATHING_LEVEL_OF_ASSISTANCE: MINIMUM ASSISTANCE
HOME_MANAGEMENT_TIME_ENTRY: 24
BATHING_WHERE_ASSESSED: OTHER (COMMENT)

## 2023-10-26 ASSESSMENT — PAIN SCALES - WONG BAKER
WONGBAKER_NUMERICALRESPONSE: HURTS LITTLE BIT
WONGBAKER_NUMERICALRESPONSE: HURTS LITTLE BIT
WONGBAKER_NUMERICALRESPONSE: HURTS WHOLE LOT

## 2023-10-26 NOTE — PROGRESS NOTES
Alexandria Rai is a 83 y.o. female on day 3 of admission presenting with S/P total knee arthroplasty, left. Post op iron deficient, pt tachy rate 116bpm, RRR,     Subjective   Pt sitting In chair, reports post op pain controlled, rates pain 3/10, denies chest pain, sob, cough, c/o constipation, no bm since Sunday, she is taking colace bid, and miralax daily, passing flatus, tolerates diet, no  nausea or emesis,      Objective     Physical Exam  Constitutional:       Appearance: Normal appearance.   HENT:      Head: Normocephalic and atraumatic.      Right Ear: External ear normal.      Left Ear: External ear normal.      Nose: Nose normal.      Mouth/Throat:      Mouth: Mucous membranes are moist.   Eyes:      Extraocular Movements: Extraocular movements intact.      Pupils: Pupils are equal, round, and reactive to light.   Cardiovascular:      Rate and Rhythm: Regular rhythm. Tachycardia present.      Heart sounds: Normal heart sounds.   Pulmonary:      Effort: No respiratory distress.      Breath sounds: No wheezing, rhonchi or rales.   Chest:      Chest wall: No tenderness.   Abdominal:      General: Abdomen is flat. Bowel sounds are normal. There is no distension.      Palpations: Abdomen is soft.      Tenderness: There is no abdominal tenderness.   Musculoskeletal:         General: Swelling and tenderness present.      Cervical back: Normal range of motion and neck supple.      Right lower leg: Edema present.      Left lower leg: Edema present.      Comments: Trace bilat lower ext edema, edema left knee and tenderness, dressing to left knee dry/intact     Skin:     Capillary Refill: Capillary refill takes 2 to 3 seconds.      Coloration: Skin is not pale.      Findings: No bruising, erythema, lesion or rash.   Neurological:      General: No focal deficit present.      Mental Status: She is alert and oriented to person, place, and time. Mental status is at baseline.   Psychiatric:         Mood and Affect: Mood  normal.         Behavior: Behavior normal.         Last Recorded Vitals  Blood pressure 114/58, pulse 100, temperature 36.2 °C (97.2 °F), temperature source Temporal, resp. rate 16, height 1.524 m (5'), weight 77 kg (169 lb 12.1 oz), SpO2 95 %.  Intake/Output last 3 Shifts:  I/O last 3 completed shifts:  In: 2410 (31.3 mL/kg) [P.O.:1900; IV Piggyback:510]  Out: 3100 (40.3 mL/kg) [Urine:3100 (1.1 mL/kg/hr)]  Weight: 77 kg     Relevant Results                             Assessment/Plan   Principal Problem:    S/P total knee arthroplasty, left  Active Problems:    Osteoarthritis    Post op normocytic anemia, anemia of acute blood loss combined with anemia of post op iron deficient     Venofer 200mg IV X3  Monitor H/H           Brandee Villarreal, APRN-CNP

## 2023-10-26 NOTE — PROGRESS NOTES
Physical Therapy    Physical Therapy Treatment    Patient Name: Alexandria Rai  MRN: [id number]  Today's Date: 10/26/2023  Time Calculation  Start Time: 1428  Stop Time: 1501  Time Calculation (min): 33 min       Assessment/Plan   PT Assessment  PT Assessment Results: Decreased strength, Decreased range of motion, Decreased endurance, Impaired balance, Decreased mobility, Decreased safety awareness  Rehab Prognosis: Good  Medical Staff Made Aware: Yes  Barriers to Participation: Language  End of Session Communication: Bedside nurse  Assessment Comment: Mobility improving with each visit. Pt was able to tolerate increased ambulation distances. Has tendency to push walker too far ahead and noted still mild bucking left knee with fatigue.  End of Session Patient Position: Bed, 3 rail up  PT Plan  Inpatient/Swing Bed or Outpatient: Inpatient  PT Plan  Treatment/Interventions: Bed mobility, Transfer training, Gait training, Therapeutic exercise  PT Plan: Skilled PT  PT Frequency: BID  PT Discharge Recommendations: Moderate intensity level of continued care  Equipment Recommended upon Discharge: Wheeled walker  PT Recommended Transfer Status: Assist x1  PT - OK to Discharge: Yes      General Visit Information:   PT  Visit  PT Received On: 10/26/23  General  Family/Caregiver Present: Yes  Prior to Session Communication: Bedside nurse  Patient Position Received: Bed, 3 rail up  Preferred Learning Style: visual  General Comment: Pt agreeable to PT. Son present to translate as needed.    Subjective   Precautions:  Precautions  LE Weight Bearing Status: Weight Bearing as Tolerated (LLE)  Medical Precautions: Fall precautions  Post-Surgical Precautions: Left total knee precautions  Vital Signs:       Objective   Pain:  Pain Assessment  Pain Assessment: 0-10  Pain Score: 3  Pain Type: Surgical pain  Pain Location: Knee  Pain Orientation: Left  Pain Interventions: Cold pack  Cognition:     Postural Control:     Extremity/Trunk  Assessments:      Activity Tolerance:     Treatments:  Therapeutic Exercise  Therapeutic Exercise Performed: Yes  Therapeutic Exercise Activity 1: Ankle pumps/heel raises, LAQ, seated hip flexion, dima hip  adduction, resisted hip abduction x15 bilat. Seated heel slides left LE x15. Visual cues used to perform correctly.    Bed Mobility  Bed Mobility: Yes  Bed Mobility 1  Bed Mobility 1: Supine to sitting  Level of Assistance 1: Minimum assistance  Bed Mobility Comments 1: Pt required min assist with left LE off bed supine  to sit.  Bed Mobility 2  Bed Mobility  2: Sitting to supine  Level of Assistance 2: Minimum assistance  Bed Mobility Comments 2: Pt required min assist with bilat LE's onto bed sit to supine.    Ambulation/Gait Training  Ambulation/Gait Training Performed: Yes  Ambulation/Gait Training 1  Surface 1: Level tile  Device 1: Rolling walker  Assistance 1: Minimum assistance, Minimal verbal cues  Comments/Distance (ft) 1: Pt ambulated with RW ~80' x2 min assist. Pt demonstrates slow, reciprocal gait. Verbal cues not to push walker too far ahead.  Transfers  Transfer: Yes  Transfer 1  Transfer From 1: Sit to  Transfer to 1: Stand  Technique 1: Sit to stand  Transfer Device 1: Walker  Transfer Level of Assistance 1: Minimum assistance, Minimal verbal cues  Trials/Comments 1: Verbal cues for safe hand placement transferring to RW.  Transfers 2  Transfer From 2: Stand to  Transfer to 2: Stand, Chair with arms  Technique 2: Stand to sit  Transfer Level of Assistance 2: Contact guard  Trials/Comments 2: Verbal cues for fully backing up to chair, pulling walker with her and to reach back for chair arms prior to attempting to sit.    Outcome Measures:  [facility] Basic Mobility  Turning from your back to your side while in a flat bed without using bedrails: A little  Moving from lying on your back to sitting on the side of a flat bed without using bedrails: A little  Moving to and from bed to chair (including a  wheelchair): A little  Standing up from a chair using your arms (e.g. wheelchair or bedside chair): A little  To walk in hospital room: A little  Climbing 3-5 steps with railing: A lot  Basic Mobility - Total Score: 17    Education Documentation  Handouts, taught by Monse Wyatt PTA at 10/26/2023  3:10 PM.  Learner: Patient  Readiness: Acceptance  Method: Explanation  Response: Verbalizes Understanding    Precautions, taught by Monse Wyatt PTA at 10/26/2023  3:10 PM.  Learner: Patient  Readiness: Acceptance  Method: Explanation  Response: Verbalizes Understanding    Body Mechanics, taught by Monse Wyatt PTA at 10/26/2023  3:10 PM.  Learner: Patient  Readiness: Acceptance  Method: Explanation  Response: Verbalizes Understanding    Home Exercise Program, taught by Monse Wyatt PTA at 10/26/2023  3:10 PM.  Learner: Patient  Readiness: Acceptance  Method: Explanation  Response: Verbalizes Understanding    Mobility Training, taught by Monse Wyatt PTA at 10/26/2023  3:10 PM.  Learner: Patient  Readiness: Acceptance  Method: Explanation  Response: Verbalizes Understanding    Handouts, taught by Monse Wyatt PTA at 10/26/2023 10:50 AM.  Learner: Patient  Readiness: Acceptance  Method: Explanation, Demonstration  Response: Demonstrated Understanding    Precautions, taught by Monse Wyatt PTA at 10/26/2023 10:50 AM.  Learner: Patient  Readiness: Acceptance  Method: Explanation, Demonstration  Response: Demonstrated Understanding    Body Mechanics, taught by Monse Wyatt PTA at 10/26/2023 10:50 AM.  Learner: Patient  Readiness: Acceptance  Method: Explanation, Demonstration  Response: Demonstrated Understanding    Home Exercise Program, taught by Monse Wyatt PTA at 10/26/2023 10:50 AM.  Learner: Patient  Readiness: Acceptance  Method: Explanation, Demonstration  Response: Demonstrated Understanding    Mobility Training, taught by Monse Wyatt PTA at 10/26/2023 10:50 AM.  Learner: Patient  Readiness:  Acceptance  Method: Explanation, Demonstration  Response: Demonstrated Understanding    Education Comments  No comments found.        OP EDUCATION:  Education  Individual(s) Educated: Patient  Education Provided: Post-Op Precautions  Diagnosis and Precautions: TKA precautions, WBAT.  Patient/Caregiver Demonstrated Understanding: yes  Plan of Care Discussed and Agreed Upon: yes  Patient Response to Education: Patient/Caregiver Verbalized Understanding of Information    Encounter Problems       Encounter Problems (Active)       Balance       Standing (Progressing)       Start:  10/23/23    Expected End:  10/30/23       Pt will perform dynamic activities with supervision and no LOB            Mobility       LTG - Patient will navigate 4-6 steps with rails/device (Progressing)       Start:  10/23/23    Expected End:  10/30/23            bed mobility (Progressing)       Start:  10/23/23    Expected End:  10/30/23       Pt will perform sup to/from sit transfers with supervision         ambulation (Progressing)       Start:  10/23/23    Expected End:  10/30/23       Pt will amb > 100 ft with wheeled walker and mod independence             Transfers       sit to stand (Progressing)       Start:  10/23/23    Expected End:  10/30/23       Pt will perform sit to stand transfer with wheeled walker and mod independence.

## 2023-10-26 NOTE — PROGRESS NOTES
Alexandria Rai is a 83 y.o. female on day 3 of admission presenting with S/P total knee arthroplasty, left.    Subjective   Patient seen this afternoon family at bedside.  Doing well.  Pain well controlled.  Worked well with physical therapy today.       Objective     Physical Exam  Left lower extremity: Postop dressing clean dry and intact.  Neurovascular intact distally  Last Recorded Vitals  Blood pressure 114/58, pulse 100, temperature 36.2 °C (97.2 °F), temperature source Temporal, resp. rate 16, height 1.524 m (5'), weight 77 kg (169 lb 12.1 oz), SpO2 95 %.  Intake/Output last 3 Shifts:  I/O last 3 completed shifts:  In: 2410 (31.3 mL/kg) [P.O.:1900; IV Piggyback:510]  Out: 3100 (40.3 mL/kg) [Urine:3100 (1.1 mL/kg/hr)]  Weight: 77 kg     Relevant Results      Scheduled medications  aspirin, 81 mg, oral, BID  docusate sodium, 100 mg, oral, BID  iron sucrose, 200 mg, intravenous, Once  loratadine, 10 mg, oral, Daily  polyethylene glycol, 17 g, oral, Daily      Continuous medications  oxygen, 2 L/min      PRN medications  PRN medications: acetaminophen, alum-mag hydroxide-simeth, HYDROcodone-acetaminophen, HYDROcodone-acetaminophen, naloxone, ondansetron ODT **OR** ondansetron, polyethylene glycol  No results found for this or any previous visit (from the past 24 hour(s)).                           Assessment/Plan   Principal Problem:    S/P total knee arthroplasty, left  Active Problems:    Osteoarthritis    Postop day 3 from left total knee arthroplasty.  Plan for physical therapy for mobilization and strengthening.  DVT prophylaxis.  Pain control.  Plan for discharge to skilled nursing.         Declan Prasad MD

## 2023-10-26 NOTE — PROGRESS NOTES
Patient approved and precert in for [facility]. Family and patient was informed of discharge. Ambulance transportation arranged via ambulance through round-trip and scheduled for 1730 to [facility].     Mila Mendoza RN

## 2023-10-26 NOTE — PROGRESS NOTES
Occupational Therapy    Occupational Therapy Treatment    Name: Alexandria Rai  MRN: [id number]  : 1940  Date: 10/26/23  Time Calculation  Start Time: 0736  Stop Time: 0800  Time Calculation (min): 24 min    Assessment:  OT Assessment: patient is presenting with a decline in strength, balance, and activity tolerance, resulting in an increased need for assistance with ADL tasks. skilled OT to address the above deficits and increase patient's safety and independence with self-care tasks.  Prognosis: Good  Barriers to Discharge: Other (Comment) (language barrier)  Evaluation/Treatment Tolerance: Patient tolerated treatment well  Medical Staff Made Aware: Yes  End of Session Communication: Bedside nurse  End of Session Patient Position: Up in chair  Plan:  Treatment Interventions: ADL retraining, Functional transfer training, Endurance training, Patient/family training, Compensatory technique education  OT Frequency: 4 times per week  OT Discharge Recommendations: Moderate intensity level of continued care    Subjective   General:  OT Last Visit  OT Received On: 10/26/23  General  Reason for Referral: recent surgery  Referred By: Dr. Prasad  Past Medical History Relevant to Rehab: 84 y/o female who presents s/p L TKA with Dr. Prasad on 10/23/23. PMH includes arthritis, HLD, HTN. Yakut is pt's primary spoken language.  Prior to Session Communication: Bedside nurse  Patient Position Received: Bed, 3 rail up  Preferred Learning Style: visual  General Comment: Patient in bed upon arrival and agreeable to participate. Patient able to understand visual cues and is able to express needs throughout session    Pain Assessment:  Pain Assessment  Pain Assessment: FLACC (Face, Legs, Activity, Cry, Consolability)  Pain Score: 2  Pain Type: Surgical pain  Pain Location: Knee  Pain Orientation: Left  Pain Descriptors: Burning  Pain Interventions: Cold applied, Repositioned     Objective   Activities of Daily Living:     Grooming  Grooming Level of Assistance: Setup  Grooming Where Assessed: Chair  Grooming Comments: wash face and brush teeth    UE Bathing  UE Bathing Level of Assistance: Setup  UE Bathing Where Assessed: Other (Comment) (seated in the chair)  UE Bathing Comments: sponge bath, s/u to wash UB. patient is very modest, therapist steps behind curtain and patient completes UB bathing with s/u    LE Bathing  LE Bathing Level of Assistance: Minimum assistance  LE Bathing Where Assessed: Other (Comment) (seated in the chair)  LE Bathing Comments: assistance to wash L foot    UE Dressing  UE Dressing Level of Assistance: Setup  UE Dressing Where Assessed: Chair  UE Dressing Comments: doff/don gown    LE Dressing  LE Dressing Adaptive Equipment: Reacher, Sock aide  Sock Level of Assistance: Minimum assistance  LE Dressing Where Assessed: Chair  LE Dressing Comments: patient doffs sock with s/u with use of reacher. she requires visual cues to don socks with use of sock aide    Toileting  Toileting Level of Assistance: Contact guard  Where Assessed: Toilet (standing at walker)  Toileting Comments: to wash elisha area in stance    Functional Standing Tolerance:  Functional Standing Tolerance  Time: 3 minutes  Activity: standing at sink to wash hands and elisha area  Bed Mobility/Transfers: Bed Mobility  Bed Mobility: Yes  Bed Mobility 1  Bed Mobility 1: Supine to sitting  Level of Assistance 1: Minimum assistance  Bed Mobility Comments 1: assist for L LE off the bed with supine to sitting, head of bed significantly elevated  Bed Mobility 2  Bed Mobility  2: Sitting to supine  Level of Assistance 2: Minimum assistance  Bed Mobility Comments 2: assist for L LE    Transfers  Transfer: Yes  Transfer 1  Transfer From 1: Bed to  Transfer to 1: Stand  Technique 1: Sit to stand  Transfer Device 1: Walker  Transfer Level of Assistance 1: Minimum assistance, Minimal verbal cues  Trials/Comments 1: cues for proper hand placement  Transfers  2  Transfer From 2: Chair with arms to  Transfer to 2: Stand  Technique 2: Sit to stand  Transfer Device 2: Walker  Transfer Level of Assistance 2: Minimum assistance, Minimal verbal cues  Trials/Comments 2: cues for proper hand placement    Toilet Transfers  Toilet Transfer From: Walker  Toilet Transfer Type: To and from  Toilet Transfer to: Standard toilet  Toilet Transfer Technique: Ambulating  Toilet Transfers: Contact guard  Toilet Transfers Comments: sit to stand to and from toilet/walker with CGA and use of grab bars    Therapy/Activity: Therapeutic Activity  Therapeutic Activity Performed: Yes  Therapeutic Activity 1: patient txfers from supine to EOB with Min A for her L LE. patient is seated EOB. she ocmpletes sit to stand txfer from EOB to 2WW with Min A and cues for proper hand placement. patient is seated in the chair. she completes full ADL routine. patient completes sit to stand txfer from the chair with Min A. patient completes functional mobility to and from the bathroom with CGA. she completes toilet txfer with CGA and use of grab bars. patient stands at the sink to wash her hands with CGA and Fair balance. patient is seated in the chair and remains with all needs within reach    Strength:  Strength  Strength Comments: B UE at least >/ 3+/5 grossly    Outcome Measures:  [facility] Daily Activity  Putting on and taking off regular lower body clothing: A little  Bathing (including washing, rinsing, drying): A little  Putting on and taking off regular upper body clothing: A little  Toileting, which includes using toilet, bedpan or urinal: A little  Taking care of personal grooming such as brushing teeth: A little  Eating Meals: None  Daily Activity - Total Score: 19        Education Documentation  Body Mechanics, taught by Estelle Crow OT at 10/26/2023  9:26 AM.  Learner: Patient  Readiness: Acceptance  Method: Demonstration  Response: Needs Reinforcement    Precautions, taught by Estelle Crow OT at  10/26/2023  9:26 AM.  Learner: Patient  Readiness: Acceptance  Method: Demonstration  Response: Needs Reinforcement    ADL Training, taught by Estelle Crow OT at 10/26/2023  9:26 AM.  Learner: Patient  Readiness: Acceptance  Method: Demonstration  Response: Needs Reinforcement    Body Mechanics, taught by Estelle Crow OT at 10/25/2023  9:39 AM.  Learner: Patient  Readiness: Acceptance  Method: Explanation, Demonstration  Response: Needs Reinforcement, Demonstrated Understanding    Precautions, taught by Estelle Crow OT at 10/25/2023  9:39 AM.  Learner: Patient  Readiness: Acceptance  Method: Explanation, Demonstration  Response: Needs Reinforcement, Demonstrated Understanding    ADL Training, taught by Estelle Crow OT at 10/25/2023  9:39 AM.  Learner: Patient  Readiness: Acceptance  Method: Explanation, Demonstration  Response: Needs Reinforcement, Demonstrated Understanding    Education Comments  No comments found.      Goals:  Encounter Problems       Encounter Problems (Active)       OT Goals       ADLs (Progressing)       Start:  10/23/23    Expected End:  11/06/23       Patient will complete ADL tasks with Close Supervision, using AE as needed, in order to increase patient's safety and independence with self-care tasks.         B UE Strength (Progressing)       Start:  10/23/23    Expected End:  11/06/23       Patient will increase B UE strength to 4+/5 for functional transfers.         Functional Transfers (Progressing)       Start:  10/23/23    Expected End:  11/06/23       Patient will complete functional transfers with Mod I in order to increase safety and independence with self-care tasks.         Precautions (Progressing)       Start:  10/23/23    Expected End:  11/06/23       Patient will be able to recall precautions 100% for safety with daily tasks.

## 2023-10-26 NOTE — DISCHARGE SUMMARY
Discharge Diagnosis  S/P total knee arthroplasty, left    Issues Requiring Follow-Up  Physical therapy status post left total knee arthroplasty    Test Results Pending At Discharge  Pending Labs       No current pending labs.            Hospital Course  83-year-old female status post left total knee arthroplasty.  Worked with physical therapy.  Pain was well controlled with oral pain meds.  Plan for discharge to SNF for rehabilitation.    Pertinent Physical Exam At Time of Discharge  Physical Exam    Home Medications     Medication List      START taking these medications     aspirin 81 mg EC tablet; Take 1 tablet (81 mg) by mouth 2 times a day.   HYDROcodone-acetaminophen 5-325 mg tablet; Commonly known as: Norco;   Take 1 tablet by mouth every 6 hours if needed for moderate pain (4 - 6).     CONTINUE taking these medications     ergocalciferol 1.25 MG (36723 UT) capsule; Commonly known as: Vitamin   D-2; Take 1 capsule (50,000 Units) by mouth 1 (one) time per week.   furosemide 20 mg tablet; Commonly known as: Lasix   loratadine 10 mg tablet; Commonly known as: Claritin   meloxicam 7.5 mg tablet; Commonly known as: Mobic   mupirocin 2 % ointment; Commonly known as: Bactroban; Apply topically 3   times a day for 10 days. apply to affected area     STOP taking these medications     chlorhexidine 0.12 % solution; Commonly known as: Peridex       Outpatient Follow-Up  No future appointments.    Declan Prasad MD

## 2023-10-26 NOTE — PROGRESS NOTES
Physical Therapy    Physical Therapy Treatment    Patient Name: Alexandria Rai  MRN: [id number]  Today's Date: 10/26/2023  Time Calculation  Start Time: 0814  Stop Time: 0840  Time Calculation (min): 26 min       Assessment/Plan   PT Assessment  PT Assessment Results: Decreased strength, Decreased range of motion, Decreased endurance, Impaired balance, Decreased mobility, Decreased safety awareness  Rehab Prognosis: Good  Medical Staff Made Aware: Yes  Barriers to Participation: Language  End of Session Communication: Bedside nurse  Assessment Comment: 84 y/o female who is s/p L TKA and presents wtih decreased L knee ROM, decreased strength, impaired mobility and decreased activity tolerance.  End of Session Patient Position: Up in chair  PT Plan  Inpatient/Swing Bed or Outpatient: Inpatient  PT Plan  Treatment/Interventions: Transfer training, Gait training, Therapeutic exercise  PT Plan: Skilled PT  PT Frequency: BID  PT Discharge Recommendations: Moderate intensity level of continued care  Equipment Recommended upon Discharge: Wheeled walker  PT Recommended Transfer Status: Assist x1  PT - OK to Discharge: Yes      General Visit Information:   PT  Visit  PT Received On: 10/26/23  General  Prior to Session Communication: Bedside nurse  Patient Position Received: Up in chair  Preferred Learning Style: visual  General Comment: Pt agreeable to PT.    Subjective   Precautions:  Precautions  LE Weight Bearing Status: Weight Bearing as Tolerated (LLE)  Medical Precautions: Fall precautions  Post-Surgical Precautions: Left total knee precautions  Vital Signs:       Objective   Pain:  Pain Assessment  Pain Assessment: 0-10  Pain Score: 4  Pain Type: Surgical pain  Pain Location: Knee  Pain Orientation: Left  Pain Interventions: Cold pack  Cognition:     Postural Control:     Extremity/Trunk Assessments:    Activity Tolerance:     Treatments:  Therapeutic Exercise  Therapeutic Exercise Performed: Yes  Therapeutic Exercise  Activity 1: Ankle pumps/heel raises, LAQ, seated hip flexion, dima hip  adduction, resisted hip abduction x15 bilat. Seated heel slides left LE x15. Visual cues used to perform correctly.    Ambulation/Gait Training  Ambulation/Gait Training Performed: Yes  Ambulation/Gait Training 1  Surface 1: Level tile  Device 1: Rolling walker  Assistance 1: Minimum assistance, Minimal verbal cues  Comments/Distance (ft) 1: Pt ambulated with RW ~45' x2 min assist of 1. Pt able to ambulate with reciprocal gait pattern, slow jazmin. Noted mild buckling towards end second ambulation trial, pt compensating with bilat UE support on walker.  Transfers  Transfer: Yes  Transfer 1  Transfer From 1: Sit to  Transfer to 1: Stand  Technique 1: Sit to stand  Transfer Device 1: Walker  Transfer Level of Assistance 1: Minimum assistance, Minimal verbal cues  Trials/Comments 1: Verbal cues for safe hand placement transferring to RW.  Transfers 2  Transfer From 2: Stand to  Transfer to 2: Stand, Chair with arms  Technique 2: Stand to sit  Transfer Level of Assistance 2: Contact guard  Trials/Comments 2: Verbal cues for fully backing up to chair, pulling walker with her and to reach back for chair arms prior to attempting to sit.    Outcome Measures:  [facility] Basic Mobility  Turning from your back to your side while in a flat bed without using bedrails: A little  Moving from lying on your back to sitting on the side of a flat bed without using bedrails: A little  Moving to and from bed to chair (including a wheelchair): A little  Standing up from a chair using your arms (e.g. wheelchair or bedside chair): A little  To walk in hospital room: A little  Climbing 3-5 steps with railing: A lot  Basic Mobility - Total Score: 17    Education Documentation  Handouts, taught by Monse Wyatt PTA at 10/26/2023 10:50 AM.  Learner: Patient  Readiness: Acceptance  Method: Explanation, Demonstration  Response: Demonstrated Understanding    Precautions, taught  by Monse Wyatt PTA at 10/26/2023 10:50 AM.  Learner: Patient  Readiness: Acceptance  Method: Explanation, Demonstration  Response: Demonstrated Understanding    Body Mechanics, taught by oMnse Wyatt PTA at 10/26/2023 10:50 AM.  Learner: Patient  Readiness: Acceptance  Method: Explanation, Demonstration  Response: Demonstrated Understanding    Home Exercise Program, taught by Monse Wyatt PTA at 10/26/2023 10:50 AM.  Learner: Patient  Readiness: Acceptance  Method: Explanation, Demonstration  Response: Demonstrated Understanding    Mobility Training, taught by Monse Wyatt PTA at 10/26/2023 10:50 AM.  Learner: Patient  Readiness: Acceptance  Method: Explanation, Demonstration  Response: Demonstrated Understanding    Handouts, taught by Monse Wyatt PTA at 10/25/2023  1:35 PM.  Learner: Patient  Readiness: Acceptance  Method: Explanation, Demonstration  Response: Verbalizes Understanding    Precautions, taught by Monse Wyatt PTA at 10/25/2023  1:35 PM.  Learner: Patient  Readiness: Acceptance  Method: Explanation, Demonstration  Response: Verbalizes Understanding    Body Mechanics, taught by Monse Wyatt PTA at 10/25/2023  1:35 PM.  Learner: Patient  Readiness: Acceptance  Method: Explanation, Demonstration  Response: Verbalizes Understanding    Home Exercise Program, taught by Monse Wyatt PTA at 10/25/2023  1:35 PM.  Learner: Patient  Readiness: Acceptance  Method: Explanation, Demonstration  Response: Verbalizes Understanding    Mobility Training, taught by Monse Wyatt PTA at 10/25/2023  1:35 PM.  Learner: Patient  Readiness: Acceptance  Method: Explanation, Demonstration  Response: Verbalizes Understanding    Education Comments  No comments found.        OP EDUCATION:  Education  Individual(s) Educated: Patient  Education Provided: Post-Op Precautions  Diagnosis and Precautions: TKA precautions, WBAT.  Patient/Caregiver Demonstrated Understanding: yes  Plan of Care Discussed and Agreed Upon:  yes  Patient Response to Education: Patient/Caregiver Verbalized Understanding of Information    Encounter Problems       Encounter Problems (Active)       Balance       Standing (Progressing)       Start:  10/23/23    Expected End:  10/30/23       Pt will perform dynamic activities with supervision and no LOB            Mobility       LTG - Patient will navigate 4-6 steps with rails/device (Progressing)       Start:  10/23/23    Expected End:  10/30/23            bed mobility (Progressing)       Start:  10/23/23    Expected End:  10/30/23       Pt will perform sup to/from sit transfers with supervision         ambulation (Progressing)       Start:  10/23/23    Expected End:  10/30/23       Pt will amb > 100 ft with wheeled walker and mod independence             Transfers       sit to stand (Progressing)       Start:  10/23/23    Expected End:  10/30/23       Pt will perform sit to stand transfer with wheeled walker and mod independence.

## 2023-10-26 NOTE — CARE PLAN
The patient's goals for the shift include      The clinical goals for the shift include pain management, ambulation      Problem: Pain  Goal: My pain/discomfort is manageable  Outcome: Progressing     Problem: Safety  Goal: Patient will be injury free during hospitalization  Outcome: Progressing  Goal: I will remain free of falls  Outcome: Progressing     Problem: Daily Care  Goal: Daily care needs are met  Outcome: Progressing     Problem: Psychosocial Needs  Goal: Collaborate with me, my family, and caregiver to identify my specific goals  Outcome: Progressing     Problem: Skin  Goal: Promote/optimize nutrition  Outcome: Progressing  Goal: Promote skin healing  Outcome: Progressing     Problem: Deep Vein Thrombosis  Goal: I will remain free from complications of deep vein thrombosis and maintain current level of mobility  Outcome: Progressing

## 2023-11-02 ENCOUNTER — NURSING HOME VISIT (OUTPATIENT)
Dept: POST ACUTE CARE | Facility: EXTERNAL LOCATION | Age: 83
End: 2023-11-02
Payer: COMMERCIAL

## 2023-11-02 VITALS
BODY MASS INDEX: 31.91 KG/M2 | TEMPERATURE: 97.7 F | DIASTOLIC BLOOD PRESSURE: 85 MMHG | SYSTOLIC BLOOD PRESSURE: 153 MMHG | WEIGHT: 163.4 LBS | HEART RATE: 104 BPM | RESPIRATION RATE: 16 BRPM | OXYGEN SATURATION: 92 %

## 2023-11-02 DIAGNOSIS — I10 PRIMARY HYPERTENSION: ICD-10-CM

## 2023-11-02 DIAGNOSIS — Z96.652 S/P TOTAL KNEE ARTHROPLASTY, LEFT: ICD-10-CM

## 2023-11-02 DIAGNOSIS — M54.30 SCIATICA, UNSPECIFIED LATERALITY: ICD-10-CM

## 2023-11-02 DIAGNOSIS — M17.0 PRIMARY OSTEOARTHRITIS OF BOTH KNEES: Primary | ICD-10-CM

## 2023-11-02 DIAGNOSIS — K59.03 DRUG-INDUCED CONSTIPATION: ICD-10-CM

## 2023-11-02 PROBLEM — I73.9 PVD (PERIPHERAL VASCULAR DISEASE) (CMS-HCC): Status: ACTIVE | Noted: 2023-11-02

## 2023-11-02 PROCEDURE — 99309 SBSQ NF CARE MODERATE MDM 30: CPT | Performed by: PHYSICIAN ASSISTANT

## 2023-11-02 ASSESSMENT — ENCOUNTER SYMPTOMS
ABDOMINAL PAIN: 0
NAUSEA: 0
CONSTIPATION: 1
DIARRHEA: 0
CHILLS: 0
COUGH: 0
VOMITING: 0
WHEEZING: 0
FEVER: 0
SHORTNESS OF BREATH: 0
NERVOUS/ANXIOUS: 0

## 2023-11-02 NOTE — PROGRESS NOTES
No chief complaint on file.      Subjective   [id number] : Alexandria Rai is a 83 y.o. female admitted to [facility] for rehab.     HPI  Pt with h/o osteoarthritis of the knee underwent an elective left TKA with Dr Prasad on 10/23.   Pt seen while resting in bed.  She is alert, pleasant and interactive.  She does not appear to be in any pain.  She is taking some norco for pain about 1-2 x/day.   She was seen by cardiologist at facility due to HTN and tachycardia.  Started on cardizem which is to start today.   She has no signficant lower leg edema.  No new issues or concerns per nursing.     Review of Systems   Constitutional:  Negative for chills and fever.   Respiratory:  Negative for cough, shortness of breath and wheezing.    Cardiovascular:  Negative for leg swelling.   Gastrointestinal:  Positive for constipation. Negative for abdominal pain, diarrhea, nausea and vomiting.   Psychiatric/Behavioral:  The patient is not nervous/anxious.    All other systems reviewed and are negative.      Objective   /85   Pulse 104   Temp 36.5 °C (97.7 °F)   Resp 16   Wt 74.1 kg (163 lb 6.4 oz)   SpO2 92%   BMI 31.91 kg/m²    Physical Exam  Constitutional:       General: She is not in acute distress.  Eyes:      Conjunctiva/sclera: Conjunctivae normal.      Pupils: Pupils are equal, round, and reactive to light.   Cardiovascular:      Rate and Rhythm: Normal rate and regular rhythm.      Heart sounds: No murmur heard.  Pulmonary:      Effort: Pulmonary effort is normal.      Breath sounds: No wheezing, rhonchi or rales.   Abdominal:      General: Abdomen is flat. Bowel sounds are normal. There is no distension.      Palpations: Abdomen is soft. There is no mass.      Tenderness: There is no abdominal tenderness.   Musculoskeletal:         General: No swelling. Normal range of motion.      Comments: Left knee incision with silver dressing in place.  Mild localized edema.  No redness, drainage or increased warmth  "noted.    Skin:     General: Skin is warm and dry.      Findings: No rash.   Neurological:      General: No focal deficit present.      Mental Status: She is alert and oriented to person, place, and time. Mental status is at baseline.       No lab exists for component: \"CBC BMP\"  Assessment/Plan   Problem List Items Addressed This Visit             ICD-10-CM    Primary osteoarthritis of both knees - Primary M17.0    Sciatica M54.30    S/P total knee arthroplasty, left Z96.652     Continue PT/OT.  Follow up with dr Prasad as ordered.   Pain management with Norco.  Aspirin for DVT prophylaxis.          Primary hypertension I10     Cardizem added for Htn and tachycardia.  Seen by Dr Sanches at facility.          Drug-induced constipation K59.03     Taking norco for pain management.  Add colace for constipation issues.                 Time spent: 30 min in review of chart, labs and orders, consultation with pt and documentation.   "

## 2023-11-02 NOTE — LETTER
Patient: Alexandria Rai  : 1940    Encounter Date: 2023    No chief complaint on file.      Subjective  [id number] : Alexandria Rai is a 83 y.o. female admitted to [facility] for rehab.     HPI  Pt with h/o osteoarthritis of the knee underwent an elective left TKA with Dr Prasad on 10/23.   Pt seen while resting in bed.  She is alert, pleasant and interactive.  She does not appear to be in any pain.  She is taking some norco for pain about 1-2 x/day.   She was seen by cardiologist at facility due to HTN and tachycardia.  Started on cardizem which is to start today.   She has no signficant lower leg edema.  No new issues or concerns per nursing.     Review of Systems   Constitutional:  Negative for chills and fever.   Respiratory:  Negative for cough, shortness of breath and wheezing.    Cardiovascular:  Negative for leg swelling.   Gastrointestinal:  Positive for constipation. Negative for abdominal pain, diarrhea, nausea and vomiting.   Psychiatric/Behavioral:  The patient is not nervous/anxious.    All other systems reviewed and are negative.      Objective  /85   Pulse 104   Temp 36.5 °C (97.7 °F)   Resp 16   Wt 74.1 kg (163 lb 6.4 oz)   SpO2 92%   BMI 31.91 kg/m²    Physical Exam  Constitutional:       General: She is not in acute distress.  Eyes:      Conjunctiva/sclera: Conjunctivae normal.      Pupils: Pupils are equal, round, and reactive to light.   Cardiovascular:      Rate and Rhythm: Normal rate and regular rhythm.      Heart sounds: No murmur heard.  Pulmonary:      Effort: Pulmonary effort is normal.      Breath sounds: No wheezing, rhonchi or rales.   Abdominal:      General: Abdomen is flat. Bowel sounds are normal. There is no distension.      Palpations: Abdomen is soft. There is no mass.      Tenderness: There is no abdominal tenderness.   Musculoskeletal:         General: No swelling. Normal range of motion.      Comments: Left knee incision with silver dressing in  "place.  Mild localized edema.  No redness, drainage or increased warmth noted.    Skin:     General: Skin is warm and dry.      Findings: No rash.   Neurological:      General: No focal deficit present.      Mental Status: She is alert and oriented to person, place, and time. Mental status is at baseline.       No lab exists for component: \"CBC BMP\"  Assessment/Plan  Problem List Items Addressed This Visit             ICD-10-CM    Primary osteoarthritis of both knees - Primary M17.0    Sciatica M54.30    S/P total knee arthroplasty, left Z96.652     Continue PT/OT.  Follow up with dr Prasad as ordered.   Pain management with Norco.  Aspirin for DVT prophylaxis.          Primary hypertension I10     Cardizem added for Htn and tachycardia.  Seen by Dr Sanches at facility.          Drug-induced constipation K59.03     Taking norco for pain management.  Add colace for constipation issues.                 Time spent: 30 min in review of chart, labs and orders, consultation with pt and documentation.       Electronically Signed By: Marilee Morin PA-C   11/2/23  6:56 PM  "

## 2023-11-02 NOTE — ASSESSMENT & PLAN NOTE
Continue PT/OT.  Follow up with dr Prasad as ordered.   Pain management with Norco.  Aspirin for DVT prophylaxis.

## 2023-11-03 ENCOUNTER — HOSPITAL ENCOUNTER (OUTPATIENT)
Dept: CARDIOLOGY | Facility: HOSPITAL | Age: 83
Discharge: HOME | End: 2023-11-03
Payer: COMMERCIAL

## 2023-11-03 LAB
ATRIAL RATE: 103 BPM
P AXIS: 67 DEGREES
P OFFSET: 178 MS
P ONSET: 115 MS
PR INTERVAL: 208 MS
Q ONSET: 219 MS
QRS COUNT: 17 BEATS
QRS DURATION: 84 MS
QT INTERVAL: 370 MS
QTC CALCULATION(BAZETT): 484 MS
QTC FREDERICIA: 443 MS
R AXIS: 4 DEGREES
T AXIS: 37 DEGREES
T OFFSET: 404 MS
VENTRICULAR RATE: 103 BPM

## 2023-11-03 PROCEDURE — 93005 ELECTROCARDIOGRAM TRACING: CPT

## 2023-11-09 ENCOUNTER — NURSING HOME VISIT (OUTPATIENT)
Dept: POST ACUTE CARE | Facility: EXTERNAL LOCATION | Age: 83
End: 2023-11-09
Payer: COMMERCIAL

## 2023-11-09 VITALS
DIASTOLIC BLOOD PRESSURE: 70 MMHG | WEIGHT: 163.4 LBS | SYSTOLIC BLOOD PRESSURE: 120 MMHG | OXYGEN SATURATION: 92 % | HEART RATE: 102 BPM | TEMPERATURE: 97.6 F | BODY MASS INDEX: 31.91 KG/M2 | RESPIRATION RATE: 16 BRPM

## 2023-11-09 DIAGNOSIS — I10 PRIMARY HYPERTENSION: ICD-10-CM

## 2023-11-09 DIAGNOSIS — K59.03 DRUG-INDUCED CONSTIPATION: ICD-10-CM

## 2023-11-09 DIAGNOSIS — Z96.652 S/P TOTAL KNEE ARTHROPLASTY, LEFT: ICD-10-CM

## 2023-11-09 DIAGNOSIS — M54.30 SCIATICA, UNSPECIFIED LATERALITY: ICD-10-CM

## 2023-11-09 DIAGNOSIS — M17.0 PRIMARY OSTEOARTHRITIS OF BOTH KNEES: Primary | ICD-10-CM

## 2023-11-09 PROCEDURE — 99309 SBSQ NF CARE MODERATE MDM 30: CPT | Performed by: PHYSICIAN ASSISTANT

## 2023-11-09 ASSESSMENT — ENCOUNTER SYMPTOMS
WHEEZING: 0
NAUSEA: 0
ABDOMINAL PAIN: 0
COUGH: 0
VOMITING: 0
CHILLS: 0
FEVER: 0
DIARRHEA: 0
CONSTIPATION: 0
SHORTNESS OF BREATH: 0
NERVOUS/ANXIOUS: 0

## 2023-11-09 NOTE — PROGRESS NOTES
No chief complaint on file.      Subjective   [id number] : Alexandria Rai is a 83 y.o. female admitted to [facility] for rehab.     HPI  Pt with h/o osteoarthritis of the knee underwent an elective left TKA with Dr Prasad on 10/23.   Pt seen while sitting up in wheelchair.  She is alert and pleasant.  She does not appear to be in any pain.   Pain is adequately controlled with norco prn.   She had follow up with surgeon this week.  Staples were removed.  She is planning for discharge to home.  She will require a front wheeled walker at discharge to facilitate mobility in the home setting. She continues on carvedilol which has helped her  bp and pulse.  She has no signficant lower leg edema.  No new issues or concerns per nursing.     Review of Systems   Constitutional:  Negative for chills and fever.   Respiratory:  Negative for cough, shortness of breath and wheezing.    Cardiovascular:  Negative for leg swelling.   Gastrointestinal:  Negative for abdominal pain, constipation, diarrhea, nausea and vomiting.   Psychiatric/Behavioral:  The patient is not nervous/anxious.    All other systems reviewed and are negative.      Objective   /70   Pulse 102   Temp 36.4 °C (97.6 °F)   Resp 16   Wt 74.1 kg (163 lb 6.4 oz)   SpO2 92%   BMI 31.91 kg/m²    Physical Exam  Constitutional:       General: She is not in acute distress.  Eyes:      Conjunctiva/sclera: Conjunctivae normal.      Pupils: Pupils are equal, round, and reactive to light.   Cardiovascular:      Rate and Rhythm: Normal rate and regular rhythm.      Heart sounds: No murmur heard.  Pulmonary:      Effort: Pulmonary effort is normal.      Breath sounds: No wheezing, rhonchi or rales.   Abdominal:      General: Abdomen is flat. Bowel sounds are normal. There is no distension.      Palpations: Abdomen is soft. There is no mass.      Tenderness: There is no abdominal tenderness.   Musculoskeletal:         General: No swelling. Normal range of motion.  "     Comments: Left incision approximated and healing.  Staples have been removed.   Mild localized edema.  No redness, drainage or increased warmth noted.    Skin:     General: Skin is warm and dry.      Findings: No rash.   Neurological:      General: No focal deficit present.      Mental Status: She is alert and oriented to person, place, and time. Mental status is at baseline.       No lab exists for component: \"CBC BMP\"  Assessment/Plan   Primary osteoarthritis of both knees - Primary M17.0      Sciatica M54.30     S/P total knee arthroplasty, left Z96.652       Continue PT/OT.  had Follow up with dr Prasad this week.  Pain management with Norco.  Aspirin for DVT prophylaxis.   Discharge planning.  Order FWW for home use.  She will have home health for RN/PT/OT.            Primary hypertension I10       Cardizem added for Htn and tachycardia.  Seen by Dr Sanches at facility.            Drug-induced constipation K59.03       Taking norco for pain management.  Add colace for constipation issues.             Time spent: 30 min in review of chart, labs and orders, consultation with pt and documentation.   "

## 2023-11-09 NOTE — LETTER
Patient: Alexandria Rai  : 1940    Encounter Date: 2023    No chief complaint on file.      Subjective  [id number] : Alexandria Rai is a 83 y.o. female admitted to [facility] for rehab.     HPI  Pt with h/o osteoarthritis of the knee underwent an elective left TKA with Dr Prasad on 10/23.   Pt seen while sitting up in wheelchair.  She is alert and pleasant.  She does not appear to be in any pain.   Pain is adequately controlled with norco prn.   She had follow up with surgeon this week.  Staples were removed.  She is planning for discharge to home.  She will require a front wheeled walker at discharge to facilitate mobility in the home setting. She continues on carvedilol which has helped her  bp and pulse.  She has no signficant lower leg edema.  No new issues or concerns per nursing.     Review of Systems   Constitutional:  Negative for chills and fever.   Respiratory:  Negative for cough, shortness of breath and wheezing.    Cardiovascular:  Negative for leg swelling.   Gastrointestinal:  Negative for abdominal pain, constipation, diarrhea, nausea and vomiting.   Psychiatric/Behavioral:  The patient is not nervous/anxious.    All other systems reviewed and are negative.      Objective  /70   Pulse 102   Temp 36.4 °C (97.6 °F)   Resp 16   Wt 74.1 kg (163 lb 6.4 oz)   SpO2 92%   BMI 31.91 kg/m²    Physical Exam  Constitutional:       General: She is not in acute distress.  Eyes:      Conjunctiva/sclera: Conjunctivae normal.      Pupils: Pupils are equal, round, and reactive to light.   Cardiovascular:      Rate and Rhythm: Normal rate and regular rhythm.      Heart sounds: No murmur heard.  Pulmonary:      Effort: Pulmonary effort is normal.      Breath sounds: No wheezing, rhonchi or rales.   Abdominal:      General: Abdomen is flat. Bowel sounds are normal. There is no distension.      Palpations: Abdomen is soft. There is no mass.      Tenderness: There is no abdominal tenderness.  "  Musculoskeletal:         General: No swelling. Normal range of motion.      Comments: Left incision approximated and healing.  Staples have been removed.   Mild localized edema.  No redness, drainage or increased warmth noted.    Skin:     General: Skin is warm and dry.      Findings: No rash.   Neurological:      General: No focal deficit present.      Mental Status: She is alert and oriented to person, place, and time. Mental status is at baseline.       No lab exists for component: \"CBC BMP\"  Assessment/Plan  Primary osteoarthritis of both knees - Primary M17.0      Sciatica M54.30     S/P total knee arthroplasty, left Z96.652       Continue PT/OT.  had Follow up with dr Prasad this week.  Pain management with Norco.  Aspirin for DVT prophylaxis.   Discharge planning.  Order FWW for home use.  She will have home health for RN/PT/OT.            Primary hypertension I10       Cardizem added for Htn and tachycardia.  Seen by Dr Sanches at facility.            Drug-induced constipation K59.03       Taking norco for pain management.  Add colace for constipation issues.             Time spent: 30 min in review of chart, labs and orders, consultation with pt and documentation.       Electronically Signed By: Marilee Morin PA-C   11/9/23  2:32 PM  "

## 2023-11-14 ENCOUNTER — TELEPHONE (OUTPATIENT)
Dept: PRIMARY CARE | Facility: CLINIC | Age: 83
End: 2023-11-14
Payer: COMMERCIAL

## 2023-11-14 NOTE — TELEPHONE ENCOUNTER
DIANA FROM [facility] CALLED AND STATES PT WAS DISCHARGED FROM [place] ON SAT. WILL PK FOLLOW FOR  HOME CARE SERVICES? PLEASE CALL [phone] TO CONFIRM

## 2023-12-13 ENCOUNTER — EVALUATION (OUTPATIENT)
Dept: PHYSICAL THERAPY | Facility: CLINIC | Age: 83
End: 2023-12-13
Payer: COMMERCIAL

## 2023-12-13 DIAGNOSIS — R26.9 ABNORMAL GAIT: ICD-10-CM

## 2023-12-13 DIAGNOSIS — M25.562 CHRONIC PAIN OF LEFT KNEE: Primary | Chronic | ICD-10-CM

## 2023-12-13 DIAGNOSIS — G89.29 CHRONIC PAIN OF LEFT KNEE: Primary | Chronic | ICD-10-CM

## 2023-12-13 DIAGNOSIS — Z96.652 S/P TOTAL KNEE ARTHROPLASTY, LEFT: ICD-10-CM

## 2023-12-13 PROCEDURE — 97140 MANUAL THERAPY 1/> REGIONS: CPT | Mod: GP | Performed by: PHYSICAL THERAPIST

## 2023-12-13 PROCEDURE — 97162 PT EVAL MOD COMPLEX 30 MIN: CPT | Mod: GP | Performed by: PHYSICAL THERAPIST

## 2023-12-13 PROCEDURE — 97110 THERAPEUTIC EXERCISES: CPT | Mod: GP | Performed by: PHYSICAL THERAPIST

## 2023-12-13 SDOH — ECONOMIC STABILITY: GENERAL: QUALITY OF LIFE: EXCELLENT

## 2023-12-13 ASSESSMENT — ENCOUNTER SYMPTOMS
PAIN SCALE: 6
QUALITY: DULL ACHE
PAIN SCALE AT LOWEST: 3
EXACERBATED BY: MOVEMENT

## 2023-12-13 ASSESSMENT — PAIN - FUNCTIONAL ASSESSMENT: PAIN_FUNCTIONAL_ASSESSMENT: 0-10

## 2023-12-13 ASSESSMENT — PAIN SCALES - GENERAL: PAINLEVEL_OUTOF10: 6

## 2023-12-13 ASSESSMENT — ACTIVITIES OF DAILY LIVING (ADL): POOR_BALANCE: 1

## 2023-12-13 NOTE — PROGRESS NOTES
Physical Therapy Evaluation and Treatment     Patient Name: Alexandria Rai  MRN: [id number]  PT Received On: 23  Time Calculation  Start Time: 161  Stop Time: 1715  Time Calculation (min): 60 min  PT Evaluation Time Entry  PT Evaluation (Moderate) Time Entry: 20  PT Therapeutic Procedures Time Entry  Manual Therapy Time Entry: 8  Therapeutic Exercise Time Entry: 30    Current Problem:   1. Chronic pain of left knee        2. S/P total knee arthroplasty, left  Referral to Physical Therapy      3. Abnormal gait          Precautions:   R knee severe arthritis and pain, L TKA 10/23/23    Subjective Evaluation    History of Present Illness  Onset date: 15 years ago.  Date of surgery: 10/23/2023  Mechanism of injury: Pt is a 84 y/o female with 15 year history knee pain and was unable to walk so 10/23/23 she had L TKA. Son is here interpreting. She did 6 sessions of PT last December, but then there were some family issues. ,  2 weeks at [facility] and 2-3 weeks HHPT. Mid  she will schedule her R knee to be replaced. Pain currently 5-6/10. Has a lot of pain in R knee, non-surgical knee.  Denies N/T/B.     Quality of life: excellent    Pain  Current pain ratin  At best pain rating: 3  Location: L knee 5-6/10  Quality: dull ache (gets elecctric shocks in L knee)  Aggravating factors: movement  Progression: improved    Social Support  Lives in: multiple-level home (14 Stairs, 6 CARMINE)  Lives with: adult children    Hand dominance: right    Diagnostic Tests  X-ray: abnormal (bone on bone in knees)           Objective     Active Range of Motion   Left Knee   Flexion: 110 degrees   Extension: 0 degrees   Extensor la degrees     Right Knee   Flexion: 114 degrees   Extension: 7 degrees   Extensor la degrees     Strength/Myotome Testing     Left Hip   Planes of Motion   Flexion: 4-  Extension: 4-  Abduction: 4    Right Hip   Planes of Motion   Flexion: 4-  Extension: 4-  Abduction:  4  Adduction: 4    Left Knee   Flexion: 4-  Extension: 4-  Quadriceps contraction: fair    Right Knee   Flexion: 4-  Extension: 4-  Quadriceps contraction: good    Left Ankle/Foot   Dorsiflexion: 4+  Plantar flexion: 4+    Right Ankle/Foot   Dorsiflexion: 4+  Plantar flexion: 4+        Romberg: Firm Eyes Open 30 sec, Eyes Closed 20 sec , Foam Eyes Open 30 sec, Eyes Closed 8 sec   Tandem: Firm Eyes Open 11 sec       Treatments:   HEP review and performed   SLR's flex very minimal ext lag  Quad sets with 0 ext  Heel slides to 120  Bridges  Standing hip abd  SLR 4 way  HR/TR's     Manual: STM, patellar mobility, scar massage, overpressure into flexion   Assessment & Plan     Assessment  Impairments: abnormal gait, abnormal muscle firing, abnormal or restricted ROM, activity intolerance, impaired balance, impaired physical strength, lacks appropriate home exercise program, pain with function and weight-bearing intolerance  Other impairment: swelling  Assessment details: Pt presents a month and a half post L TKA with minimal quad and hamstring weakness. Current 120 deg flexion, and achieved full extension. Pt demo fair quad contraction and fair hamstring strength with functional movement. Pt will benefit from skilled PT to address STG/LTG and return to functional ambulation without an AD and without pain or LOB.   Barriers to therapy: none  Prognosis: good    Plan  Therapy options: will be seen for skilled physical therapy services  Planned modality interventions: TENS, high voltage pulsed current (pain management), electrical stimulation/Russian stimulation, low level laser therapy and microcurrent electrical stimulation  Planned therapy interventions: ADL retraining, balance/weight-bearing training, bed mobility training, body mechanics training, gait training, functional ROM exercises, flexibility, home exercise program, transfer training, therapeutic activities, stretching, strengthening, soft tissue mobilization,  postural training, neuromuscular re-education, muscle pump exercises and manual therapy  Frequency: 2x week  Duration in weeks: 12  Treatment plan discussed with: patient and family       Moderate complexity due to patient's clinical presentation being evolving with changing characteristics, with comorbidities to include language barrier, cultural barrier, severe OA on R knee, recent L TKA, balance, all of which may negatively impact rehab tolerance and progression.     Post-Treatment Pain:  Response to Interventions: same in L, R knee a little worse    Goals:   Active       PT Problem       Knee goals       Start:  12/13/23    Expected End:  02/21/24       1) Pt will report pain levels no greater than 0/10 at worst with activity for less difficulty with active knee movement, standing, walking, transferring, and negotiating stairs.  2) Pt will display pain-free left knee AROM WFL for less difficulty walking, squatting, transferring, performing bed mobility, and negotiating stairs.  3) Pt will display pain-free hip and knee MMT of at least 5/5 for all major muscle groups for improved functional strength with walking, transferring, squatting, and negotiating stairs.  4) Pt will display symmetrical pain-free gait without AD and without compensation.  5) Pt will improve Oxford Knee Score to <10% impairment to indicate significant improvement in knee function.

## 2023-12-15 DIAGNOSIS — I10 HYPERTENSION, UNSPECIFIED TYPE: Primary | ICD-10-CM

## 2023-12-15 PROBLEM — M51.16 DISPLACEMENT OF LUMBAR INTERVERTEBRAL DISC WITH RADICULOPATHY: Status: ACTIVE | Noted: 2022-05-06

## 2023-12-15 PROBLEM — G89.29 CHRONIC LEFT SHOULDER PAIN: Status: ACTIVE | Noted: 2022-05-06

## 2023-12-15 PROBLEM — M25.561 CHRONIC PAIN OF BOTH KNEES: Status: ACTIVE | Noted: 2022-05-06

## 2023-12-15 PROBLEM — M48.062 SPINAL STENOSIS, LUMBAR REGION, WITH NEUROGENIC CLAUDICATION: Status: ACTIVE | Noted: 2022-05-06

## 2023-12-15 PROBLEM — M25.512 CHRONIC LEFT SHOULDER PAIN: Status: ACTIVE | Noted: 2022-05-06

## 2023-12-15 PROBLEM — M25.562 CHRONIC PAIN OF BOTH KNEES: Status: ACTIVE | Noted: 2022-05-06

## 2023-12-15 PROBLEM — M19.011 LOCALIZED, PRIMARY OSTEOARTHRITIS OF SHOULDER REGION, RIGHT: Status: ACTIVE | Noted: 2022-05-06

## 2023-12-15 PROBLEM — G89.29 CHRONIC PAIN OF BOTH KNEES: Status: ACTIVE | Noted: 2022-05-06

## 2023-12-15 RX ORDER — METOPROLOL TARTRATE 25 MG/1
25 TABLET, FILM COATED ORAL DAILY
COMMUNITY
Start: 2023-10-31 | End: 2024-02-05 | Stop reason: WASHOUT

## 2023-12-15 RX ORDER — DILTIAZEM HYDROCHLORIDE 120 MG/1
120 CAPSULE, COATED, EXTENDED RELEASE ORAL 2 TIMES DAILY
COMMUNITY
Start: 2023-11-11 | End: 2023-12-15 | Stop reason: SDUPTHER

## 2023-12-20 PROCEDURE — RXMED WILLOW AMBULATORY MEDICATION CHARGE

## 2023-12-20 RX ORDER — DILTIAZEM HYDROCHLORIDE 120 MG/1
120 CAPSULE, COATED, EXTENDED RELEASE ORAL 2 TIMES DAILY
Qty: 180 CAPSULE | Refills: 3 | Status: SHIPPED | OUTPATIENT
Start: 2023-12-20 | End: 2024-12-14

## 2023-12-21 ENCOUNTER — TREATMENT (OUTPATIENT)
Dept: PHYSICAL THERAPY | Facility: CLINIC | Age: 83
End: 2023-12-21
Payer: COMMERCIAL

## 2023-12-21 ENCOUNTER — PHARMACY VISIT (OUTPATIENT)
Dept: PHARMACY | Facility: CLINIC | Age: 83
End: 2023-12-21
Payer: COMMERCIAL

## 2023-12-21 DIAGNOSIS — M25.561 CHRONIC PAIN OF BOTH KNEES: Primary | ICD-10-CM

## 2023-12-21 DIAGNOSIS — R26.9 ABNORMAL GAIT: ICD-10-CM

## 2023-12-21 DIAGNOSIS — G89.29 CHRONIC PAIN OF LEFT KNEE: Chronic | ICD-10-CM

## 2023-12-21 DIAGNOSIS — G89.29 CHRONIC PAIN OF BOTH KNEES: Primary | ICD-10-CM

## 2023-12-21 DIAGNOSIS — M25.562 CHRONIC PAIN OF BOTH KNEES: Primary | ICD-10-CM

## 2023-12-21 DIAGNOSIS — Z96.652 S/P TOTAL KNEE ARTHROPLASTY, LEFT: ICD-10-CM

## 2023-12-21 DIAGNOSIS — M25.562 CHRONIC PAIN OF LEFT KNEE: Chronic | ICD-10-CM

## 2023-12-21 PROCEDURE — 97110 THERAPEUTIC EXERCISES: CPT | Mod: GP

## 2023-12-21 ASSESSMENT — PAIN SCALES - GENERAL: PAINLEVEL_OUTOF10: 8

## 2023-12-21 ASSESSMENT — PAIN - FUNCTIONAL ASSESSMENT: PAIN_FUNCTIONAL_ASSESSMENT: 0-10

## 2023-12-21 NOTE — PROGRESS NOTES
Physical Therapy Treatment    Patient Name: Alexandria Rai  MRN: [id number]  PT Received On: 12/21/23  Time Calculation  Start Time: 1300  Stop Time: 1348  Time Calculation (min): 48 min  PT Modalities Time Entry  Hot/Cold Pack Time Entry: 5  PT Therapeutic Procedures Time Entry  Therapeutic Exercise Time Entry: 35  Gait Training Time Entry: 3  Visit Number: Eval +1 of 23  Visits/Dates Authorized: Current Problem: 23 visits (69 units) by 3/13/24 ther-ex, gait, NMR, estim    1. Chronic pain of both knees        2. S/P total knee arthroplasty, left  Follow Up In Physical Therapy      3. Chronic pain of left knee  Follow Up In Physical Therapy      4. Abnormal gait  Follow Up In Physical Therapy        Surgery: L TKA   Surgery date: 10/23/23    Precautions:   Precautions  Precautions Comment: Non-English speaking, son translates  R knee severe arthritis and pain    Subjective   General:   General Comment: Son reports pt had near fall on stairs yesterday due to R knee giving out, awaiting TKR after healed with L TKR    Pre-Treatment Symptoms:   Pain Assessment: 0-10  Pain Score: 8 (R knee pain > L, worse with near fall yesterday upon R knee giving way with stairs.)    Objective   Findings:   Intervals of pt sitting briefly due to pain R knee    Treatments:      Therapeutic Exercise:   NuStep L4 6min  RB static AP  RB static ML  Hurdles FW/side 6in  TKE band R/L yellow to fatigue  Step up L knee 3in  Sidestepping R/L  Seated HS curl orange    HEP:  SLR's flex very minimal ext lag  Quad sets with 0 ext  Heel slides to 120  Bridges  Standing hip abd  SLR 4 way  HR/TR's     Neuromuscular Re-Ed:       Therapeutic Activity:      Gait Training:   Instructed use of cane L hand for unloading R knee due to greater R knee pain at this time then post-op pain.    Manual Therapy:   Manual: STM, patellar mobility, scar massage, overpressure into flexion     Modalities:   Cold pack applied to R knee seated approx 5min       Assessment:    PT Assessment  Assessment Comment: Pt makes great effort. R knee pain limits POC for L knee.    Post-Treatment Symptoms:   Response to Interventions: intervals of seated rest needed due to increased R knee pain with exercise, required modification of exercises at times as well.    Plan:    Advance to ability within limits of R knee pain.    Goals:   Active       PT Problem       Knee goals       Start:  12/13/23    Expected End:  02/21/24       1) Pt will report pain levels no greater than 0/10 at worst with activity for less difficulty with active knee movement, standing, walking, transferring, and negotiating stairs.  2) Pt will display pain-free left knee AROM WFL for less difficulty walking, squatting, transferring, performing bed mobility, and negotiating stairs.  3) Pt will display pain-free hip and knee MMT of at least 5/5 for all major muscle groups for improved functional strength with walking, transferring, squatting, and negotiating stairs.  4) Pt will display symmetrical pain-free gait without AD and without compensation.  5) Pt will improve Oxford Knee Score to <10% impairment to indicate significant improvement in knee function.

## 2024-01-04 ENCOUNTER — TREATMENT (OUTPATIENT)
Dept: PHYSICAL THERAPY | Facility: CLINIC | Age: 84
End: 2024-01-04
Payer: COMMERCIAL

## 2024-01-04 DIAGNOSIS — M25.562 CHRONIC PAIN OF LEFT KNEE: Chronic | ICD-10-CM

## 2024-01-04 DIAGNOSIS — M25.561 CHRONIC PAIN OF BOTH KNEES: Primary | ICD-10-CM

## 2024-01-04 DIAGNOSIS — G89.29 CHRONIC PAIN OF BOTH KNEES: Primary | ICD-10-CM

## 2024-01-04 DIAGNOSIS — Z96.652 S/P TOTAL KNEE ARTHROPLASTY, LEFT: ICD-10-CM

## 2024-01-04 DIAGNOSIS — R26.9 ABNORMAL GAIT: ICD-10-CM

## 2024-01-04 DIAGNOSIS — G89.29 CHRONIC PAIN OF LEFT KNEE: Chronic | ICD-10-CM

## 2024-01-04 DIAGNOSIS — M25.562 CHRONIC PAIN OF BOTH KNEES: Primary | ICD-10-CM

## 2024-01-04 PROCEDURE — 97110 THERAPEUTIC EXERCISES: CPT | Mod: GP,CQ

## 2024-01-04 ASSESSMENT — PAIN - FUNCTIONAL ASSESSMENT: PAIN_FUNCTIONAL_ASSESSMENT: 0-10

## 2024-01-04 ASSESSMENT — PAIN SCALES - GENERAL: PAINLEVEL_OUTOF10: 5 - MODERATE PAIN

## 2024-01-04 NOTE — PROGRESS NOTES
Physical Therapy Treatment    Patient Name: Alexandria Rai  MRN: [id number]  PT Received On: 01/04/24  Time Calculation  Start Time: 0430  Stop Time: 0515  Time Calculation (min): 45 min  PT Therapeutic Procedures Time Entry  Therapeutic Exercise Time Entry: 45  Visit Number: Eval +2 of 23  Visits/Dates Authorized: Current Problem: 23 visits (69 units) by 3/13/24 ther-ex, gait, NMR, estim    1. Chronic pain of both knees        2. S/P total knee arthroplasty, left  Follow Up In Physical Therapy      3. Chronic pain of left knee  Follow Up In Physical Therapy      4. Abnormal gait  Follow Up In Physical Therapy        Surgery: L TKA   Surgery date: 10/23/23    Precautions:      R knee severe arthritis and pain    Subjective   General:      Pre-Treatment Symptoms:   Pain Assessment: 0-10  Pain Score: 5 - Moderate pain  Objective   Findings: 118 heel slide  0 degrees extension  Intervals of pt sitting briefly due to pain R knee    Treatments:      Therapeutic Exercise:   NuStep L4 6min  RB static AP  RB static ML  Hurdles FW/side 6in  TKE band R/L yellow to fatigue  Step up L knee 3in trial department stairs but to high did well with 3 inches and able to do with left No ue  hold off on right due to pain  Sidestepping R/L  Seated HS curl orange  Saq#3 2x10 right and left    HEP:  SLR's flex very minimal ext lag  Quad sets with 0 ext  Heel slides to 120  Bridges  Standing hip abd  SLR 4 way  HR/TR's     Neuromuscular Re-Ed:       Therapeutic Activity:      Gait Training: DNP  Instructed use of cane L hand for unloading R knee due to greater R knee pain at this time then post-op pain.    Manual Therapy: DNP)  Manual: STM, patellar mobility, scar massage, overpressure into flexion     Modalities:   Cold pack applied to R knee seated approx 5min   declinded    Assessment:    Good effort with session. Needs rest breaks thruout session  Jan 17th Md appt for follow up  Able to do SLR without extension lag but needs reminders to  keep quad tight and not rush  Post-Treatment Symptoms:   Response to Interventions: Pain more in right knee, which she is having surgery on this month    Plan:    Advance to ability within limits of R knee pain.    Goals:   Active       PT Problem       Knee goals       Start:  12/13/23    Expected End:  02/21/24       1) Pt will report pain levels no greater than 0/10 at worst with activity for less difficulty with active knee movement, standing, walking, transferring, and negotiating stairs.  2) Pt will display pain-free left knee AROM WFL for less difficulty walking, squatting, transferring, performing bed mobility, and negotiating stairs.  3) Pt will display pain-free hip and knee MMT of at least 5/5 for all major muscle groups for improved functional strength with walking, transferring, squatting, and negotiating stairs.  4) Pt will display symmetrical pain-free gait without AD and without compensation.  5) Pt will improve Oxford Knee Score to <10% impairment to indicate significant improvement in knee function.

## 2024-01-08 ENCOUNTER — TREATMENT (OUTPATIENT)
Dept: PHYSICAL THERAPY | Facility: CLINIC | Age: 84
End: 2024-01-08
Payer: COMMERCIAL

## 2024-01-08 DIAGNOSIS — M25.562 CHRONIC PAIN OF LEFT KNEE: Chronic | ICD-10-CM

## 2024-01-08 DIAGNOSIS — G89.29 CHRONIC PAIN OF LEFT KNEE: Chronic | ICD-10-CM

## 2024-01-08 DIAGNOSIS — R26.9 ABNORMAL GAIT: ICD-10-CM

## 2024-01-08 DIAGNOSIS — M25.561 CHRONIC PAIN OF BOTH KNEES: Primary | ICD-10-CM

## 2024-01-08 DIAGNOSIS — Z96.652 S/P TOTAL KNEE ARTHROPLASTY, LEFT: ICD-10-CM

## 2024-01-08 DIAGNOSIS — G89.29 CHRONIC PAIN OF BOTH KNEES: Primary | ICD-10-CM

## 2024-01-08 DIAGNOSIS — M25.562 CHRONIC PAIN OF BOTH KNEES: Primary | ICD-10-CM

## 2024-01-08 PROCEDURE — 97110 THERAPEUTIC EXERCISES: CPT | Mod: GP

## 2024-01-08 ASSESSMENT — PAIN - FUNCTIONAL ASSESSMENT: PAIN_FUNCTIONAL_ASSESSMENT: 0-10

## 2024-01-08 NOTE — PROGRESS NOTES
"Physical Therapy Treatment    Patient Name: Alexandria Rai  MRN: [id number]  PT Received On: 01/08/24  Time Calculation  Start Time: 0500  Stop Time: 0539  Time Calculation (min): 39 min  PT Therapeutic Procedures Time Entry  Therapeutic Exercise Time Entry: 39  Visit Number: Eval +3 of 23  Visits/Dates Authorized: Current Problem: 23 visits (69 units) by 3/13/24 ther-ex, gait, NMR, estim    1. Chronic pain of both knees        2. S/P total knee arthroplasty, left  Follow Up In Physical Therapy      3. Chronic pain of left knee  Follow Up In Physical Therapy      4. Abnormal gait  Follow Up In Physical Therapy        Surgery: L TKA   Surgery date: 10/23/23    Precautions:    R knee severe arthritis and pain    Subjective   General:   Pt states she feels much better. Scheduled for R TKA.      Pre-Treatment Symptoms:   Pain Assessment: 0-10  Pain Score:  (left knee shin pain)  Objective   Findings: 118 heel slide  0 degrees extension  Intervals of pt sitting briefly due to pain R knee    Treatments:      Therapeutic Exercise:   NuStep L4 6min  STS x10   LAQ 2x10  HR x15   Tloop hip abd/ext x10   Seated tloop hip flex x10   RB AP/ML taps and balance  3\" FW LLE step up  6\" FW step over x10  Seated hip abd/add    DNP:  RB static AP  RB static ML  Hurdles FW/side 6in  TKE band R/L yellow to fatigue  Step up L knee 3in trial department stairs but to high did well with 3 inches and able to do with left No ue  hold off on right due to pain  Sidestepping R/L  Seated HS curl orange  Saq#3 2x10 right and left  SLR's flex very minimal ext lag  Quad sets with 0 ext  Heel slides to 120  Bridges  Standing hip abd  SLR 4 way  HR/TR's     Neuromuscular Re-Ed:       Therapeutic Activity:      Gait Training: DNP  Instructed use of cane L hand for unloading R knee due to greater R knee pain at this time then post-op pain.    Manual Therapy: DNP)  Manual: STM, patellar mobility, scar massage, overpressure into flexion     Modalities: "   Cold pack applied to R knee seated approx 5min   declinded    Assessment:   Left knee doing well; patient complain of left shin hurting with pocket of swelling. R knee is limiting progression due to limited tolerance to activity.      Post-Treatment Symptoms:    R knee pain    Plan:    Advance to ability within limits of R knee pain.    Goals:   Active       PT Problem       Knee goals       Start:  12/13/23    Expected End:  02/21/24       1) Pt will report pain levels no greater than 0/10 at worst with activity for less difficulty with active knee movement, standing, walking, transferring, and negotiating stairs.  2) Pt will display pain-free left knee AROM WFL for less difficulty walking, squatting, transferring, performing bed mobility, and negotiating stairs.  3) Pt will display pain-free hip and knee MMT of at least 5/5 for all major muscle groups for improved functional strength with walking, transferring, squatting, and negotiating stairs.  4) Pt will display symmetrical pain-free gait without AD and without compensation.  5) Pt will improve Oxford Knee Score to <10% impairment to indicate significant improvement in knee function.

## 2024-01-11 ENCOUNTER — TREATMENT (OUTPATIENT)
Dept: PHYSICAL THERAPY | Facility: CLINIC | Age: 84
End: 2024-01-11
Payer: COMMERCIAL

## 2024-01-11 DIAGNOSIS — Z96.652 S/P TOTAL KNEE ARTHROPLASTY, LEFT: ICD-10-CM

## 2024-01-11 DIAGNOSIS — M25.562 CHRONIC PAIN OF BOTH KNEES: Primary | ICD-10-CM

## 2024-01-11 DIAGNOSIS — M25.561 CHRONIC PAIN OF BOTH KNEES: Primary | ICD-10-CM

## 2024-01-11 DIAGNOSIS — G89.29 CHRONIC PAIN OF BOTH KNEES: Primary | ICD-10-CM

## 2024-01-11 DIAGNOSIS — G89.29 CHRONIC PAIN OF LEFT KNEE: Chronic | ICD-10-CM

## 2024-01-11 DIAGNOSIS — R26.9 ABNORMAL GAIT: ICD-10-CM

## 2024-01-11 DIAGNOSIS — M25.562 CHRONIC PAIN OF LEFT KNEE: Chronic | ICD-10-CM

## 2024-01-11 PROCEDURE — 97110 THERAPEUTIC EXERCISES: CPT | Mod: GP,CQ

## 2024-01-11 ASSESSMENT — PAIN - FUNCTIONAL ASSESSMENT: PAIN_FUNCTIONAL_ASSESSMENT: 0-10

## 2024-01-11 ASSESSMENT — PAIN SCALES - GENERAL: PAINLEVEL_OUTOF10: 3

## 2024-01-11 NOTE — PROGRESS NOTES
"Physical Therapy Treatment    Patient Name: Alexandria Rai  MRN: [id number]  PT Received On: 01/11/24  Time Calculation  Start Time: 0430  Stop Time: 0510  Time Calculation (min): 40 min  PT Therapeutic Procedures Time Entry  Therapeutic Exercise Time Entry: 40  Visit Number: Eval +4 of 23  Visits/Dates Authorized: Current Problem: 23 visits (69 units) by 3/13/24 ther-ex, gait, NMR, estim    1. Chronic pain of both knees        2. S/P total knee arthroplasty, left  Follow Up In Physical Therapy      3. Chronic pain of left knee  Follow Up In Physical Therapy      4. Abnormal gait  Follow Up In Physical Therapy          Surgery: L TKA   Surgery date: 10/23/23    Precautions:    R knee severe arthritis and pain    Subjective   General:   Left leg is feeling better.   Will find out when rightt is having right knee surgery     Pre-Treatment Symptoms:   Pain Assessment: 0-10  Pain Score: 3  Objective   Findings: 118 heel slide  0 degrees extension  Intervals of pt sitting briefly due to pain R knee    Treatments:      Therapeutic Exercise:   NuStep L4 7 min  STS x10   LAQ 2x10  HR x20    Tloop hip abd/ext x10   Seated tloop hip flex x10  or with #2 wgts  RB AP/ML taps and balance  3\" FW LLE step up left only 5 inches with UE support  Step tap 5 inches  Seated hip abd/add  Ham curl 30# 2x10  Laq off machine 1.5# 2x10 right and left  Hurdles fwd and side with UE    DNP:  RB static AP  RB static ML  Hurdles FW/side 6in  TKE band R/L yellow to fatigue  Step up L knee 3in trial department stairs but to high did well with 3 inches and able to do with left No ue  hold off on right due to pain  Sidestepping R/L  Seated HS curl orange  Saq#3 2x10 right and left  SLR's flex very minimal ext lag  Quad sets with 0 ext  Heel slides to 120  Bridges  Standing hip abd  SLR 4 way  HR/TR's     Neuromuscular Re-Ed:       Therapeutic Activity:      Gait Training: DNP  Instructed use of cane L hand for unloading R knee due to greater R knee " pain at this time then post-op pain.    Manual Therapy: DNP)  Manual: STM, patellar mobility, scar massage, overpressure into flexion     Modalities:   Cold pack applied to R knee seated approx 5min   declinded    Assessment:   Scab is almost resolved on knee. Good effort with session but right knee limits progression with CK[facility] activities  Post-Treatment Symptoms:   Response to Interventions: pain more in right knee not surgical lkneeR knee pain    Plan:    Advance to ability within limits of R knee pain.    Goals:   Active       PT Problem       Knee goals       Start:  12/13/23    Expected End:  02/21/24       1) Pt will report pain levels no greater than 0/10 at worst with activity for less difficulty with active knee movement, standing, walking, transferring, and negotiating stairs.  2) Pt will display pain-free left knee AROM WFL for less difficulty walking, squatting, transferring, performing bed mobility, and negotiating stairs.  3) Pt will display pain-free hip and knee MMT of at least 5/5 for all major muscle groups for improved functional strength with walking, transferring, squatting, and negotiating stairs.  4) Pt will display symmetrical pain-free gait without AD and without compensation.  5) Pt will improve Oxford Knee Score to <10% impairment to indicate significant improvement in knee function.

## 2024-01-14 NOTE — PROGRESS NOTES
"Physical Therapy Treatment    Patient Name: Alexandria Rai  MRN: [id number]     Time Calculation  Start Time: 0455  Stop Time: 0533  Time Calculation (min): 38 min  PT Therapeutic Procedures Time Entry  Neuromuscular Re-Education Time Entry: 10  Therapeutic Exercise Time Entry: 28  Visit Number: Eval +5 of 23  Visits/Dates Authorized: Current Problem: 23 visits (69 units) by 3/13/24 ther-ex, gait, NMR, estim    1. Chronic pain of both knees        2. S/P total knee arthroplasty, left  Follow Up In Physical Therapy      3. Chronic pain of left knee  Follow Up In Physical Therapy      4. Abnormal gait  Follow Up In Physical Therapy            Surgery: L TKA   Surgery date: 10/23/23    Precautions:    R knee severe arthritis and pain    Subjective   General:   Knee is feeling better     Pre-Treatment Symptoms:      Objective   Findings: 118 heel slide  0 degrees extension  Intervals of pt sitting briefly due to pain R knee    Treatments:      Therapeutic Exercise:   NuStep L4 7 min  Ham curl 35# 2x10  LAQ off machine 1.5# 2x10  Seated hip abd/add  Seated tloop hip flex 2x10 grn  or with #2 wgts  HR x20   Seated HR/TR    DNP  Tloop hip abd/ext x10    STS x10    RB AP/ML taps and balance  3\" FW LLE step up left only 5 inches with UE support  Step tap 5 inches  RB static AP  RB static ML  Hurdles FW/side 6in  TKE band R/L yellow to fatigue  Step up L knee 3in trial department stairs but to high did well with 3 inches and able to do with left No ue  hold off on right due to pain  Sidestepping R/L  Seated HS curl orange  Saq#3 2x10 right and left  SLR's flex very minimal ext lag  Quad sets with 0 ext  Heel slides to 120  Bridges  Standing hip abd  SLR 4 way  HR/TR's     Neuromuscular Re-Ed:   6\" hurdles step to FW/lat x3ea  Foam WBOS EO, EC, vert and horiz head turns  Attempted foam marches - too much on RLE    Therapeutic Activity:  Gait Training: DNP  Instructed use of cane L hand for unloading R knee due to greater R knee " pain at this time then post-op pain.  Manual Therapy: DNP)  Manual: STM, patellar mobility, scar massage, overpressure into flexion   Modalities:   Cold pack applied to R knee seated approx 5min   declinded    Assessment:   Pt demo good balance; no use of UE in // bars. Pt had mod R knee pain that continues to limit her. Seeing doctor soon to decide when R knee surgery will be.     Post-Treatment Symptoms:    R knee pain    Plan:    Advance to ability within limits of R knee pain.    Goals:   Active       PT Problem       Knee goals       Start:  12/13/23    Expected End:  02/21/24       1) Pt will report pain levels no greater than 0/10 at worst with activity for less difficulty with active knee movement, standing, walking, transferring, and negotiating stairs.  2) Pt will display pain-free left knee AROM WFL for less difficulty walking, squatting, transferring, performing bed mobility, and negotiating stairs.  3) Pt will display pain-free hip and knee MMT of at least 5/5 for all major muscle groups for improved functional strength with walking, transferring, squatting, and negotiating stairs.  4) Pt will display symmetrical pain-free gait without AD and without compensation.  5) Pt will improve Oxford Knee Score to <10% impairment to indicate significant improvement in knee function.

## 2024-01-15 ENCOUNTER — TREATMENT (OUTPATIENT)
Dept: PHYSICAL THERAPY | Facility: CLINIC | Age: 84
End: 2024-01-15
Payer: COMMERCIAL

## 2024-01-15 DIAGNOSIS — G89.29 CHRONIC PAIN OF LEFT KNEE: Chronic | ICD-10-CM

## 2024-01-15 DIAGNOSIS — M25.561 CHRONIC PAIN OF BOTH KNEES: Primary | ICD-10-CM

## 2024-01-15 DIAGNOSIS — R26.9 ABNORMAL GAIT: ICD-10-CM

## 2024-01-15 DIAGNOSIS — M25.562 CHRONIC PAIN OF BOTH KNEES: Primary | ICD-10-CM

## 2024-01-15 DIAGNOSIS — Z96.652 S/P TOTAL KNEE ARTHROPLASTY, LEFT: ICD-10-CM

## 2024-01-15 DIAGNOSIS — M25.562 CHRONIC PAIN OF LEFT KNEE: Chronic | ICD-10-CM

## 2024-01-15 DIAGNOSIS — G89.29 CHRONIC PAIN OF BOTH KNEES: Primary | ICD-10-CM

## 2024-01-15 PROCEDURE — 97110 THERAPEUTIC EXERCISES: CPT | Mod: GP

## 2024-01-15 PROCEDURE — 97112 NEUROMUSCULAR REEDUCATION: CPT | Mod: GP

## 2024-01-17 ENCOUNTER — APPOINTMENT (OUTPATIENT)
Dept: PHYSICAL THERAPY | Facility: CLINIC | Age: 84
End: 2024-01-17
Payer: COMMERCIAL

## 2024-01-18 ENCOUNTER — TREATMENT (OUTPATIENT)
Dept: PHYSICAL THERAPY | Facility: CLINIC | Age: 84
End: 2024-01-18
Payer: COMMERCIAL

## 2024-01-18 DIAGNOSIS — M25.562 CHRONIC PAIN OF BOTH KNEES: Primary | ICD-10-CM

## 2024-01-18 DIAGNOSIS — G89.29 CHRONIC PAIN OF LEFT KNEE: Chronic | ICD-10-CM

## 2024-01-18 DIAGNOSIS — Z96.652 S/P TOTAL KNEE ARTHROPLASTY, LEFT: ICD-10-CM

## 2024-01-18 DIAGNOSIS — M25.562 CHRONIC PAIN OF LEFT KNEE: Chronic | ICD-10-CM

## 2024-01-18 DIAGNOSIS — R26.9 ABNORMAL GAIT: ICD-10-CM

## 2024-01-18 DIAGNOSIS — M25.561 CHRONIC PAIN OF BOTH KNEES: Primary | ICD-10-CM

## 2024-01-18 DIAGNOSIS — G89.29 CHRONIC PAIN OF BOTH KNEES: Primary | ICD-10-CM

## 2024-01-18 PROCEDURE — 97110 THERAPEUTIC EXERCISES: CPT | Mod: GP,CQ

## 2024-01-18 ASSESSMENT — PAIN - FUNCTIONAL ASSESSMENT: PAIN_FUNCTIONAL_ASSESSMENT: 0-10

## 2024-01-18 ASSESSMENT — PAIN SCALES - GENERAL: PAINLEVEL_OUTOF10: 4

## 2024-01-18 NOTE — PROGRESS NOTES
"Physical Therapy Treatment    Patient Name: Alexandria Rai  MRN: [id number]  PT Received On: 01/18/24  Time Calculation  Start Time: 0435  Stop Time: 0504  Time Calculation (min): 29 min  PT Therapeutic Procedures Time Entry  Therapeutic Exercise Time Entry: 40  Visit Number: Eval +6 of 23  Visits/Dates Authorized: Current Problem: 23 visits (69 units) by 3/13/24 ther-ex, gait, NMR, estim    1. Chronic pain of both knees        2. S/P total knee arthroplasty, left  Follow Up In Physical Therapy      3. Chronic pain of left knee  Follow Up In Physical Therapy      4. Abnormal gait  Follow Up In Physical Therapy              Surgery: L TKA   Surgery date: 10/23/23    Precautions:    R knee severe arthritis and pain    Subjective   General:     Was tired after last session and knee pain in right after and needs some pain pills     Pre-Treatment Symptoms:   Pain Assessment: 0-10  Pain Score: 4  Objective   Findings: 118 heel slide  0 degrees extension  Intervals of pt sitting briefly due to pain R knee    Treatments:      Therapeutic Exercise:   NuStep L4 8 min  Ham curl 35# 2x10  LAQ off machine 1.5# 2x10  Seated hip abd/add  Seated tloop hip flex 2x10 grn  or with #2 wgts  HR x20   Seated HR/TR  Hurdles FW/side 6in  Bridges  STS x10    Sidestepping R/L  Saq#4 2x10  left  SLR 10s cues for hgt    DNP  Tloop hip abd/ext x10    RB AP/ML taps and balance  3\" FW LLE step up left only 5 inches with UE support  Step tap 5 inches  RB static AP  RB static ML  TKE band R/L yellow to fatigue  Step up L knee 3in trial department stairs but to high did well with 3 inches and able to do with left No ue  hold off on right due to pain  Seated HS curl orange  SLR's flex very minimal ext lag  Quad sets with 0 ext  Standing hip abd  SLR   HR/TR's     Neuromuscular Re-Ed: DNp  6\" hurdles step to FW/lat x3ea  Foam WBOS EO, EC, vert and horiz head turns  Attempted foam marches - too much on RLE    Therapeutic Activity:  Gait Training: " DNP  Instructed use of cane L hand for unloading R knee due to greater R knee pain at this time then post-op pain.  Manual Therapy: DNP)  Manual: STM, patellar mobility, scar massage, overpressure into flexion   Modalities:   Cold pack applied to R knee seated approx 5min   declinded    Assessment:   Feb 19th surgery for right knee. Right knee inhibits some progress with left knee  Will have at least 6 more scheduled prior to surgery     Post-Treatment Symptoms:   Response to Interventions: pain cont in right knee not so much left. otherwise just tiredR knee pain    Plan:    Advance to ability within limits of R knee pain.    Goals:   Active       PT Problem       Knee goals       Start:  12/13/23    Expected End:  02/21/24       1) Pt will report pain levels no greater than 0/10 at worst with activity for less difficulty with active knee movement, standing, walking, transferring, and negotiating stairs.  2) Pt will display pain-free left knee AROM WFL for less difficulty walking, squatting, transferring, performing bed mobility, and negotiating stairs.  3) Pt will display pain-free hip and knee MMT of at least 5/5 for all major muscle groups for improved functional strength with walking, transferring, squatting, and negotiating stairs.  4) Pt will display symmetrical pain-free gait without AD and without compensation.  5) Pt will improve Oxford Knee Score to <10% impairment to indicate significant improvement in knee function.

## 2024-01-25 ENCOUNTER — TREATMENT (OUTPATIENT)
Dept: PHYSICAL THERAPY | Facility: CLINIC | Age: 84
End: 2024-01-25
Payer: COMMERCIAL

## 2024-01-25 DIAGNOSIS — R26.9 ABNORMAL GAIT: ICD-10-CM

## 2024-01-25 DIAGNOSIS — G89.29 CHRONIC PAIN OF LEFT KNEE: Chronic | ICD-10-CM

## 2024-01-25 DIAGNOSIS — M25.562 CHRONIC PAIN OF LEFT KNEE: Chronic | ICD-10-CM

## 2024-01-25 DIAGNOSIS — Z96.652 S/P TOTAL KNEE ARTHROPLASTY, LEFT: ICD-10-CM

## 2024-01-25 PROCEDURE — 97110 THERAPEUTIC EXERCISES: CPT | Mod: GP | Performed by: PHYSICAL THERAPIST

## 2024-01-25 NOTE — PROGRESS NOTES
"Physical Therapy Treatment    Patient Name: Alexandria Rai  MRN: [id number]  PT Received On: 01/25/24  Time Calculation  Start Time: 0500  Stop Time: 0550  Time Calculation (min): 50 min  PT Modalities Time Entry  Hot/Cold Pack Time Entry: 10  PT Therapeutic Procedures Time Entry  Therapeutic Exercise Time Entry: 40  Visit Number: Eval +7 of 23  Visits/Dates Authorized: Current Problem: 23 visits (69 units) by 3/13/24 ther-ex, gait, NMR, estim    1. S/P total knee arthroplasty, left  Follow Up In Physical Therapy      2. Chronic pain of left knee  Follow Up In Physical Therapy      3. Abnormal gait  Follow Up In Physical Therapy              Surgery: L TKA   Surgery date: 10/23/23    Precautions:    R knee severe arthritis and pain    Subjective   General:     Pain ususally is a day later, having trouble bending L knee to get into a car, but otherwise feeling very good, feels she is about 80% on the L .      Pre-Treatment Symptoms:      Objective   Findings: 122 heel slide  0 degrees extension  Intervals of pt sitting briefly due to pain R knee    Treatments:      Therapeutic Exercise:   NuStep L4 8 min   Sidestepping R/L  Backward  walking counter top x 4  TKE L blue 20 x     Ham curl Blue with 6 inch roll  15x (35# 2x10 DNP)  SAQ off machine 2# 2x15 L,, 2# 4# 15 x R  Seated hip abd/add 15 x  Seated tloop hip flex 2x10 grn  or with #2 wgts  HR x20 DNP  Seated HR/TR DNP  Hurdles FW/side 6in DNP  Bridges  STS x10  DNP    Saq#4 2x10  left  SLR 10s cues for hgt    DNP  Tloop hip abd/ext x10    RB AP/ML taps and balance  3\" FW LLE step up left only 5 inches with UE support  Step tap 5 inches  RB static AP  RB static ML  TKE band R/L yellow to fatigue  Step up L knee 3in trial department stairs but to high did well with 3 inches and able to do with left No ue  hold off on right due to pain  Seated HS curl orange  SLR's flex very minimal ext lag  Quad sets with 0 ext  Standing hip abd  SLR   HR/TR's     Neuromuscular " "Re-Ed: Tad  6\" hurdles step to FW/lat x3ea  Foam WBOS EO, EC, vert and horiz head turns  Attempted foam marches - too much on RLE    Therapeutic Activity:  Gait Training: TAD  Instructed use of cane L hand for unloading R knee due to greater R knee pain at this time then post-op pain.  Manual Therapy: TAD)  Manual: STM, patellar mobility, scar massage, overpressure into flexion   Modalities:   Cold pack applied to R knee seated approx 10 min   Durga    Assessment:   Feb 19th surgery for right knee. Right knee continues to inhibit some progress with left knee     Post-Treatment Symptoms:    R knee pain 5-6/10    Plan:    Advance to ability within limits of R knee pain.    Goals:   Active       PT Problem       Knee goals       Start:  12/13/23    Expected End:  02/21/24       1) Pt will report pain levels no greater than 0/10 at worst with activity for less difficulty with active knee movement, standing, walking, transferring, and negotiating stairs.  2) Pt will display pain-free left knee AROM WFL for less difficulty walking, squatting, transferring, performing bed mobility, and negotiating stairs.  3) Pt will display pain-free hip and knee MMT of at least 5/5 for all major muscle groups for improved functional strength with walking, transferring, squatting, and negotiating stairs.  4) Pt will display symmetrical pain-free gait without AD and without compensation.  5) Pt will improve Oxford Knee Score to <10% impairment to indicate significant improvement in knee function.            "

## 2024-01-29 ENCOUNTER — TREATMENT (OUTPATIENT)
Dept: PHYSICAL THERAPY | Facility: CLINIC | Age: 84
End: 2024-01-29
Payer: COMMERCIAL

## 2024-01-29 DIAGNOSIS — G89.29 CHRONIC PAIN OF BOTH KNEES: Primary | ICD-10-CM

## 2024-01-29 DIAGNOSIS — M25.562 CHRONIC PAIN OF LEFT KNEE: Chronic | ICD-10-CM

## 2024-01-29 DIAGNOSIS — J30.9 ALLERGIC RHINITIS, UNSPECIFIED SEASONALITY, UNSPECIFIED TRIGGER: Primary | ICD-10-CM

## 2024-01-29 DIAGNOSIS — G89.29 CHRONIC PAIN OF LEFT KNEE: Chronic | ICD-10-CM

## 2024-01-29 DIAGNOSIS — Z96.652 S/P TOTAL KNEE ARTHROPLASTY, LEFT: ICD-10-CM

## 2024-01-29 DIAGNOSIS — R26.9 ABNORMAL GAIT: ICD-10-CM

## 2024-01-29 DIAGNOSIS — M25.561 CHRONIC PAIN OF BOTH KNEES: Primary | ICD-10-CM

## 2024-01-29 DIAGNOSIS — M25.562 CHRONIC PAIN OF BOTH KNEES: Primary | ICD-10-CM

## 2024-01-29 PROCEDURE — 97110 THERAPEUTIC EXERCISES: CPT | Mod: GP

## 2024-01-30 RX ORDER — LORATADINE 10 MG/1
10 TABLET ORAL DAILY
Qty: 30 TABLET | Refills: 11 | Status: SHIPPED | OUTPATIENT
Start: 2024-01-30 | End: 2024-02-05 | Stop reason: SDUPTHER

## 2024-02-01 ENCOUNTER — TREATMENT (OUTPATIENT)
Dept: PHYSICAL THERAPY | Facility: CLINIC | Age: 84
End: 2024-02-01
Payer: COMMERCIAL

## 2024-02-01 DIAGNOSIS — Z96.652 S/P TOTAL KNEE ARTHROPLASTY, LEFT: ICD-10-CM

## 2024-02-01 DIAGNOSIS — M25.561 CHRONIC PAIN OF BOTH KNEES: Primary | ICD-10-CM

## 2024-02-01 DIAGNOSIS — G89.29 CHRONIC PAIN OF LEFT KNEE: Chronic | ICD-10-CM

## 2024-02-01 DIAGNOSIS — M25.562 CHRONIC PAIN OF LEFT KNEE: Chronic | ICD-10-CM

## 2024-02-01 DIAGNOSIS — M25.562 CHRONIC PAIN OF BOTH KNEES: Primary | ICD-10-CM

## 2024-02-01 DIAGNOSIS — G89.29 CHRONIC PAIN OF BOTH KNEES: Primary | ICD-10-CM

## 2024-02-01 DIAGNOSIS — R26.9 ABNORMAL GAIT: ICD-10-CM

## 2024-02-01 PROCEDURE — 97110 THERAPEUTIC EXERCISES: CPT | Mod: GP,CQ

## 2024-02-01 ASSESSMENT — PAIN SCALES - GENERAL: PAINLEVEL_OUTOF10: 4

## 2024-02-01 ASSESSMENT — PAIN - FUNCTIONAL ASSESSMENT: PAIN_FUNCTIONAL_ASSESSMENT: 0-10

## 2024-02-01 NOTE — PROGRESS NOTES
"Physical Therapy Treatment    Patient Name: Alexandria Rai  MRN: [id number]  PT Received On: 02/01/24  Time Calculation  Start Time: 1454  Stop Time: 1545  Time Calculation (min): 51 min  PT Therapeutic Procedures Time Entry  Therapeutic Exercise Time Entry: 44  Visit Number: Eval +9 of 23  Visits/Dates Authorized: Current Problem: 23 visits (69 units) by 3/13/24 ther-ex, gait, NMR, estim    1. Chronic pain of both knees        2. S/P total knee arthroplasty, left  Follow Up In Physical Therapy      3. Chronic pain of left knee  Follow Up In Physical Therapy      4. Abnormal gait  Follow Up In Physical Therapy            Surgery: L TKA   Surgery date: 10/23/23    Precautions:    R knee severe arthritis and pain - Feb 19th surgery for right knee    Subjective   General:   Patient's son reports she is feeling the usual pain in R knee, the L knee is doing well.      Pre-Treatment Symptoms:   Pain Assessment: 0-10  Pain Score: 4    Objective   Findings: 122 heel slide  0 degrees extension  Intervals of pt sitting briefly due to pain R knee    Treatments:      Therapeutic Exercise:   NuStep L4 8 min   Tloop SS at counter x2 org  LAQ 0-2-5# 3x10 R/L  Seated tband HS curl 2x10 blue  STS x10  Seated hip flexion over 6\" pili 2x10 , and standing 1x10  Seated hip flexion with orange tloop 2x10    DNP:  Tball DKTC x25   Tball bridge 2x10    SAQ #4 on L / BW R 2x10  HL hip iso 10x5\" hld   Backward  walking counter top x 4  Ham curl Blue with 6 inch roll  15x (35# 2x10 DNP)  SAQ off machine 2# 2x15 L,, 2# 4# 15 x R  Seated hip abd/add 15 x  Seated tloop hip flex 2x10 grn  or with #2 wgts  Bridges  SLR 10  Tloop hip abd/ext x10    Step tap 5 inches  TKE band R/L yellow to fatigue  Seated HS curl orange  Quad sets with 0 ext  HR/TR's     Neuromuscular Re-Ed: DNP  6\" hurdles step to FW/lat x3ea  Foam WBOS EO, EC, vert and horiz head turns  Attempted foam marches - too much on RLE    Therapeutic Activity:    Gait Training: " DNP  Instructed use of cane L hand for unloading R knee due to greater R knee pain at this time then post-op pain.    Manual Therapy: DNP  Manual: STM, patellar mobility, scar massage, overpressure into flexion     Modalities: DNP  Cold pack applied to R knee seated approx 10 min   Durga    Assessment:   Patient had no complaints of added pain during session, just fatigue in L LE. Patient issued additional HEP to work on hamstring tightness and hip flexor strength.      Post-Treatment Symptoms:   Response to Interventions: tired    Plan:    Advance to ability within limits of R knee pain.    Goals:   Active       PT Problem       Knee goals       Start:  12/13/23    Expected End:  02/21/24       1) Pt will report pain levels no greater than 0/10 at worst with activity for less difficulty with active knee movement, standing, walking, transferring, and negotiating stairs.  2) Pt will display pain-free left knee AROM WFL for less difficulty walking, squatting, transferring, performing bed mobility, and negotiating stairs.  3) Pt will display pain-free hip and knee MMT of at least 5/5 for all major muscle groups for improved functional strength with walking, transferring, squatting, and negotiating stairs.  4) Pt will display symmetrical pain-free gait without AD and without compensation.  5) Pt will improve Oxford Knee Score to <10% impairment to indicate significant improvement in knee function.

## 2024-02-05 ENCOUNTER — OFFICE VISIT (OUTPATIENT)
Dept: PRIMARY CARE | Facility: CLINIC | Age: 84
End: 2024-02-05
Payer: COMMERCIAL

## 2024-02-05 ENCOUNTER — PHARMACY VISIT (OUTPATIENT)
Dept: PHARMACY | Facility: CLINIC | Age: 84
End: 2024-02-05
Payer: COMMERCIAL

## 2024-02-05 VITALS
HEIGHT: 60 IN | BODY MASS INDEX: 29.96 KG/M2 | SYSTOLIC BLOOD PRESSURE: 126 MMHG | TEMPERATURE: 97.3 F | RESPIRATION RATE: 18 BRPM | WEIGHT: 152.6 LBS | DIASTOLIC BLOOD PRESSURE: 78 MMHG | HEART RATE: 94 BPM | OXYGEN SATURATION: 96 %

## 2024-02-05 DIAGNOSIS — R53.83 OTHER FATIGUE: ICD-10-CM

## 2024-02-05 DIAGNOSIS — J30.9 ALLERGIC RHINITIS, UNSPECIFIED SEASONALITY, UNSPECIFIED TRIGGER: ICD-10-CM

## 2024-02-05 DIAGNOSIS — Z13.220 SCREENING FOR LIPID DISORDERS: ICD-10-CM

## 2024-02-05 DIAGNOSIS — Z01.818 PREOP EXAMINATION: ICD-10-CM

## 2024-02-05 DIAGNOSIS — R35.0 URINARY FREQUENCY: ICD-10-CM

## 2024-02-05 DIAGNOSIS — E55.9 VITAMIN D DEFICIENCY: ICD-10-CM

## 2024-02-05 DIAGNOSIS — I10 PRIMARY HYPERTENSION: Primary | ICD-10-CM

## 2024-02-05 DIAGNOSIS — N39.0 URINARY TRACT INFECTION WITH HEMATURIA, SITE UNSPECIFIED: ICD-10-CM

## 2024-02-05 DIAGNOSIS — R31.9 URINARY TRACT INFECTION WITH HEMATURIA, SITE UNSPECIFIED: ICD-10-CM

## 2024-02-05 LAB
25(OH)D3 SERPL-MCNC: 43 NG/ML (ref 31–100)
ALBUMIN SERPL-MCNC: 4.3 G/DL (ref 3.5–5)
ALP BLD-CCNC: 107 U/L (ref 35–125)
ALT SERPL-CCNC: 8 U/L (ref 5–40)
ANION GAP SERPL CALC-SCNC: 15 MMOL/L
AST SERPL-CCNC: 16 U/L (ref 5–40)
BASOPHILS # BLD AUTO: 0.01 X10*3/UL (ref 0–0.1)
BASOPHILS NFR BLD AUTO: 0.2 %
BILIRUB SERPL-MCNC: 0.3 MG/DL (ref 0.1–1.2)
BUN SERPL-MCNC: 14 MG/DL (ref 8–25)
CALCIUM SERPL-MCNC: 9.9 MG/DL (ref 8.5–10.4)
CHLORIDE SERPL-SCNC: 101 MMOL/L (ref 97–107)
CHOLEST SERPL-MCNC: 225 MG/DL (ref 133–200)
CHOLEST/HDLC SERPL: 3.3 {RATIO}
CO2 SERPL-SCNC: 23 MMOL/L (ref 24–31)
CREAT SERPL-MCNC: 0.7 MG/DL (ref 0.4–1.6)
EGFRCR SERPLBLD CKD-EPI 2021: 86 ML/MIN/1.73M*2
EOSINOPHIL # BLD AUTO: 0.06 X10*3/UL (ref 0–0.4)
EOSINOPHIL NFR BLD AUTO: 1 %
ERYTHROCYTE [DISTWIDTH] IN BLOOD BY AUTOMATED COUNT: 15.6 % (ref 11.5–14.5)
GLUCOSE SERPL-MCNC: 104 MG/DL (ref 65–99)
HCT VFR BLD AUTO: 42.1 % (ref 36–46)
HDLC SERPL-MCNC: 69 MG/DL
HGB BLD-MCNC: 13.4 G/DL (ref 12–16)
IMM GRANULOCYTES # BLD AUTO: 0.01 X10*3/UL (ref 0–0.5)
IMM GRANULOCYTES NFR BLD AUTO: 0.2 % (ref 0–0.9)
LDLC SERPL CALC-MCNC: 133 MG/DL (ref 65–130)
LYMPHOCYTES # BLD AUTO: 3.13 X10*3/UL (ref 0.8–3)
LYMPHOCYTES NFR BLD AUTO: 50.8 %
MCH RBC QN AUTO: 26.9 PG (ref 26–34)
MCHC RBC AUTO-ENTMCNC: 31.8 G/DL (ref 32–36)
MCV RBC AUTO: 85 FL (ref 80–100)
MONOCYTES # BLD AUTO: 0.41 X10*3/UL (ref 0.05–0.8)
MONOCYTES NFR BLD AUTO: 6.7 %
NEUTROPHILS # BLD AUTO: 2.54 X10*3/UL (ref 1.6–5.5)
NEUTROPHILS NFR BLD AUTO: 41.1 %
NRBC BLD-RTO: 0 /100 WBCS (ref 0–0)
PLATELET # BLD AUTO: 313 X10*3/UL (ref 150–450)
POC APPEARANCE, URINE: CLEAR
POC BILIRUBIN, URINE: NEGATIVE
POC BLOOD, URINE: ABNORMAL
POC COLOR, URINE: YELLOW
POC GLUCOSE, URINE: NEGATIVE MG/DL
POC KETONES, URINE: NEGATIVE MG/DL
POC LEUKOCYTES, URINE: ABNORMAL
POC NITRITE,URINE: NEGATIVE
POC PH, URINE: 5.5 PH
POC PROTEIN, URINE: ABNORMAL MG/DL
POC SPECIFIC GRAVITY, URINE: 1.01
POC UROBILINOGEN, URINE: 1 EU/DL
POTASSIUM SERPL-SCNC: 4.1 MMOL/L (ref 3.4–5.1)
PROT SERPL-MCNC: 7.5 G/DL (ref 5.9–7.9)
RBC # BLD AUTO: 4.98 X10*6/UL (ref 4–5.2)
SODIUM SERPL-SCNC: 139 MMOL/L (ref 133–145)
TRIGL SERPL-MCNC: 115 MG/DL (ref 40–150)
TSH SERPL DL<=0.05 MIU/L-ACNC: 2.06 MIU/L (ref 0.27–4.2)
WBC # BLD AUTO: 6.2 X10*3/UL (ref 4.4–11.3)

## 2024-02-05 PROCEDURE — 84443 ASSAY THYROID STIM HORMONE: CPT | Performed by: NURSE PRACTITIONER

## 2024-02-05 PROCEDURE — 84075 ASSAY ALKALINE PHOSPHATASE: CPT | Performed by: NURSE PRACTITIONER

## 2024-02-05 PROCEDURE — 85025 COMPLETE CBC W/AUTO DIFF WBC: CPT | Performed by: NURSE PRACTITIONER

## 2024-02-05 PROCEDURE — 83718 ASSAY OF LIPOPROTEIN: CPT | Performed by: NURSE PRACTITIONER

## 2024-02-05 PROCEDURE — 1159F MED LIST DOCD IN RCRD: CPT | Performed by: NURSE PRACTITIONER

## 2024-02-05 PROCEDURE — 3078F DIAST BP <80 MM HG: CPT | Performed by: NURSE PRACTITIONER

## 2024-02-05 PROCEDURE — RXMED WILLOW AMBULATORY MEDICATION CHARGE

## 2024-02-05 PROCEDURE — 3074F SYST BP LT 130 MM HG: CPT | Performed by: NURSE PRACTITIONER

## 2024-02-05 PROCEDURE — 1125F AMNT PAIN NOTED PAIN PRSNT: CPT | Performed by: NURSE PRACTITIONER

## 2024-02-05 PROCEDURE — 99214 OFFICE O/P EST MOD 30 MIN: CPT | Performed by: NURSE PRACTITIONER

## 2024-02-05 PROCEDURE — 82306 VITAMIN D 25 HYDROXY: CPT | Performed by: NURSE PRACTITIONER

## 2024-02-05 PROCEDURE — 81003 URINALYSIS AUTO W/O SCOPE: CPT | Performed by: NURSE PRACTITIONER

## 2024-02-05 PROCEDURE — 87086 URINE CULTURE/COLONY COUNT: CPT | Mod: WESLAB | Performed by: NURSE PRACTITIONER

## 2024-02-05 PROCEDURE — 36415 COLL VENOUS BLD VENIPUNCTURE: CPT | Performed by: NURSE PRACTITIONER

## 2024-02-05 PROCEDURE — 1036F TOBACCO NON-USER: CPT | Performed by: NURSE PRACTITIONER

## 2024-02-05 RX ORDER — NITROFURANTOIN 25; 75 MG/1; MG/1
100 CAPSULE ORAL 2 TIMES DAILY
Qty: 14 CAPSULE | Refills: 0 | Status: SHIPPED | OUTPATIENT
Start: 2024-02-05 | End: 2024-02-21 | Stop reason: HOSPADM

## 2024-02-05 RX ORDER — LORATADINE 10 MG/1
10 TABLET ORAL DAILY
Qty: 30 TABLET | Refills: 11 | Status: SHIPPED | OUTPATIENT
Start: 2024-02-05 | End: 2025-02-04

## 2024-02-05 RX ORDER — DOCUSATE SODIUM 100 MG/1
100 CAPSULE, LIQUID FILLED ORAL EVERY 12 HOURS
COMMUNITY
Start: 2023-10-23

## 2024-02-05 RX ORDER — ASPIRIN 81 MG/1
81 TABLET ORAL DAILY
COMMUNITY
End: 2024-02-21 | Stop reason: HOSPADM

## 2024-02-05 ASSESSMENT — ENCOUNTER SYMPTOMS
UNEXPECTED WEIGHT CHANGE: 1
ARTHRALGIAS: 1

## 2024-02-05 ASSESSMENT — PATIENT HEALTH QUESTIONNAIRE - PHQ9
1. LITTLE INTEREST OR PLEASURE IN DOING THINGS: NOT AT ALL
2. FEELING DOWN, DEPRESSED OR HOPELESS: NOT AT ALL
SUM OF ALL RESPONSES TO PHQ9 QUESTIONS 1 AND 2: 0

## 2024-02-05 ASSESSMENT — PAIN SCALES - GENERAL: PAINLEVEL: 9

## 2024-02-05 NOTE — PATIENT INSTRUCTIONS
Looks great, needs pre op clearance from cardiology. Advise rehab again after surgerywill call son when urine culture returns

## 2024-02-05 NOTE — PROGRESS NOTES
Subjective   Patient ID: Alexandria Rai is a 83 y.o. female who presents for Pre-op Exam (Pt here for presurgical clearance on right leg 2/19/24 with Dr Talley).    Here for surgical oziel. Dr talley doing right knee.  Feb 19 th.has had left knee done and while in hospital med's were changed. Current med's updated. Saw dr Sanches in rehab. Med's changes.for her blood pressure readings. Has not seen cardiology for any follow up. Lives with son and family. Went to rehab after surgery,         Review of Systems   Constitutional:  Positive for unexpected weight change.   Musculoskeletal:  Positive for arthralgias.       Objective   /78   Pulse 94   Temp 36.3 °C (97.3 °F)   Resp 18   Ht 1.524 m (5')   Wt 69.2 kg (152 lb 9.6 oz)   SpO2 96%   BMI 29.80 kg/m²     Physical Exam  Constitutional:       Appearance: Normal appearance.   HENT:      Right Ear: Tympanic membrane normal.      Left Ear: Tympanic membrane normal.      Nose: Nose normal.      Mouth/Throat:      Mouth: Mucous membranes are moist.      Pharynx: Oropharynx is clear.   Eyes:      Extraocular Movements: Extraocular movements intact.      Pupils: Pupils are equal, round, and reactive to light.   Cardiovascular:      Rate and Rhythm: Normal rate and regular rhythm.      Heart sounds: Normal heart sounds.   Pulmonary:      Effort: Pulmonary effort is normal.   Abdominal:      General: Bowel sounds are normal.      Palpations: Abdomen is soft.   Musculoskeletal:         General: Swelling and tenderness present.      Cervical back: Neck supple.      Comments: R knee pain   Skin:     Comments: Scar from left knee surgery healed well   Neurological:      Mental Status: She is alert and oriented to person, place, and time.   Psychiatric:         Behavior: Behavior normal.         Assessment/Plan   Problem List Items Addressed This Visit             ICD-10-CM    Vitamin D deficiency E55.9    Relevant Orders    Vitamin D 25-Hydroxy,Total (for eval of  Vitamin D levels)    Primary hypertension - Primary I10    Relevant Orders    Referral to Cardiology     Other Visit Diagnoses         Codes    Allergic rhinitis, unspecified seasonality, unspecified trigger     J30.9    Relevant Medications    loratadine (Claritin) 10 mg tablet    Screening for lipid disorders     Z13.220    Relevant Orders    Lipid Panel    Preop examination     Z01.818    Relevant Orders    CBC and Auto Differential    Comprehensive Metabolic Panel    POCT UA Automated manually resulted    Other fatigue     R53.83    Relevant Orders    TSH with reflex to Free T4 if abnormal

## 2024-02-06 ENCOUNTER — TREATMENT (OUTPATIENT)
Dept: PHYSICAL THERAPY | Facility: CLINIC | Age: 84
End: 2024-02-06
Payer: COMMERCIAL

## 2024-02-06 DIAGNOSIS — R26.9 ABNORMAL GAIT: ICD-10-CM

## 2024-02-06 DIAGNOSIS — M25.562 CHRONIC PAIN OF LEFT KNEE: Chronic | ICD-10-CM

## 2024-02-06 DIAGNOSIS — G89.29 CHRONIC PAIN OF BOTH KNEES: Primary | ICD-10-CM

## 2024-02-06 DIAGNOSIS — Z96.652 S/P TOTAL KNEE ARTHROPLASTY, LEFT: ICD-10-CM

## 2024-02-06 DIAGNOSIS — G89.29 CHRONIC PAIN OF LEFT KNEE: Chronic | ICD-10-CM

## 2024-02-06 DIAGNOSIS — M25.562 CHRONIC PAIN OF BOTH KNEES: Primary | ICD-10-CM

## 2024-02-06 DIAGNOSIS — M25.561 CHRONIC PAIN OF BOTH KNEES: Primary | ICD-10-CM

## 2024-02-06 LAB — BACTERIA UR CULT: ABNORMAL

## 2024-02-06 PROCEDURE — 97110 THERAPEUTIC EXERCISES: CPT | Mod: GP,CQ

## 2024-02-06 ASSESSMENT — PAIN SCALES - GENERAL: PAINLEVEL_OUTOF10: 7

## 2024-02-06 ASSESSMENT — PAIN - FUNCTIONAL ASSESSMENT: PAIN_FUNCTIONAL_ASSESSMENT: 0-10

## 2024-02-06 NOTE — PROGRESS NOTES
"Physical Therapy Treatment    Patient Name: Alexandria Rai  MRN: [id number]  PT Received On: 02/06/24  Time Calculation  Start Time: 0345  Stop Time: 0415  Time Calculation (min): 30 min  PT Therapeutic Procedures Time Entry  Therapeutic Exercise Time Entry: 15  Visit Number: Eval +10 of 23  Visits/Dates Authorized: Current Problem: 23 visits (69 units) by 3/13/24 ther-ex, gait, NMR, estim    1. Chronic pain of both knees        2. S/P total knee arthroplasty, left  Follow Up In Physical Therapy      3. Chronic pain of left knee  Follow Up In Physical Therapy      4. Abnormal gait  Follow Up In Physical Therapy              Surgery: L TKA   Surgery date: 10/23/23    Precautions:    R knee severe arthritis and pain - Feb 19th surgery for right knee  Right knee pain preventing patient from doing therex   Subjective   General:   Right knee so sore today, did estim to calm down so patient could tolerate therex on left     Pre-Treatment Symptoms:   Pain Assessment: 0-10  Pain Score: 7    Objective   Findings: 122 heel slide  0 degrees extension  Intervals of pt sitting briefly due to pain R knee    Treatments:      Therapeutic Exercise:   NuStep L4 8 min   LAQ 0-2-5# 3x10 R/L  Saq and leg culr      DNP:  Tball DKTC x25   Tball bridge 2x10    SAQ #4 on L / BW R 2x10  HL hip iso 10x5\" hld   Backward  walking counter top x 4  Ham curl Blue with 6 inch roll  15x (35# 2x10 DNP)  SAQ off machine 2# 2x15 L,, 2# 4# 15 x R  Seated hip abd/add 15 x  Seated tloop hip flex 2x10 grn  or with #2 wgts  Bridges  SLR 10  Tloop hip abd/ext x10    Step tap 5 inches  TKE band R/L yellow to fatigue  Seated HS curl orange  Quad sets with 0 ext  HR/TR's     Neuromuscular Re-Ed: DNP  6\" hurdles step to FW/lat x3ea  Foam WBOS EO, EC, vert and horiz head turns  Attempted foam marches - too much on RLE    Therapeutic Activity:    Gait Training: DNP  Instructed use of cane L hand for unloading R knee due to greater R knee pain at this time then " post-op pain.    Manual Therapy: DNP  Manual: STM, patellar mobility, scar massage, overpressure into flexion     Modalities:    MHP with IFC estim to right knee prior to session  supine over wedge    Assessment:   Modified session due to severe right knee pain.  Did better after estim and was able to participate for a few therex  Post-Treatment Symptoms:   Response to Interventions: right knee is so painful today   did well with estim to start session    Plan:    Advance to ability within limits of R knee pain.    Goals:   Resolved       PT Problem       Knee goals (Met)       Start:  12/13/23    Expected End:  02/21/24    Resolved:  02/06/24    1) Pt will report pain levels no greater than 0/10 at worst with activity for less difficulty with active knee movement, standing, walking, transferring, and negotiating stairs.  2) Pt will display pain-free left knee AROM WFL for less difficulty walking, squatting, transferring, performing bed mobility, and negotiating stairs.  3) Pt will display pain-free hip and knee MMT of at least 5/5 for all major muscle groups for improved functional strength with walking, transferring, squatting, and negotiating stairs.  4) Pt will display symmetrical pain-free gait without AD and without compensation.  5) Pt will improve Oxford Knee Score to <10% impairment to indicate significant improvement in knee function.

## 2024-02-14 ENCOUNTER — PHARMACY VISIT (OUTPATIENT)
Dept: PHARMACY | Facility: CLINIC | Age: 84
End: 2024-02-14
Payer: COMMERCIAL

## 2024-02-14 ENCOUNTER — OFFICE VISIT (OUTPATIENT)
Dept: CARDIOLOGY | Facility: CLINIC | Age: 84
End: 2024-02-14
Payer: COMMERCIAL

## 2024-02-14 VITALS
HEIGHT: 60 IN | WEIGHT: 156.2 LBS | TEMPERATURE: 98.9 F | RESPIRATION RATE: 18 BRPM | BODY MASS INDEX: 30.67 KG/M2 | OXYGEN SATURATION: 96 % | HEART RATE: 72 BPM | DIASTOLIC BLOOD PRESSURE: 77 MMHG | SYSTOLIC BLOOD PRESSURE: 136 MMHG

## 2024-02-14 DIAGNOSIS — I10 PRIMARY HYPERTENSION: ICD-10-CM

## 2024-02-14 DIAGNOSIS — Z01.810 ENCOUNTER FOR PRE-OPERATIVE CARDIOVASCULAR CLEARANCE: Primary | ICD-10-CM

## 2024-02-14 PROCEDURE — RXMED WILLOW AMBULATORY MEDICATION CHARGE

## 2024-02-14 PROCEDURE — 1036F TOBACCO NON-USER: CPT | Performed by: INTERNAL MEDICINE

## 2024-02-14 PROCEDURE — 3078F DIAST BP <80 MM HG: CPT | Performed by: INTERNAL MEDICINE

## 2024-02-14 PROCEDURE — 3075F SYST BP GE 130 - 139MM HG: CPT | Performed by: INTERNAL MEDICINE

## 2024-02-14 PROCEDURE — 1159F MED LIST DOCD IN RCRD: CPT | Performed by: INTERNAL MEDICINE

## 2024-02-14 PROCEDURE — 99214 OFFICE O/P EST MOD 30 MIN: CPT | Performed by: INTERNAL MEDICINE

## 2024-02-14 PROCEDURE — 93000 ELECTROCARDIOGRAM COMPLETE: CPT | Performed by: INTERNAL MEDICINE

## 2024-02-14 PROCEDURE — 1125F AMNT PAIN NOTED PAIN PRSNT: CPT | Performed by: INTERNAL MEDICINE

## 2024-02-14 RX ORDER — ATENOLOL AND CHLORTHALIDONE TABLET 50; 25 MG/1; MG/1
0.5 TABLET ORAL DAILY
Qty: 45 TABLET | Refills: 3 | Status: SHIPPED | OUTPATIENT
Start: 2024-02-14 | End: 2025-02-13

## 2024-02-14 RX ORDER — CHLORHEXIDINE GLUCONATE 4 G/100ML
1 SOLUTION TOPICAL DAILY PRN
COMMUNITY
Start: 2024-02-09 | End: 2024-02-21 | Stop reason: HOSPADM

## 2024-02-14 ASSESSMENT — PATIENT HEALTH QUESTIONNAIRE - PHQ9
1. LITTLE INTEREST OR PLEASURE IN DOING THINGS: NOT AT ALL
SUM OF ALL RESPONSES TO PHQ9 QUESTIONS 1 AND 2: 0
2. FEELING DOWN, DEPRESSED OR HOPELESS: NOT AT ALL

## 2024-02-14 ASSESSMENT — PAIN SCALES - GENERAL: PAINLEVEL: 8

## 2024-02-14 NOTE — LETTER
February 14, 2024     Declan Prasad MD  [address] [place] [address]  [address]  [place] [address]    Patient: Alexandria Rai   YOB: 1940   Date of Visit: 2/14/2024       Dear Dr. Declan Prasad MD:    Thank you for referring Alexandria Rai to me for evaluation. Below are my notes for this consultation.  If you have questions, please do not hesitate to call me. I look forward to following your patient along with you.  Patient stable cardiac wise, continue beta-blocker or rate control medication.  Moderate risk for CV events during the surgery.  No contraindication for surgery.  Hold blood thinner 48 hours before surgery.       Sincerely,     Daniel Sanches MD      CC: Vaishali Cunha, APRLAMAR-CNP  ______________________________________________________________________________________    Subjective  Chief Complaint   Patient presents with   • Pre-op Clearance     Mrs\ Ms. Rai is requiring a surgical release to have a procedure done on 02.19.24 provider needed her to come in for cardiac clearance         83-year-old female patient with a history of hypertension hyperlipidemia.  Currently on aspirin, diltiazem, Claritin.  Patient has seen primary care physician for preop clearance in past.  Patient had elective left TKA in October last year.  Currently on multiple blood pressure medication.  Wheelchair-bound.  Patient has upcoming surgery with the right knee on this Monday.  No active chest pain tightness.  Episodes of leg swelling.  Discussed with the son at the bedside.    Past Medical History:   Diagnosis Date   • Arthritis     DJD, sciatica   • Hyperlipidemia    • Hypertension    • Joint pain      Past Surgical History:   Procedure Laterality Date   • CATARACT EXTRACTION     • VAGINAL PROLAPSE REPAIR       No relevant family history has been documented for this patient.  Current Outpatient Medications   Medication Sig Dispense Refill   • aspirin 81 mg EC tablet Take 1 tablet (81 mg) by mouth once daily.     •  dilTIAZem CD (Cardizem CD) 120 mg 24 hr capsule Take 1 capsule (120 mg) by mouth 2 times a day. HOLD IF HEART RATE IS LESS THAN 50 BEATS PER MINUTE 180 capsule 3   • docusate sodium (Colace) 100 mg capsule Take 1 capsule (100 mg) by mouth every 12 hours.     • Neha-Hex 4 % external liquid Apply 1 Application topically once daily as needed for wound care. APPLY IN SHOWER ONCE A DAY THE DAY BEFORE SURGERY     • ergocalciferol (Vitamin D-2) 1.25 MG (20541 UT) capsule Take 1 capsule (50,000 Units) by mouth 1 (one) time per week. 12 capsule 2   • loratadine (Claritin) 10 mg tablet Take 1 tablet (10 mg) by mouth once daily. 30 tablet 11     No current facility-administered medications for this visit.      reports that she has never smoked. She has never been exposed to tobacco smoke. She has never used smokeless tobacco. She reports that she does not drink alcohol and does not use drugs.  Patient has no known allergies.  Encounter for pre-operative cardiovascular clearance    Vitals:    02/14/24 1013   BP: 136/77   Pulse: 72   Resp: 18   Temp: 37.2 °C (98.9 °F)   TempSrc: Core   SpO2: 96%   Weight: 70.9 kg (156 lb 3.2 oz)   Height: 1.524 m (5')   PainSc:   8   PainLoc: Leg  Comment: right lle, 2- for left lle      BMI:Body mass index is 30.51 kg/m².   General Cardiology:  General Appearance: Alert, oriented and in no acute distress.  HEENT: extra ocular movements intact (EOMI), pupils equal,  round, reactive to light and accommodation (PERRLA).  Carotid Upstroke: no bruit, normal.  Jugular Venous Distention (JVD): flat.  Chest: normal.  Lungs: Clear to auscultation,   Heart Sounds: no S3 or S4, normal S1, S2, regular rate.  Murmur, Click, Gallop: no systolic murmur.  Abdomen: no hepatomegaly, no masses felt, soft.  Extremities: Mild 1+ leg edema.  Left worse than right  Peripheral pulses: 2 plus bilateral.  NEUROLOGY Cranial nerves II-XII grossly intact.     Patient Active Problem List   Diagnosis   • Abnormal gait   •  Degenerative disc disease at L5-S1 level   • Disorder of bone   • Elevated LDL cholesterol level   • Hyperlipidemia   • Primary osteoarthritis of both knees   • Syrinx (CMS/HCC)   • Vitamin D deficiency   • Sciatica   • S/P total knee arthroplasty, left   • Osteoarthritis   • Primary hypertension   • Drug-induced constipation   • Chronic pain of both knees   • Chronic left shoulder pain   • Displacement of lumbar intervertebral disc with radiculopathy   • Localized, primary osteoarthritis of shoulder region, right   • Spinal stenosis, lumbar region, with neurogenic claudication   • Encounter for pre-operative cardiovascular clearance       Problem List Items Addressed This Visit          Cardiac and Vasculature    Primary hypertension    Encounter for pre-operative cardiovascular clearance - Primary      83-year-old female patient with a history of osteoarthritis.  Status post total knee arthroplasty left in October last year tolerated procedure fairly well.  Patient currently on Cardizem CD1 20 mg p.o. twice a day for rate control for blood pressure.  Patient does admit to having on and off leg edema left worse than right now with upcoming surgery for right knee.  No active chest pain tightness.  Discussed with the son at the bedside.  EKG showed underlying sinus rhythm 82 with occasional PVC with a nonspecific ST-T changes seen.  1.  Preop clearance from cardiac perspective: Patient is stable from cardiac wise.  No contraindication for surgery.  Moderate risk for surgery.  Hold aspirin at least 2 days before surgery.  With underlying leg edema and pulse 82 might add low-dose of beta-blocker with diuretics.  Add patient on Tenoretic 50/25 mg half tablet p.o. daily.  2.  Hypertension: Continue current calcium channel blocker add low-dose of beta-blocker with diuretics.  Modify risk factor. Advised patient to avoid lunch meats, canned soups, pizzas, bread rolls, and sandwiches. Advised patient to limit salt intake  1,500 mg daily.   Patient stable cardiac wise, continue beta-blocker or rate control medication.  Moderate risk for CV events during the surgery.  No contraindication for surgery.  Hold blood thinner 48 hours before surgery.      Daniel Sanches MD

## 2024-02-14 NOTE — H&P (VIEW-ONLY)
Subjective   Chief Complaint   Patient presents with    Pre-op Clearance     Mrs\ Ms. Rai is requiring a surgical release to have a procedure done on 02.19.24 provider needed her to come in for cardiac clearance         83-year-old female patient with a history of hypertension hyperlipidemia.  Currently on aspirin, diltiazem, Claritin.  Patient has seen primary care physician for preop clearance in past.  Patient had elective left TKA in October last year.  Currently on multiple blood pressure medication.  Wheelchair-bound.  Patient has upcoming surgery with the right knee on this Monday.  No active chest pain tightness.  Episodes of leg swelling.  Discussed with the son at the bedside.    Past Medical History:   Diagnosis Date    Arthritis     DJD, sciatica    Hyperlipidemia     Hypertension     Joint pain      Past Surgical History:   Procedure Laterality Date    CATARACT EXTRACTION      VAGINAL PROLAPSE REPAIR       No relevant family history has been documented for this patient.  Current Outpatient Medications   Medication Sig Dispense Refill    aspirin 81 mg EC tablet Take 1 tablet (81 mg) by mouth once daily.      dilTIAZem CD (Cardizem CD) 120 mg 24 hr capsule Take 1 capsule (120 mg) by mouth 2 times a day. HOLD IF HEART RATE IS LESS THAN 50 BEATS PER MINUTE 180 capsule 3    docusate sodium (Colace) 100 mg capsule Take 1 capsule (100 mg) by mouth every 12 hours.      Neha-Hex 4 % external liquid Apply 1 Application topically once daily as needed for wound care. APPLY IN SHOWER ONCE A DAY THE DAY BEFORE SURGERY      ergocalciferol (Vitamin D-2) 1.25 MG (44567 UT) capsule Take 1 capsule (50,000 Units) by mouth 1 (one) time per week. 12 capsule 2    loratadine (Claritin) 10 mg tablet Take 1 tablet (10 mg) by mouth once daily. 30 tablet 11     No current facility-administered medications for this visit.      reports that she has never smoked. She has never been exposed to tobacco smoke. She has never used  smokeless tobacco. She reports that she does not drink alcohol and does not use drugs.  Patient has no known allergies.  Encounter for pre-operative cardiovascular clearance    Vitals:    02/14/24 1013   BP: 136/77   Pulse: 72   Resp: 18   Temp: 37.2 °C (98.9 °F)   TempSrc: Core   SpO2: 96%   Weight: 70.9 kg (156 lb 3.2 oz)   Height: 1.524 m (5')   PainSc:   8   PainLoc: Leg  Comment: right lle, 2- for left lle      BMI:Body mass index is 30.51 kg/m².   General Cardiology:  General Appearance: Alert, oriented and in no acute distress.  HEENT: extra ocular movements intact (EOMI), pupils equal,  round, reactive to light and accommodation (PERRLA).  Carotid Upstroke: no bruit, normal.  Jugular Venous Distention (JVD): flat.  Chest: normal.  Lungs: Clear to auscultation,   Heart Sounds: no S3 or S4, normal S1, S2, regular rate.  Murmur, Click, Gallop: no systolic murmur.  Abdomen: no hepatomegaly, no masses felt, soft.  Extremities: Mild 1+ leg edema.  Left worse than right  Peripheral pulses: 2 plus bilateral.  NEUROLOGY Cranial nerves II-XII grossly intact.     Patient Active Problem List   Diagnosis    Abnormal gait    Degenerative disc disease at L5-S1 level    Disorder of bone    Elevated LDL cholesterol level    Hyperlipidemia    Primary osteoarthritis of both knees    Syrinx (CMS/HCC)    Vitamin D deficiency    Sciatica    S/P total knee arthroplasty, left    Osteoarthritis    Primary hypertension    Drug-induced constipation    Chronic pain of both knees    Chronic left shoulder pain    Displacement of lumbar intervertebral disc with radiculopathy    Localized, primary osteoarthritis of shoulder region, right    Spinal stenosis, lumbar region, with neurogenic claudication    Encounter for pre-operative cardiovascular clearance       Problem List Items Addressed This Visit          Cardiac and Vasculature    Primary hypertension    Encounter for pre-operative cardiovascular clearance - Primary      83-year-old  female patient with a history of osteoarthritis.  Status post total knee arthroplasty left in October last year tolerated procedure fairly well.  Patient currently on Cardizem CD1 20 mg p.o. twice a day for rate control for blood pressure.  Patient does admit to having on and off leg edema left worse than right now with upcoming surgery for right knee.  No active chest pain tightness.  Discussed with the son at the bedside.  EKG showed underlying sinus rhythm 82 with occasional PVC with a nonspecific ST-T changes seen.  1.  Preop clearance from cardiac perspective: Patient is stable from cardiac wise.  No contraindication for surgery.  Moderate risk for surgery.  Hold aspirin at least 2 days before surgery.  With underlying leg edema and pulse 82 might add low-dose of beta-blocker with diuretics.  Add patient on Tenoretic 50/25 mg half tablet p.o. daily.  2.  Hypertension: Continue current calcium channel blocker add low-dose of beta-blocker with diuretics.  Modify risk factor. Advised patient to avoid lunch meats, canned soups, pizzas, bread rolls, and sandwiches. Advised patient to limit salt intake 1,500 mg daily.   Patient stable cardiac wise, continue beta-blocker or rate control medication.  Moderate risk for CV events during the surgery.  No contraindication for surgery.  Hold blood thinner 48 hours before surgery.      Daniel Sanches MD

## 2024-02-14 NOTE — PROGRESS NOTES
Subjective   Chief Complaint   Patient presents with    Pre-op Clearance     Mrs\ Ms. Rai is requiring a surgical release to have a procedure done on 02.19.24 provider needed her to come in for cardiac clearance         83-year-old female patient with a history of hypertension hyperlipidemia.  Currently on aspirin, diltiazem, Claritin.  Patient has seen primary care physician for preop clearance in past.  Patient had elective left TKA in October last year.  Currently on multiple blood pressure medication.  Wheelchair-bound.  Patient has upcoming surgery with the right knee on this Monday.  No active chest pain tightness.  Episodes of leg swelling.  Discussed with the son at the bedside.    Past Medical History:   Diagnosis Date    Arthritis     DJD, sciatica    Hyperlipidemia     Hypertension     Joint pain      Past Surgical History:   Procedure Laterality Date    CATARACT EXTRACTION      VAGINAL PROLAPSE REPAIR       No relevant family history has been documented for this patient.  Current Outpatient Medications   Medication Sig Dispense Refill    aspirin 81 mg EC tablet Take 1 tablet (81 mg) by mouth once daily.      dilTIAZem CD (Cardizem CD) 120 mg 24 hr capsule Take 1 capsule (120 mg) by mouth 2 times a day. HOLD IF HEART RATE IS LESS THAN 50 BEATS PER MINUTE 180 capsule 3    docusate sodium (Colace) 100 mg capsule Take 1 capsule (100 mg) by mouth every 12 hours.      Neha-Hex 4 % external liquid Apply 1 Application topically once daily as needed for wound care. APPLY IN SHOWER ONCE A DAY THE DAY BEFORE SURGERY      ergocalciferol (Vitamin D-2) 1.25 MG (88402 UT) capsule Take 1 capsule (50,000 Units) by mouth 1 (one) time per week. 12 capsule 2    loratadine (Claritin) 10 mg tablet Take 1 tablet (10 mg) by mouth once daily. 30 tablet 11     No current facility-administered medications for this visit.      reports that she has never smoked. She has never been exposed to tobacco smoke. She has never used  smokeless tobacco. She reports that she does not drink alcohol and does not use drugs.  Patient has no known allergies.  Encounter for pre-operative cardiovascular clearance    Vitals:    02/14/24 1013   BP: 136/77   Pulse: 72   Resp: 18   Temp: 37.2 °C (98.9 °F)   TempSrc: Core   SpO2: 96%   Weight: 70.9 kg (156 lb 3.2 oz)   Height: 1.524 m (5')   PainSc:   8   PainLoc: Leg  Comment: right lle, 2- for left lle      BMI:Body mass index is 30.51 kg/m².   General Cardiology:  General Appearance: Alert, oriented and in no acute distress.  HEENT: extra ocular movements intact (EOMI), pupils equal,  round, reactive to light and accommodation (PERRLA).  Carotid Upstroke: no bruit, normal.  Jugular Venous Distention (JVD): flat.  Chest: normal.  Lungs: Clear to auscultation,   Heart Sounds: no S3 or S4, normal S1, S2, regular rate.  Murmur, Click, Gallop: no systolic murmur.  Abdomen: no hepatomegaly, no masses felt, soft.  Extremities: Mild 1+ leg edema.  Left worse than right  Peripheral pulses: 2 plus bilateral.  NEUROLOGY Cranial nerves II-XII grossly intact.     Patient Active Problem List   Diagnosis    Abnormal gait    Degenerative disc disease at L5-S1 level    Disorder of bone    Elevated LDL cholesterol level    Hyperlipidemia    Primary osteoarthritis of both knees    Syrinx (CMS/HCC)    Vitamin D deficiency    Sciatica    S/P total knee arthroplasty, left    Osteoarthritis    Primary hypertension    Drug-induced constipation    Chronic pain of both knees    Chronic left shoulder pain    Displacement of lumbar intervertebral disc with radiculopathy    Localized, primary osteoarthritis of shoulder region, right    Spinal stenosis, lumbar region, with neurogenic claudication    Encounter for pre-operative cardiovascular clearance       Problem List Items Addressed This Visit          Cardiac and Vasculature    Primary hypertension    Encounter for pre-operative cardiovascular clearance - Primary      83-year-old  female patient with a history of osteoarthritis.  Status post total knee arthroplasty left in October last year tolerated procedure fairly well.  Patient currently on Cardizem CD1 20 mg p.o. twice a day for rate control for blood pressure.  Patient does admit to having on and off leg edema left worse than right now with upcoming surgery for right knee.  No active chest pain tightness.  Discussed with the son at the bedside.  EKG showed underlying sinus rhythm 82 with occasional PVC with a nonspecific ST-T changes seen.  1.  Preop clearance from cardiac perspective: Patient is stable from cardiac wise.  No contraindication for surgery.  Moderate risk for surgery.  Hold aspirin at least 2 days before surgery.  With underlying leg edema and pulse 82 might add low-dose of beta-blocker with diuretics.  Add patient on Tenoretic 50/25 mg half tablet p.o. daily.  2.  Hypertension: Continue current calcium channel blocker add low-dose of beta-blocker with diuretics.  Modify risk factor. Advised patient to avoid lunch meats, canned soups, pizzas, bread rolls, and sandwiches. Advised patient to limit salt intake 1,500 mg daily.   Patient stable cardiac wise, continue beta-blocker or rate control medication.  Moderate risk for CV events during the surgery.  No contraindication for surgery.  Hold blood thinner 48 hours before surgery.      Daniel Sanches MD

## 2024-02-18 ENCOUNTER — ANESTHESIA EVENT (OUTPATIENT)
Dept: OPERATING ROOM | Facility: HOSPITAL | Age: 84
End: 2024-02-18
Payer: COMMERCIAL

## 2024-02-18 PROBLEM — Z96.651 S/P TOTAL KNEE ARTHROPLASTY, RIGHT: Status: ACTIVE | Noted: 2024-02-18

## 2024-02-18 PROBLEM — Z96.651 S/P TOTAL KNEE ARTHROPLASTY, RIGHT: Status: RESOLVED | Noted: 2024-02-18 | Resolved: 2024-02-18

## 2024-02-19 ENCOUNTER — ANESTHESIA (OUTPATIENT)
Dept: OPERATING ROOM | Facility: HOSPITAL | Age: 84
End: 2024-02-19
Payer: COMMERCIAL

## 2024-02-19 ENCOUNTER — APPOINTMENT (OUTPATIENT)
Dept: RADIOLOGY | Facility: HOSPITAL | Age: 84
End: 2024-02-19
Payer: COMMERCIAL

## 2024-02-19 ENCOUNTER — HOSPITAL ENCOUNTER (OUTPATIENT)
Facility: HOSPITAL | Age: 84
LOS: 1 days | Discharge: NURSING, REHAB OR LONG-TERM CARE | End: 2024-02-21
Attending: STUDENT IN AN ORGANIZED HEALTH CARE EDUCATION/TRAINING PROGRAM | Admitting: STUDENT IN AN ORGANIZED HEALTH CARE EDUCATION/TRAINING PROGRAM
Payer: COMMERCIAL

## 2024-02-19 DIAGNOSIS — Z96.651 S/P TOTAL KNEE ARTHROPLASTY, RIGHT: Primary | ICD-10-CM

## 2024-02-19 PROCEDURE — 2500000005 HC RX 250 GENERAL PHARMACY W/O HCPCS: Performed by: ANESTHESIOLOGY

## 2024-02-19 PROCEDURE — 3700000002 HC GENERAL ANESTHESIA TIME - EACH INCREMENTAL 1 MINUTE: Performed by: STUDENT IN AN ORGANIZED HEALTH CARE EDUCATION/TRAINING PROGRAM

## 2024-02-19 PROCEDURE — 2500000005 HC RX 250 GENERAL PHARMACY W/O HCPCS: Performed by: STUDENT IN AN ORGANIZED HEALTH CARE EDUCATION/TRAINING PROGRAM

## 2024-02-19 PROCEDURE — 2500000004 HC RX 250 GENERAL PHARMACY W/ HCPCS (ALT 636 FOR OP/ED): Performed by: STUDENT IN AN ORGANIZED HEALTH CARE EDUCATION/TRAINING PROGRAM

## 2024-02-19 PROCEDURE — C1776 JOINT DEVICE (IMPLANTABLE): HCPCS | Performed by: STUDENT IN AN ORGANIZED HEALTH CARE EDUCATION/TRAINING PROGRAM

## 2024-02-19 PROCEDURE — 99222 1ST HOSP IP/OBS MODERATE 55: CPT | Performed by: NURSE PRACTITIONER

## 2024-02-19 PROCEDURE — 7100000002 HC RECOVERY ROOM TIME - EACH INCREMENTAL 1 MINUTE: Performed by: STUDENT IN AN ORGANIZED HEALTH CARE EDUCATION/TRAINING PROGRAM

## 2024-02-19 PROCEDURE — C1713 ANCHOR/SCREW BN/BN,TIS/BN: HCPCS | Performed by: STUDENT IN AN ORGANIZED HEALTH CARE EDUCATION/TRAINING PROGRAM

## 2024-02-19 PROCEDURE — 2720000007 HC OR 272 NO HCPCS: Performed by: STUDENT IN AN ORGANIZED HEALTH CARE EDUCATION/TRAINING PROGRAM

## 2024-02-19 PROCEDURE — 7100000001 HC RECOVERY ROOM TIME - INITIAL BASE CHARGE: Performed by: STUDENT IN AN ORGANIZED HEALTH CARE EDUCATION/TRAINING PROGRAM

## 2024-02-19 PROCEDURE — 73560 X-RAY EXAM OF KNEE 1 OR 2: CPT | Mod: RT

## 2024-02-19 PROCEDURE — 2500000001 HC RX 250 WO HCPCS SELF ADMINISTERED DRUGS (ALT 637 FOR MEDICARE OP): Performed by: STUDENT IN AN ORGANIZED HEALTH CARE EDUCATION/TRAINING PROGRAM

## 2024-02-19 PROCEDURE — 3700000001 HC GENERAL ANESTHESIA TIME - INITIAL BASE CHARGE: Performed by: STUDENT IN AN ORGANIZED HEALTH CARE EDUCATION/TRAINING PROGRAM

## 2024-02-19 PROCEDURE — 97161 PT EVAL LOW COMPLEX 20 MIN: CPT | Mod: GP

## 2024-02-19 PROCEDURE — 3600000018 HC OR TIME - INITIAL BASE CHARGE - PROCEDURE LEVEL SIX: Performed by: STUDENT IN AN ORGANIZED HEALTH CARE EDUCATION/TRAINING PROGRAM

## 2024-02-19 PROCEDURE — A4649 SURGICAL SUPPLIES: HCPCS | Performed by: STUDENT IN AN ORGANIZED HEALTH CARE EDUCATION/TRAINING PROGRAM

## 2024-02-19 PROCEDURE — 2780000003 HC OR 278 NO HCPCS: Performed by: STUDENT IN AN ORGANIZED HEALTH CARE EDUCATION/TRAINING PROGRAM

## 2024-02-19 PROCEDURE — 2500000001 HC RX 250 WO HCPCS SELF ADMINISTERED DRUGS (ALT 637 FOR MEDICARE OP): Performed by: NURSE PRACTITIONER

## 2024-02-19 PROCEDURE — 3600000017 HC OR TIME - EACH INCREMENTAL 1 MINUTE - PROCEDURE LEVEL SIX: Performed by: STUDENT IN AN ORGANIZED HEALTH CARE EDUCATION/TRAINING PROGRAM

## 2024-02-19 PROCEDURE — 2500000001 HC RX 250 WO HCPCS SELF ADMINISTERED DRUGS (ALT 637 FOR MEDICARE OP)

## 2024-02-19 PROCEDURE — 2500000004 HC RX 250 GENERAL PHARMACY W/ HCPCS (ALT 636 FOR OP/ED): Performed by: ANESTHESIOLOGY

## 2024-02-19 PROCEDURE — 27447 TOTAL KNEE ARTHROPLASTY: CPT | Performed by: PHYSICIAN ASSISTANT

## 2024-02-19 PROCEDURE — 1100000001 HC PRIVATE ROOM DAILY

## 2024-02-19 PROCEDURE — 97166 OT EVAL MOD COMPLEX 45 MIN: CPT | Mod: GO

## 2024-02-19 DEVICE — IMPLANTABLE DEVICE: Type: IMPLANTABLE DEVICE | Site: KNEE | Status: FUNCTIONAL

## 2024-02-19 DEVICE — BASEPLATE, TIBIA 3 TS TRIATH: Type: IMPLANTABLE DEVICE | Site: KNEE | Status: FUNCTIONAL

## 2024-02-19 DEVICE — SIMPLEX® HV IS A FAST-SETTING ACRYLIC RESIN FOR USE IN BONE SURGERY. MIXING THE TWO SEPARATE STERILE COMPONENTS PRODUCES A DUCTILE BONE CEMENT WHICH, AFTER HARDENING, FIXES THE IMPLANT AND TRANSFERS STRESSES PRODUCED DURING MOVEMENT EVENLY TO THE BONE. SIMPLEX® HV CEMENT POWDER ALSO CONTAINS INSOLUBLE ZIRCONIUM DIOXIDE AS AN X-RAY CONTRAST MEDIUM. SIMPLEX® HV DOES NOT EMIT A SIGNAL AND DOES NOT POSE A SAFETY RISK IN A MAGNETIC RESONANCE ENVIRONMENT.
Type: IMPLANTABLE DEVICE | Site: KNEE | Status: FUNCTIONAL
Brand: SIMPLEX HV

## 2024-02-19 DEVICE — PEG, FEMORAL DISTAL FIXATION: Type: IMPLANTABLE DEVICE | Site: KNEE | Status: FUNCTIONAL

## 2024-02-19 DEVICE — PATELLA, TRIATHLON, ASYMMETRIC X3, SZ-A32 10MM: Type: IMPLANTABLE DEVICE | Site: KNEE | Status: FUNCTIONAL

## 2024-02-19 RX ORDER — ACETAMINOPHEN 325 MG/1
650 TABLET ORAL EVERY 4 HOURS PRN
Status: DISCONTINUED | OUTPATIENT
Start: 2024-02-19 | End: 2024-02-21 | Stop reason: HOSPADM

## 2024-02-19 RX ORDER — LORATADINE 10 MG/1
10 TABLET ORAL DAILY
Status: DISCONTINUED | OUTPATIENT
Start: 2024-02-19 | End: 2024-02-21 | Stop reason: HOSPADM

## 2024-02-19 RX ORDER — ATENOLOL AND CHLORTHALIDONE TABLET 50; 25 MG/1; MG/1
0.5 TABLET ORAL DAILY
Status: DISCONTINUED | OUTPATIENT
Start: 2024-02-19 | End: 2024-02-19

## 2024-02-19 RX ORDER — DILTIAZEM HYDROCHLORIDE 120 MG/1
120 CAPSULE, COATED, EXTENDED RELEASE ORAL 2 TIMES DAILY
Status: DISCONTINUED | OUTPATIENT
Start: 2024-02-19 | End: 2024-02-21

## 2024-02-19 RX ORDER — MIDAZOLAM HYDROCHLORIDE 1 MG/ML
2 INJECTION, SOLUTION INTRAMUSCULAR; INTRAVENOUS ONCE
Status: COMPLETED | OUTPATIENT
Start: 2024-02-19 | End: 2024-02-19

## 2024-02-19 RX ORDER — HYDROCODONE BITARTRATE AND ACETAMINOPHEN 5; 325 MG/1; MG/1
2 TABLET ORAL EVERY 4 HOURS PRN
Status: DISCONTINUED | OUTPATIENT
Start: 2024-02-19 | End: 2024-02-21 | Stop reason: HOSPADM

## 2024-02-19 RX ORDER — DOCUSATE SODIUM 100 MG/1
100 CAPSULE, LIQUID FILLED ORAL EVERY 12 HOURS
Status: DISCONTINUED | OUTPATIENT
Start: 2024-02-19 | End: 2024-02-21 | Stop reason: HOSPADM

## 2024-02-19 RX ORDER — CEFAZOLIN SODIUM 2 G/100ML
2 INJECTION, SOLUTION INTRAVENOUS ONCE
Status: COMPLETED | OUTPATIENT
Start: 2024-02-19 | End: 2024-02-19

## 2024-02-19 RX ORDER — ASPIRIN 81 MG/1
81 TABLET ORAL 2 TIMES DAILY
Status: DISCONTINUED | OUTPATIENT
Start: 2024-02-19 | End: 2024-02-21 | Stop reason: HOSPADM

## 2024-02-19 RX ORDER — TRANEXAMIC ACID 100 MG/ML
INJECTION, SOLUTION INTRAVENOUS AS NEEDED
Status: DISCONTINUED | OUTPATIENT
Start: 2024-02-19 | End: 2024-02-19

## 2024-02-19 RX ORDER — HYDROCODONE BITARTRATE AND ACETAMINOPHEN 5; 325 MG/1; MG/1
1 TABLET ORAL EVERY 4 HOURS PRN
Status: DISCONTINUED | OUTPATIENT
Start: 2024-02-19 | End: 2024-02-21 | Stop reason: HOSPADM

## 2024-02-19 RX ORDER — FENTANYL CITRATE 50 UG/ML
25 INJECTION, SOLUTION INTRAMUSCULAR; INTRAVENOUS EVERY 5 MIN PRN
Status: DISCONTINUED | OUTPATIENT
Start: 2024-02-19 | End: 2024-02-19 | Stop reason: HOSPADM

## 2024-02-19 RX ORDER — VANCOMYCIN HYDROCHLORIDE 1 G/20ML
INJECTION, POWDER, LYOPHILIZED, FOR SOLUTION INTRAVENOUS AS NEEDED
Status: DISCONTINUED | OUTPATIENT
Start: 2024-02-19 | End: 2024-02-19 | Stop reason: HOSPADM

## 2024-02-19 RX ORDER — CHLORTHALIDONE 25 MG/1
12.5 TABLET ORAL DAILY
Status: DISCONTINUED | OUTPATIENT
Start: 2024-02-19 | End: 2024-02-19

## 2024-02-19 RX ORDER — POLYETHYLENE GLYCOL 3350 17 G/17G
17 POWDER, FOR SOLUTION ORAL DAILY
Status: DISCONTINUED | OUTPATIENT
Start: 2024-02-19 | End: 2024-02-21 | Stop reason: HOSPADM

## 2024-02-19 RX ORDER — FENTANYL CITRATE 50 UG/ML
INJECTION, SOLUTION INTRAMUSCULAR; INTRAVENOUS AS NEEDED
Status: DISCONTINUED | OUTPATIENT
Start: 2024-02-19 | End: 2024-02-19

## 2024-02-19 RX ORDER — NALOXONE HYDROCHLORIDE 0.4 MG/ML
0.2 INJECTION, SOLUTION INTRAMUSCULAR; INTRAVENOUS; SUBCUTANEOUS EVERY 5 MIN PRN
Status: DISCONTINUED | OUTPATIENT
Start: 2024-02-19 | End: 2024-02-21 | Stop reason: HOSPADM

## 2024-02-19 RX ORDER — FENTANYL CITRATE 50 UG/ML
50 INJECTION, SOLUTION INTRAMUSCULAR; INTRAVENOUS ONCE
Status: COMPLETED | OUTPATIENT
Start: 2024-02-19 | End: 2024-02-19

## 2024-02-19 RX ORDER — PHENYLEPHRINE HCL IN 0.9% NACL 1 MG/10 ML
SYRINGE (ML) INTRAVENOUS AS NEEDED
Status: DISCONTINUED | OUTPATIENT
Start: 2024-02-19 | End: 2024-02-19

## 2024-02-19 RX ORDER — ONDANSETRON HYDROCHLORIDE 2 MG/ML
INJECTION, SOLUTION INTRAVENOUS AS NEEDED
Status: DISCONTINUED | OUTPATIENT
Start: 2024-02-19 | End: 2024-02-19

## 2024-02-19 RX ORDER — SODIUM CHLORIDE 9 MG/ML
100 INJECTION, SOLUTION INTRAVENOUS CONTINUOUS
Status: ACTIVE | OUTPATIENT
Start: 2024-02-19 | End: 2024-02-20

## 2024-02-19 RX ORDER — SODIUM CHLORIDE, SODIUM LACTATE, POTASSIUM CHLORIDE, CALCIUM CHLORIDE 600; 310; 30; 20 MG/100ML; MG/100ML; MG/100ML; MG/100ML
100 INJECTION, SOLUTION INTRAVENOUS CONTINUOUS
Status: DISCONTINUED | OUTPATIENT
Start: 2024-02-19 | End: 2024-02-21 | Stop reason: HOSPADM

## 2024-02-19 RX ORDER — ONDANSETRON HYDROCHLORIDE 2 MG/ML
4 INJECTION, SOLUTION INTRAVENOUS EVERY 8 HOURS PRN
Status: DISCONTINUED | OUTPATIENT
Start: 2024-02-19 | End: 2024-02-21 | Stop reason: HOSPADM

## 2024-02-19 RX ORDER — SODIUM CHLORIDE, SODIUM LACTATE, POTASSIUM CHLORIDE, CALCIUM CHLORIDE 600; 310; 30; 20 MG/100ML; MG/100ML; MG/100ML; MG/100ML
100 INJECTION, SOLUTION INTRAVENOUS CONTINUOUS
Status: DISCONTINUED | OUTPATIENT
Start: 2024-02-19 | End: 2024-02-19 | Stop reason: HOSPADM

## 2024-02-19 RX ORDER — CEFAZOLIN SODIUM 2 G/100ML
2 INJECTION, SOLUTION INTRAVENOUS EVERY 8 HOURS
Status: COMPLETED | OUTPATIENT
Start: 2024-02-19 | End: 2024-02-20

## 2024-02-19 RX ORDER — ONDANSETRON HYDROCHLORIDE 2 MG/ML
4 INJECTION, SOLUTION INTRAVENOUS ONCE AS NEEDED
Status: DISCONTINUED | OUTPATIENT
Start: 2024-02-19 | End: 2024-02-19 | Stop reason: HOSPADM

## 2024-02-19 RX ORDER — ONDANSETRON 4 MG/1
4 TABLET, ORALLY DISINTEGRATING ORAL EVERY 8 HOURS PRN
Status: DISCONTINUED | OUTPATIENT
Start: 2024-02-19 | End: 2024-02-21 | Stop reason: HOSPADM

## 2024-02-19 RX ORDER — ATENOLOL 25 MG/1
25 TABLET ORAL DAILY
Status: DISCONTINUED | OUTPATIENT
Start: 2024-02-19 | End: 2024-02-21 | Stop reason: HOSPADM

## 2024-02-19 RX ORDER — LIDOCAINE HYDROCHLORIDE 10 MG/ML
INJECTION INFILTRATION; PERINEURAL AS NEEDED
Status: DISCONTINUED | OUTPATIENT
Start: 2024-02-19 | End: 2024-02-19

## 2024-02-19 RX ORDER — DEXAMETHASONE SODIUM PHOSPHATE 4 MG/ML
INJECTION, SOLUTION INTRA-ARTICULAR; INTRALESIONAL; INTRAMUSCULAR; INTRAVENOUS; SOFT TISSUE AS NEEDED
Status: DISCONTINUED | OUTPATIENT
Start: 2024-02-19 | End: 2024-02-19

## 2024-02-19 RX ORDER — PROPOFOL 10 MG/ML
INJECTION, EMULSION INTRAVENOUS AS NEEDED
Status: DISCONTINUED | OUTPATIENT
Start: 2024-02-19 | End: 2024-02-19

## 2024-02-19 RX ORDER — ALBUTEROL SULFATE 0.83 MG/ML
2.5 SOLUTION RESPIRATORY (INHALATION) ONCE AS NEEDED
Status: DISCONTINUED | OUTPATIENT
Start: 2024-02-19 | End: 2024-02-19 | Stop reason: HOSPADM

## 2024-02-19 RX ADMIN — DOCUSATE SODIUM 100 MG: 100 CAPSULE, LIQUID FILLED ORAL at 13:07

## 2024-02-19 RX ADMIN — ASPIRIN 81 MG: 81 TABLET, COATED ORAL at 13:07

## 2024-02-19 RX ADMIN — ASPIRIN 81 MG: 81 TABLET, COATED ORAL at 20:34

## 2024-02-19 RX ADMIN — LIDOCAINE HYDROCHLORIDE 5 ML: 10 INJECTION, SOLUTION INFILTRATION; PERINEURAL at 07:53

## 2024-02-19 RX ADMIN — FENTANYL CITRATE 25 MCG: 0.05 INJECTION, SOLUTION INTRAMUSCULAR; INTRAVENOUS at 09:52

## 2024-02-19 RX ADMIN — FENTANYL CITRATE 25 MCG: 50 INJECTION INTRAMUSCULAR; INTRAVENOUS at 10:25

## 2024-02-19 RX ADMIN — FENTANYL CITRATE 25 MCG: 0.05 INJECTION, SOLUTION INTRAMUSCULAR; INTRAVENOUS at 09:47

## 2024-02-19 RX ADMIN — CEFAZOLIN SODIUM 2 G: 2 INJECTION, SOLUTION INTRAVENOUS at 07:59

## 2024-02-19 RX ADMIN — SODIUM CHLORIDE 100 ML/HR: 900 INJECTION, SOLUTION INTRAVENOUS at 13:07

## 2024-02-19 RX ADMIN — DOCUSATE SODIUM 100 MG: 100 CAPSULE, LIQUID FILLED ORAL at 23:40

## 2024-02-19 RX ADMIN — POLYETHYLENE GLYCOL 3350 17 G: 17 POWDER, FOR SOLUTION ORAL at 13:07

## 2024-02-19 RX ADMIN — DEXAMETHASONE SODIUM PHOSPHATE 4 MG: 4 INJECTION, SOLUTION INTRA-ARTICULAR; INTRALESIONAL; INTRAMUSCULAR; INTRAVENOUS; SOFT TISSUE at 08:23

## 2024-02-19 RX ADMIN — Medication 100 MCG: at 07:54

## 2024-02-19 RX ADMIN — MIDAZOLAM HYDROCHLORIDE 2 MG: 1 INJECTION, SOLUTION INTRAMUSCULAR; INTRAVENOUS at 07:34

## 2024-02-19 RX ADMIN — HYDROCODONE BITARTRATE AND ACETAMINOPHEN 2 TABLET: 5; 325 TABLET ORAL at 16:37

## 2024-02-19 RX ADMIN — FENTANYL CITRATE 25 MCG: 0.05 INJECTION, SOLUTION INTRAMUSCULAR; INTRAVENOUS at 08:20

## 2024-02-19 RX ADMIN — TRANEXAMIC ACID 1000 MG: 1 INJECTION, SOLUTION INTRAVENOUS at 08:08

## 2024-02-19 RX ADMIN — FENTANYL CITRATE 25 MCG: 50 INJECTION INTRAMUSCULAR; INTRAVENOUS at 10:07

## 2024-02-19 RX ADMIN — Medication 100 MCG: at 08:03

## 2024-02-19 RX ADMIN — TRANEXAMIC ACID 1000 MG: 1 INJECTION, SOLUTION INTRAVENOUS at 09:27

## 2024-02-19 RX ADMIN — FENTANYL CITRATE 25 MCG: 50 INJECTION INTRAMUSCULAR; INTRAVENOUS at 10:17

## 2024-02-19 RX ADMIN — Medication 2 L/MIN: at 10:25

## 2024-02-19 RX ADMIN — CEFAZOLIN SODIUM 2 G: 2 INJECTION, SOLUTION INTRAVENOUS at 16:37

## 2024-02-19 RX ADMIN — ONDANSETRON HYDROCHLORIDE 4 MG: 2 INJECTION INTRAMUSCULAR; INTRAVENOUS at 09:33

## 2024-02-19 RX ADMIN — Medication 100 MCG: at 08:09

## 2024-02-19 RX ADMIN — Medication 2 L/MIN: at 12:45

## 2024-02-19 RX ADMIN — CHLORTHALIDONE 12.5 MG: 25 TABLET ORAL at 13:08

## 2024-02-19 RX ADMIN — FENTANYL CITRATE 50 MCG: 50 INJECTION INTRAMUSCULAR; INTRAVENOUS at 07:33

## 2024-02-19 RX ADMIN — ATENOLOL 25 MG: 25 TABLET ORAL at 13:08

## 2024-02-19 RX ADMIN — DILTIAZEM HYDROCHLORIDE 120 MG: 120 CAPSULE, EXTENDED RELEASE ORAL at 13:07

## 2024-02-19 RX ADMIN — CEFAZOLIN SODIUM 2 G: 2 INJECTION, SOLUTION INTRAVENOUS at 23:40

## 2024-02-19 RX ADMIN — HYDROCODONE BITARTRATE AND ACETAMINOPHEN 2 TABLET: 5; 325 TABLET ORAL at 13:07

## 2024-02-19 RX ADMIN — HYDROCODONE BITARTRATE AND ACETAMINOPHEN 1 TABLET: 5; 325 TABLET ORAL at 22:16

## 2024-02-19 RX ADMIN — FENTANYL CITRATE 25 MCG: 0.05 INJECTION, SOLUTION INTRAMUSCULAR; INTRAVENOUS at 08:25

## 2024-02-19 RX ADMIN — PROPOFOL 80 MG: 10 INJECTION, EMULSION INTRAVENOUS at 07:53

## 2024-02-19 RX ADMIN — SODIUM CHLORIDE, POTASSIUM CHLORIDE, SODIUM LACTATE AND CALCIUM CHLORIDE: 600; 310; 30; 20 INJECTION, SOLUTION INTRAVENOUS at 07:08

## 2024-02-19 RX ADMIN — DILTIAZEM HYDROCHLORIDE 120 MG: 120 CAPSULE, EXTENDED RELEASE ORAL at 20:34

## 2024-02-19 RX ADMIN — LORATADINE 10 MG: 10 TABLET ORAL at 13:08

## 2024-02-19 RX ADMIN — FENTANYL CITRATE 25 MCG: 50 INJECTION INTRAMUSCULAR; INTRAVENOUS at 10:38

## 2024-02-19 SDOH — SOCIAL STABILITY: SOCIAL INSECURITY: HAS ANYONE EVER THREATENED TO HURT YOUR FAMILY OR YOUR PETS?: NO

## 2024-02-19 SDOH — SOCIAL STABILITY: SOCIAL INSECURITY: DO YOU FEEL ANYONE HAS EXPLOITED OR TAKEN ADVANTAGE OF YOU FINANCIALLY OR OF YOUR PERSONAL PROPERTY?: NO

## 2024-02-19 SDOH — HEALTH STABILITY: MENTAL HEALTH: CURRENT SMOKER: 0

## 2024-02-19 SDOH — SOCIAL STABILITY: SOCIAL INSECURITY: ARE THERE ANY APPARENT SIGNS OF INJURIES/BEHAVIORS THAT COULD BE RELATED TO ABUSE/NEGLECT?: NO

## 2024-02-19 SDOH — SOCIAL STABILITY: SOCIAL INSECURITY: DO YOU FEEL UNSAFE GOING BACK TO THE PLACE WHERE YOU ARE LIVING?: NO

## 2024-02-19 SDOH — SOCIAL STABILITY: SOCIAL INSECURITY: ARE YOU OR HAVE YOU BEEN THREATENED OR ABUSED PHYSICALLY, EMOTIONALLY, OR SEXUALLY BY ANYONE?: NO

## 2024-02-19 SDOH — SOCIAL STABILITY: SOCIAL INSECURITY: WERE YOU ABLE TO COMPLETE ALL THE BEHAVIORAL HEALTH SCREENINGS?: YES

## 2024-02-19 SDOH — SOCIAL STABILITY: SOCIAL INSECURITY: ABUSE: ADULT

## 2024-02-19 SDOH — SOCIAL STABILITY: SOCIAL INSECURITY: HAVE YOU HAD THOUGHTS OF HARMING ANYONE ELSE?: NO

## 2024-02-19 SDOH — SOCIAL STABILITY: SOCIAL INSECURITY: DOES ANYONE TRY TO KEEP YOU FROM HAVING/CONTACTING OTHER FRIENDS OR DOING THINGS OUTSIDE YOUR HOME?: NO

## 2024-02-19 ASSESSMENT — PAIN SCALES - WONG BAKER
WONGBAKER_NUMERICALRESPONSE: HURTS LITTLE MORE
WONGBAKER_NUMERICALRESPONSE: NO HURT
WONGBAKER_NUMERICALRESPONSE: HURTS WHOLE LOT

## 2024-02-19 ASSESSMENT — COGNITIVE AND FUNCTIONAL STATUS - GENERAL
HELP NEEDED FOR BATHING: A LITTLE
TOILETING: A LITTLE
DRESSING REGULAR LOWER BODY CLOTHING: A LOT
DRESSING REGULAR UPPER BODY CLOTHING: A LITTLE
MOVING FROM LYING ON BACK TO SITTING ON SIDE OF FLAT BED WITH BEDRAILS: A LITTLE
PERSONAL GROOMING: A LITTLE
DAILY ACTIVITIY SCORE: 18
TOILETING: A LITTLE
TOILETING: A LITTLE
CLIMB 3 TO 5 STEPS WITH RAILING: A LOT
CLIMB 3 TO 5 STEPS WITH RAILING: TOTAL
MOVING TO AND FROM BED TO CHAIR: A LITTLE
PATIENT BASELINE BEDBOUND: NO
MOVING FROM LYING ON BACK TO SITTING ON SIDE OF FLAT BED WITH BEDRAILS: A LITTLE
MOBILITY SCORE: 16
MOVING FROM LYING ON BACK TO SITTING ON SIDE OF FLAT BED WITH BEDRAILS: A LITTLE
DRESSING REGULAR LOWER BODY CLOTHING: A LOT
DRESSING REGULAR UPPER BODY CLOTHING: A LITTLE
TURNING FROM BACK TO SIDE WHILE IN FLAT BAD: A LOT
MOBILITY SCORE: 12
CLIMB 3 TO 5 STEPS WITH RAILING: A LOT
MOBILITY SCORE: 16
STANDING UP FROM CHAIR USING ARMS: A LITTLE
WALKING IN HOSPITAL ROOM: A LOT
PERSONAL GROOMING: A LITTLE
DAILY ACTIVITIY SCORE: 19
MOVING TO AND FROM BED TO CHAIR: A LOT
HELP NEEDED FOR BATHING: A LITTLE
STANDING UP FROM CHAIR USING ARMS: A LITTLE
STANDING UP FROM CHAIR USING ARMS: A LOT
HELP NEEDED FOR BATHING: A LITTLE
DAILY ACTIVITIY SCORE: 18
DRESSING REGULAR UPPER BODY CLOTHING: A LITTLE
TURNING FROM BACK TO SIDE WHILE IN FLAT BAD: A LITTLE
WALKING IN HOSPITAL ROOM: A LOT
MOVING TO AND FROM BED TO CHAIR: A LITTLE
TURNING FROM BACK TO SIDE WHILE IN FLAT BAD: A LITTLE
WALKING IN HOSPITAL ROOM: A LOT
DRESSING REGULAR LOWER BODY CLOTHING: A LOT

## 2024-02-19 ASSESSMENT — PAIN SCALES - GENERAL
PAINLEVEL_OUTOF10: 3
PAINLEVEL_OUTOF10: 5 - MODERATE PAIN
PAINLEVEL_OUTOF10: 1
PAINLEVEL_OUTOF10: 4
PAINLEVEL_OUTOF10: 5 - MODERATE PAIN
PAINLEVEL_OUTOF10: 5 - MODERATE PAIN
PAINLEVEL_OUTOF10: 8
PAINLEVEL_OUTOF10: 7
PAINLEVEL_OUTOF10: 5 - MODERATE PAIN
PAINLEVEL_OUTOF10: 7
PAINLEVEL_OUTOF10: 0 - NO PAIN
PAINLEVEL_OUTOF10: 5 - MODERATE PAIN
PAINLEVEL_OUTOF10: 5 - MODERATE PAIN
PAINLEVEL_OUTOF10: 0 - NO PAIN

## 2024-02-19 ASSESSMENT — COLUMBIA-SUICIDE SEVERITY RATING SCALE - C-SSRS
2. HAVE YOU ACTUALLY HAD ANY THOUGHTS OF KILLING YOURSELF?: NO
6. HAVE YOU EVER DONE ANYTHING, STARTED TO DO ANYTHING, OR PREPARED TO DO ANYTHING TO END YOUR LIFE?: NO
1. IN THE PAST MONTH, HAVE YOU WISHED YOU WERE DEAD OR WISHED YOU COULD GO TO SLEEP AND NOT WAKE UP?: NO

## 2024-02-19 ASSESSMENT — LIFESTYLE VARIABLES
HOW MANY STANDARD DRINKS CONTAINING ALCOHOL DO YOU HAVE ON A TYPICAL DAY: PATIENT DOES NOT DRINK
HOW OFTEN DO YOU HAVE A DRINK CONTAINING ALCOHOL: NEVER
AUDIT-C TOTAL SCORE: 0
HOW OFTEN DO YOU HAVE 6 OR MORE DRINKS ON ONE OCCASION: NEVER
AUDIT-C TOTAL SCORE: 0
SKIP TO QUESTIONS 9-10: 1

## 2024-02-19 ASSESSMENT — ACTIVITIES OF DAILY LIVING (ADL)
GROOMING: NEEDS ASSISTANCE
PATIENT'S MEMORY ADEQUATE TO SAFELY COMPLETE DAILY ACTIVITIES?: YES
LACK_OF_TRANSPORTATION: NO
TOILETING: NEEDS ASSISTANCE
ADL_ASSISTANCE: NEEDS ASSISTANCE
WALKS IN HOME: NEEDS ASSISTANCE
HEARING - LEFT EAR: FUNCTIONAL
DRESSING YOURSELF: NEEDS ASSISTANCE
ASSISTIVE_DEVICE: EYEGLASSES;WALKER
FEEDING YOURSELF: INDEPENDENT
JUDGMENT_ADEQUATE_SAFELY_COMPLETE_DAILY_ACTIVITIES: YES
ADEQUATE_TO_COMPLETE_ADL: YES
BATHING_ASSISTANCE: MODERATE
BATHING: NEEDS ASSISTANCE
HEARING - RIGHT EAR: FUNCTIONAL

## 2024-02-19 ASSESSMENT — PAIN - FUNCTIONAL ASSESSMENT
PAIN_FUNCTIONAL_ASSESSMENT: FLACC (FACE, LEGS, ACTIVITY, CRY, CONSOLABILITY)
PAIN_FUNCTIONAL_ASSESSMENT: FLACC (FACE, LEGS, ACTIVITY, CRY, CONSOLABILITY)
PAIN_FUNCTIONAL_ASSESSMENT: 0-10
PAIN_FUNCTIONAL_ASSESSMENT: 0-10
PAIN_FUNCTIONAL_ASSESSMENT: FLACC (FACE, LEGS, ACTIVITY, CRY, CONSOLABILITY)
PAIN_FUNCTIONAL_ASSESSMENT: FLACC (FACE, LEGS, ACTIVITY, CRY, CONSOLABILITY)
PAIN_FUNCTIONAL_ASSESSMENT: 0-10
PAIN_FUNCTIONAL_ASSESSMENT: 0-10
PAIN_FUNCTIONAL_ASSESSMENT: FLACC (FACE, LEGS, ACTIVITY, CRY, CONSOLABILITY)
PAIN_FUNCTIONAL_ASSESSMENT: FLACC (FACE, LEGS, ACTIVITY, CRY, CONSOLABILITY)
PAIN_FUNCTIONAL_ASSESSMENT: 0-10
PAIN_FUNCTIONAL_ASSESSMENT: 0-10
PAIN_FUNCTIONAL_ASSESSMENT: WONG-BAKER FACES
PAIN_FUNCTIONAL_ASSESSMENT: WONG-BAKER FACES
PAIN_FUNCTIONAL_ASSESSMENT: 0-10
PAIN_FUNCTIONAL_ASSESSMENT: 0-10

## 2024-02-19 ASSESSMENT — PATIENT HEALTH QUESTIONNAIRE - PHQ9
SUM OF ALL RESPONSES TO PHQ9 QUESTIONS 1 & 2: 0
2. FEELING DOWN, DEPRESSED OR HOPELESS: NOT AT ALL
1. LITTLE INTEREST OR PLEASURE IN DOING THINGS: NOT AT ALL

## 2024-02-19 ASSESSMENT — PAIN DESCRIPTION - ORIENTATION
ORIENTATION: RIGHT

## 2024-02-19 ASSESSMENT — PAIN DESCRIPTION - LOCATION
LOCATION: KNEE

## 2024-02-19 NOTE — PROGRESS NOTES
Physical Therapy    Physical Therapy Evaluation    Patient Name: Alexandria Rai  MRN: [id number]  Today's Date: 2/19/2024   Time Calculation  Start Time: 1412  Stop Time: 1436  Time Calculation (min): 24 min    Assessment/Plan   PT Assessment  PT Assessment Results: Decreased strength, Decreased range of motion, Decreased endurance, Impaired balance, Decreased mobility, Decreased safety awareness, Orthopedic restrictions, Impaired judgement  Rehab Prognosis: Good  Medical Staff Made Aware: Yes  End of Session Communication: Bedside nurse  End of Session Patient Position: Bed, 3 rail up, Alarm on (son at bedside)    Assessment Comment: 82 y/o female who presents with decreased mobility as a result of R TKA. Decreaed R knee ROM, decreased strength and impaired balance. Would benefit from cont PT services to maximize safe, indep mobility with walker.    IP OR SWING BED PT PLAN  Inpatient or Swing Bed: Inpatient  PT Plan  Treatment/Interventions: Bed mobility, Transfer training, Gait training, Balance training, Stair training, Strengthening, Range of motion, Endurance training, Therapeutic exercise, Therapeutic activity  PT Plan: Skilled PT  PT Frequency: BID  PT Discharge Recommendations: Low intensity level of continued care  PT Recommended Transfer Status: Assist x1, Assistive device  PT - OK to Discharge: Yes      Subjective   General Visit Information:  General  Reason for Referral: recent surgery; s/p R TKA  Referred By: Dr. Prasad  Past Medical History Relevant to Rehab: arthritis, HLD, HTN, L TKA  Family/Caregiver Present: Yes  Caregiver Feedback: son present to assist with translating  Co-Treatment: OT  Co-Treatment Reason: safety with mobility post-op day zero  Prior to Session Communication: Bedside nurse  Patient Position Received: Bed, 3 rail up, Alarm off, not on at start of session  Preferred Learning Style: visual  General Comment: 82 y/o female who is s/p R TKA. Pt is supinein bed upon arrival. PIV.  "Agreeable to participate. Son at bedside to assist with translating  Home Living:  Home Living  Type of Home: House  Lives With: Adult children (son and daughter-in-law)  Home Adaptive Equipment: Walker rolling or standard, Cane  Home Layout: Multi-level  Home Access: Stairs to enter with rails  Entrance Stairs-Number of Steps: 6  Bathroom Shower/Tub: Walk-in shower  Bathroom Equipment: Tub transfer bench  Home Living Comments: 14 stairs down to bedroom.  Comes up to first floor main living area during the day.  Prior Level of Function:  Prior Function Per Pt/Caregiver Report  ADL Assistance:  (son assists with dressing and bathing)  Ambulatory Assistance:  (amb with \"walking cane\")  Prior Function Comments: Utilized walker after L TKA. Has been amb with cane.  Precautions:  Precautions  LE Weight Bearing Status: Weight Bearing as Tolerated  Medical Precautions: Fall precautions  Post-Surgical Precautions: Right total knee precautions  Vital Signs:       Objective   Pain:  Pain Assessment  Pain Assessment: 0-10  Pain Score: 5 - Moderate pain  Pain Type: Surgical pain  Pain Location: Knee  Pain Orientation: Right  Cognition:  Cognition  Orientation Level: Oriented X4  Attention:  (able to follow most simple commands with assist of son translating)  Impulsive: Mildly    General Assessments:  General Observation  General Observation: R knee ace bandaged. Language barrier     Activity Tolerance  Endurance: Decreased tolerance for upright activites    Sensation  Light Touch: No apparent deficits    Strength  Strength Comments: RLE grossly 3/5; slight buckling with WBing. LLE 5/5        Postural Control  Posture Comment: flexed posture    Static Sitting Balance  Static Sitting-Level of Assistance: Close supervision  Static Sitting-Comment/Number of Minutes: unsupported on EOB  Dynamic Sitting Balance  Dynamic Sitting-Comments: Sat on BSC with supervision    Static Standing Balance  Static Standing-Level of Assistance: " Minimum assistance  Static Standing-Comment/Number of Minutes: UE support of walker  Dynamic Standing Balance  Dynamic Standing-Comments: Mod assist with UE support of walker; slight buckling in RLE with WBing  Functional Assessments:  Bed Mobility  Bed Mobility: Yes  Bed Mobility 1  Bed Mobility 1: Supine to sitting  Level of Assistance 1: Minimum assistance  Bed Mobility Comments 1: HOB elevated  Bed Mobility 2  Bed Mobility  2: Sitting to supine  Level of Assistance 2: Minimum assistance  Bed Mobility Comments 2: to elevate LEs back onto bed    Transfers  Transfer: Yes  Transfer 1  Technique 1: Sit to stand, Stand to sit  Transfer Device 1: Walker  Transfer Level of Assistance 1: Minimum assistance  Trials/Comments 1: x3 trials. x 2 from elevated EOB, x 1 from BSC. Repeated cueing for proper hand placement and technique.  Transfers 2  Transfer From 2: Bed to, Commode-standard to  Transfer to 2: Commode-standard, Bed  Transfer Device 2: Walker  Transfer Level of Assistance 2: Minimum assistance, Moderate verbal cues  Trials/Comments 2: about 1 ft to/from bedside commode with walker and min/mod assist x 1. Pt primarily pivoted on LLE to complete transfer.    Ambulation/Gait Training  Ambulation/Gait Training Performed:  (lateral sidesteps to the L and transferred about 1 ft to/from [facility]. Decreaesd stance time and WBing RLE, minimal foot clearance.)  Extremity/Trunk Assessments:  RLE   RLE : Exceptions to WFL  AROM RLE (degrees)  RLE AROM Comment: R knee about 70 degrees flex sitting on EOB  Strength RLE  RLE Overall Strength:  (R quad set completed. R LAQ completed through partial range.)  LLE   LLE : Within Functional Limits  Outcome Measures:  [facility] Basic Mobility  Turning from your back to your side while in a flat bed without using bedrails: A little  Moving from lying on your back to sitting on the side of a flat bed without using bedrails: A little  Moving to and from bed to chair (including a wheelchair): A  little  Standing up from a chair using your arms (e.g. wheelchair or bedside chair): A little  To walk in hospital room: A lot  Climbing 3-5 steps with railing: A lot  Basic Mobility - Total Score: 16    Encounter Problems       Encounter Problems (Active)       Balance       dynamic (Progressing)       Start:  02/19/24    Expected End:  02/26/24       No LOB with dynamic activities, supervision and use of walker in standing            Mobility       LTG - Patient will navigate 4-6 steps with rails/device (Progressing)       Start:  02/19/24    Expected End:  02/26/24            ambulation (Progressing)       Start:  02/19/24    Expected End:  02/26/24       Pt will amb >  75 ft with wheeled walker and mod independence             Safety       precautions (Progressing)       Start:  02/19/24    Expected End:  02/26/24       Pt will maintain TKA precautions with all mobility            Transfers       sit to stand (Progressing)       Start:  02/19/24    Expected End:  02/26/24       Pt will perform sit to stand transfer with walker and mod independence.          bed to chair (Progressing)       Start:  02/19/24    Expected End:  02/26/24       Pt will perform bed to chair transfer with walker and mod independence.                 Education Documentation  Precautions, taught by Adrienne Munroe PT at 2/19/2024  3:10 PM.  Learner: Patient  Readiness: Acceptance  Method: Explanation  Response: Needs Reinforcement    Body Mechanics, taught by Adrienne Munroe PT at 2/19/2024  3:10 PM.  Learner: Patient  Readiness: Acceptance  Method: Explanation  Response: Needs Reinforcement    Mobility Training, taught by Adrienne Munroe PT at 2/19/2024  3:10 PM.  Learner: Patient  Readiness: Acceptance  Method: Explanation  Response: Needs Reinforcement    Education Comments  No comments found.

## 2024-02-19 NOTE — PROGRESS NOTES
Occupational Therapy    Evaluation    Patient Name: Alexandria Rai  MRN: [id number]  Today's Date: 2/19/2024  Time Calculation  Start Time: 1409  Stop Time: 1436  Time Calculation (min): 27 min    Assessment  IP OT Assessment  OT Assessment: Pt is 84 y/o female admitted for elective Rt TKA. Pt presents w/ decreased ADL skills/ funcitonal mobility, decreasedactivity tolerance and surgical limitations.REcommend OT services toa ddress above deficits.  Prognosis: Good  Barriers to Discharge: None  End of Session Communication: Bedside nurse  End of Session Patient Position: Bed, 3 rail up, Alarm on (Son present)  Plan:  Treatment Interventions: ADL retraining, Functional transfer training, Endurance training, Patient/family training, Equipment evaluation/education  OT Frequency: 4 times per week  OT Discharge Recommendations: Low intensity level of continued care  Equipment Recommended upon Discharge: Wheeled walker  OT - OK to Discharge: Yes    Subjective   Current Problem:  1. S/P total knee arthroplasty, right          General:  General  Reason for Referral: recent sx, Rt TKA  Referred By: Dr. Prasad  Past Medical History Relevant to Rehab: arthritis, HLD, HTN, Lt TKA  Family/Caregiver Present: Yes  Caregiver Feedback: Son present to assist w/ translation  Co-Treatment: PT  Co-Treatment Reason: safety w/ mobility  Prior to Session Communication: Bedside nurse  Patient Position Received: Bed, 3 rail up, Alarm off, not on at start of session  Preferred Learning Style: visual  General Comment: 84 y/o female dmitted for elective Rt TKA. Pt is agreeable to therapy,  Precautions:  LE Weight Bearing Status: Weight Bearing as Tolerated  Medical Precautions: Fall precautions  Post-Surgical Precautions: Right total knee precautions  Vital Signs:     Pain:  Pain Assessment  Pain Assessment: 0-10  Pain Score: 5 - Moderate pain  Pain Type: Surgical pain  Pain Location: Knee  Pain Orientation: Right    Objective    Cognition:  Orientation Level: Oriented X4  Impulsive: Mildly           Home Living:  Type of Home: House  Lives With: Adult children  Home Adaptive Equipment: Walker rolling or standard, Cane  Home Layout: Multi-level  Home Access: Stairs to enter with rails  Entrance Stairs-Rails: Right  Entrance Stairs-Number of Steps: 6  Bathroom Shower/Tub: Walk-in shower  Bathroom Equipment: Tub transfer bench  Home Living Comments: 14 steps down to bedroom, stays on main floor during the day.   Prior Function:  Level of [place]: Needs assistance with ADLs, Needs assistance with homemaking  Receives Help From: Family  ADL Assistance: Needs assistance (for dressing, bathing)  Homemaking Assistance: Needs assistance (son does cooking, cleaning)  Ambulatory Assistance: Independent (w/ walking cane)  Hand Dominance: Right  IADL History:     ADL:  Eating Assistance: Independent  Eating Deficit: Setup  Grooming Assistance: Stand by  Grooming Deficit: Setup (per clinical judgement)  Bathing Assistance: Moderate  Bathing Deficit: Right lower leg including foot, Right upper leg (per clinical judgement)  UE Dressing Assistance: Minimal  UE Dressing Deficit: Pull around back, Pull down in back (per clinical judgement)  LE Dressing Assistance: Moderate  LE Dressing Deficit: Thread RLE into pants, Thread RLE into underwear, Pull up over hips, Don/doff R sock (per clinical judgement)  Toileting Assistance with Device: Stand by  Toileting Deficit: Perineal hygiene  Functional Assistance: Moderate  Functional Deficit: Toilet transfer  Activity Tolerance:  Endurance: Decreased tolerance for upright activites  Bed Mobility/Transfers: Bed Mobility  Bed Mobility: Yes  Bed Mobility 1  Bed Mobility 1: Supine to sitting  Level of Assistance 1: Minimum assistance  Bed Mobility Comments 1: HOB elev.  Bed Mobility 2  Bed Mobility  2: Sitting to supine  Level of Assistance 2: Minimum assistance  Bed Mobility Comments 2: to bring legs up into  bed    Transfers  Transfer: Yes  Transfer 1  Technique 1: Sit to stand, Stand to sit  Transfer Device 1: Walker  Transfer Level of Assistance 1: Minimum assistance  Trials/Comments 1: x3, 2x from EOB, 1x from BSC. Buckling in RLE noted.  Transfers 2  Transfer From 2: Bed to, Commode-standard to  Transfer to 2: Commode-standard  Technique 2: Sit to stand, Stand to sit  Transfer Device 2: Walker  Transfer Level of Assistance 2: Minimum assistance  Trials/Comments 2: Pt ambulated from bed to BSC to bed w/ min. assist and FWW.  Modalities:     IADL's:      Vision: Vision - Basic Assessment  Current Vision: Does not wear glasses  Sensation:  Light Touch: No apparent deficits  Strength:  Strength Comments: 3+/5 grossly per functional observ.  Perception:     Coordination:  Movements are Fluid and Coordinated: Yes   Hand Function:  Hand Function  Gross Grasp: Functional  Coordination: Functional  Extremities: RUE   RUE : Within Functional Limits and LUE   LUE: Within Functional Limits      Outcome Measures: [facility] Daily Activity  Putting on and taking off regular lower body clothing: A lot  Bathing (including washing, rinsing, drying): A little  Putting on and taking off regular upper body clothing: A little  Toileting, which includes using toilet, bedpan or urinal: A little  Taking care of personal grooming such as brushing teeth: A little  Eating Meals: None  Daily Activity - Total Score: 18      Education Documentation  ADL Training, taught by Nicole Dominguez OT at 2/19/2024  3:44 PM.  Learner: Patient  Readiness: Acceptance  Method: Demonstration  Response: Needs Reinforcement    Education Comments  No comments found.      Goals:   Encounter Problems       Encounter Problems (Active)           OT Goals       ADL's (Progressing)       Start:  02/19/24    Expected End:  03/04/24       Pt will complete ADL tasks w/ close supervision w/ AE as needed for ease, safety and increased independence in self care tasks.          Functional transfers (Progressing)       Start:  02/19/24    Expected End:  03/04/24       Pt will demon. Bed, chair and toilet transfers w/ mod I w/ LRD for safety and increased independence in functional transfers.         Precautions (Progressing)       Start:  02/19/24    Expected End:  03/04/24       Pt will verbalize/demon surgical precautions for safety and increased independence in self care tasks and functional transfers.

## 2024-02-19 NOTE — NURSING NOTE
Patient arrived to unit at this time from PACU. Alert and oriented (language barrier), family at bedside to help translate/answer questions. Vitals stable, pain tolerable. Call light and personal belongings within reach. Admission in progress, plan of care ongoing. No additional needs at this time.

## 2024-02-19 NOTE — CONSULTS
Inpatient consult to Medicine  Consult performed by: Jenna Doyle, BIB-CNP  Consult ordered by: Declan Prasad MD  Reason for consult: Hyperlipidemia, HTN          Reason For Consult  Was consulted for postoperative management of hypertension    History Of Present Illness  Alexandria Rai is a 83 y.o. female presenting with osteoarthritis of her right knee who underwent RT TKA today by Dr. Prasad.  Patient is alert and oriented x 3 son is at the bedside and able to translate.  Patient rates her current pain a 5 out of 10 to her right knee.  She denies any other complaints at this time.  She tolerated lunch with no nausea and vomiting.  Son said that she was recently diagnosed with hypertension and rapid heart rate.     Past Medical History  She has a past medical history of Arthritis, Drug induced constipation, Hyperlipidemia, Hypertension, Joint pain, Osteoarthritis of both knees, Sciatica, and Vitamin D deficiency.    Surgical History  She has a past surgical history that includes Vaginal prolapse repair; Cataract extraction; and Knee Arthroplasty (Left, 10/23/2023).     Social History  She reports that she has never smoked. She has never been exposed to tobacco smoke. She has never used smokeless tobacco. She reports that she does not drink alcohol and does not use drugs.    Family History  Family History   Problem Relation Name Age of Onset    Diabetes Mother      Arthritis Mother          Allergies  Patient has no known allergies.    Review of Systems   All other systems reviewed and are negative.       Physical Exam  Constitutional:       General: She is not in acute distress.     Appearance: She is not ill-appearing or toxic-appearing.   Cardiovascular:      Rate and Rhythm: Normal rate and regular rhythm.      Pulses: Normal pulses.      Heart sounds: Normal heart sounds.   Pulmonary:      Effort: Pulmonary effort is normal.      Breath sounds: Normal breath sounds.   Abdominal:      General: Bowel  sounds are normal.      Palpations: Abdomen is soft.   Musculoskeletal:         General: Normal range of motion.   Skin:     General: Skin is warm and dry.      Comments: Dressing to right lower extremity dry and intact   Neurological:      General: No focal deficit present.      Mental Status: She is alert and oriented to person, place, and time.   Psychiatric:         Mood and Affect: Mood normal.         Behavior: Behavior normal.          Last Recorded Vitals  /60 (BP Location: Right arm, Patient Position: Lying)   Pulse 68   Temp 36.4 °C (97.5 °F) (Temporal)   Resp 18   Wt 70.8 kg (156 lb)   SpO2 95%     Relevant Results       Assessment/Plan     HTN  -Monitor blood pressure  -Continue atenolol and Cardizem with parameters  -Hold chlorthalidone    RT TKA  -DVT prophylaxis per orthopedic surgeon  -Pain control  -PT, OT    Plan  Plan discussed at length with patient she is in agreement  Thank you for this consultation we will follow along  BIB Choi-CNP

## 2024-02-19 NOTE — ANESTHESIA PREPROCEDURE EVALUATION
Patient: Alexandria Rai    Procedure Information       Date/Time: 02/19/24 0730    Procedure: Arthroplasty Total Knee (Right: Knee) - [employer]    Location: [facility] / [facility]    Surgeons: Declan Prasad MD            Relevant Problems   Cardiovascular   (+) Hyperlipidemia   (+) Primary hypertension      Neuro/Psych   (+) Sciatica      Musculoskeletal   (+) Osteoarthritis   (+) Spinal stenosis, lumbar region, with neurogenic claudication      Musculoskeletal and Injuries   (+) S/P total knee arthroplasty, left     Past Surgical History:   Procedure Laterality Date   • CATARACT EXTRACTION     • KNEE ARTHROPLASTY Left 10/23/2023   • VAGINAL PROLAPSE REPAIR       Pt speaks no English. Discussed through her son, who translated.    Clinical information reviewed:   Tobacco  Allergies  Meds  Problems  Med Hx  Surg Hx   Fam Hx  Soc   Hx        NPO Detail:  NPO/Void Status  Carbohydrate Drink Given Prior to Surgery? : N  Date of Last Liquid: 02/18/24  Time of Last Liquid: 2030  Date of Last Solid: 02/18/24  Time of Last Solid: 1900  Last Intake Type: Clear fluids         Physical Exam    Airway  Mallampati: III  TM distance: >3 FB  Neck ROM: limited     Cardiovascular    Dental   Comments: POOR DENTITION   Pulmonary    Abdominal        Anesthesia Plan    History of general anesthesia?: yes  History of complications of general anesthesia?: no    ASA 2     general and regional   (Plan GA with LMA,adductor canal block)  The patient is not a current smoker.  Education provided regarding risk of obstructive sleep apnea.  Anesthetic plan and risks discussed with patient and legal guardian.  Use of blood products discussed with patient who consented to blood products.

## 2024-02-19 NOTE — PERIOPERATIVE NURSING NOTE
0609- Pt to SDS 18 via w/c for unsteady gait and scheduled for right knee replacement. Pt speaks minimal English, primary language French. Son at bedside to interpret, pt and son decline [facility]  at this time. Stated the French with hospital  system is not the same dialect and son needs to reinterpret.     0705- Povidone-Iodine antiseptic applied to bilateral nares    0720- Dr Frames to bedside. [facility]  to bedside for consent. Discussed POC, pt denies questions

## 2024-02-19 NOTE — ANESTHESIA PROCEDURE NOTES
Airway  Date/Time: 2/19/2024 7:56 AM  Urgency: elective    Airway not difficult    Staffing  Performed: attending   Authorized by: Melba Vazquez MD    Performed by: Melba Vazquez MD  Patient location during procedure: OR    Indications and Patient Condition  Indications for airway management: anesthesia  Spontaneous Ventilation: absent  Sedation level: deep  Preoxygenated: yes  Mask difficulty assessment: 1 - vent by mask    Final Airway Details  Final airway type: supraglottic airway      Successful airway: classic  Size 3     Number of attempts at approach: 1  Ventilation between attempts: none  Number of other approaches attempted: 0    Additional Comments  Easy LMA placement

## 2024-02-19 NOTE — CARE PLAN
The patient's goals for the shift include  manage pain, work with therapy, rest.    The clinical goals for the shift include manage pain, PT/OT, monitor labs and VS, promote rest.    No barriers to meeting these goals. Patient currently eating dinner in bed and visiting with family. Call light and personal belongings within reach. No additional needs at this time.       Problem: Pain  Goal: My pain/discomfort is manageable  Outcome: Progressing     Problem: Safety  Goal: Patient will be injury free during hospitalization  Outcome: Progressing  Goal: I will remain free of falls  Outcome: Progressing     Problem: Daily Care  Goal: Daily care needs are met  Outcome: Progressing     Problem: Psychosocial Needs  Goal: Demonstrates ability to cope with hospitalization/illness  Outcome: Progressing  Goal: Collaborate with me, my family, and caregiver to identify my specific goals  Outcome: Progressing  Flowsheets (Taken 2/19/2024 1240)  Cultural Requests During Hospitalization: KOSHER/Oriental orthodox  Spiritual Requests During Hospitalization: Oriental orthodox     Problem: Discharge Barriers  Goal: My discharge needs are met  Outcome: Progressing     Problem: Fall/Injury  Goal: Not fall by end of shift  Outcome: Progressing  Goal: Be free from injury by end of the shift  Outcome: Progressing  Goal: Verbalize understanding of personal risk factors for fall in the hospital  Outcome: Progressing  Goal: Verbalize understanding of risk factor reduction measures to prevent injury from fall in the home  Outcome: Progressing  Goal: Use assistive devices by end of the shift  Outcome: Progressing  Goal: Pace activities to prevent fatigue by end of the shift  Outcome: Progressing     Problem: Pain  Goal: Takes deep breaths with improved pain control throughout the shift  Outcome: Progressing  Goal: Turns in bed with improved pain control throughout the shift  Outcome: Progressing  Goal: Walks with improved pain control throughout the shift  Outcome:  Progressing  Goal: Performs ADL's with improved pain control throughout shift  Outcome: Progressing  Goal: Participates in PT with improved pain control throughout the shift  Outcome: Progressing  Goal: Free from opioid side effects throughout the shift  Outcome: Progressing  Goal: Free from acute confusion related to pain meds throughout the shift  Outcome: Progressing

## 2024-02-19 NOTE — OP NOTE
Arthroplasty Total Knee (R) Operative Note     Date: 2024  OR Location: [facility]    Name: Alexandria Rai, : 1940, Age: 83 y.o., MRN: [id number], Sex: female    Diagnosis  Pre-op Diagnosis     * Primary osteoarthritis of right knee [M17.11] Post-op Diagnosis     * Primary osteoarthritis of right knee [M17.11]     Procedures  Arthroplasty Total Knee  04662 - AK ARTHRP KNE CONDYLE&PLATU MEDIAL&LAT COMPARTMENTS      Surgeons      * Declan Prasad - Primary    Resident/Fellow/Other Assistant:  Surgeon(s) and Role:    Procedure Summary  Anesthesia: General  ASA: II  Anesthesia Staff: Anesthesiologist: Melba Vazquez MD  Estimated Blood Loss: 50 mL  Intra-op Medications:   Administrations occurring from 0730 to 1030 on 24:   Medication Name Total Dose   vancomycin (Vancocin) vial for injection 2 g   ceFAZolin in dextrose (iso-os) (Ancef) IVPB 2 g 2 g   fentaNYL PF (Sublimaze) injection 50 mcg 50 mcg   midazolam (Versed) injection 2 mg 2 mg              Anesthesia Record               Intraprocedure I/O Totals          Intake    Tranexamic Acid 0.00 mL    The total shown is the total volume documented since Anesthesia Start was filed.    Total Intake 0 mL          Specimen: No specimens collected     Staff:   Circulator: Michael Husain RN  Scrub Person: Abigail Sanabria         Drains and/or Catheters: * None in log *    Tourniquet Times:     Total Tourniquet Time Documented:  Thigh (Right) - 74 minutes  Total: Thigh (Right) - 74 minutes      Implants:  Implants       Type Name Action Serial No.      Implant CEMENT, BONE, SIMPLEX HV FULL DOSE  40 GM - [id number] Implanted      Implant CEMENT, BONE, SIMPLEX HV FULL DOSE  40 GM - [id number] Implanted      Joint BASEPLATE, TIBIA 3 TS TRIATH - [id number] Implanted      Joint FEM COMP, TRIATH BERNICE PS P 4 RIGHT - [id number] Implanted      Joint PEG, FEMORAL DISTAL FIXATION - [id number] Implanted      Joint PATELLA, TRIATHLON, ASYMMETRIC X3, SZ-A32 10MM -  [id number] Implanted      Joint INSERT, TIBIAL, X3 TRIATHLON PS, SZ-3 11MM - [id number] Implanted               Findings: See op note    Indications: Alexandria Rai is an 83 y.o. female who is having surgery for RIGHT KNEE OSTEOARTHRITIS.  This is a patient who has severe arthritis in the above knee. The patient had difficulty with daily activities and had failed all conservative management. Options were discussed.  The patient desired total knee arthroplasty understanding the risks to include, but are not be limited to: loss of life, loss of limb, need for further surgery, nonunion, malunion, infection, nerve and vessel injury, leg length discrepancy, stiffness, and thromboembolic complication. Informed consent was obtained.    The patient was seen in the preoperative area. The risks, benefits, complications, treatment options, non-operative alternatives, expected recovery and outcomes were discussed with the patient. The possibilities of reaction to medication, pulmonary aspiration, injury to surrounding structures, bleeding, recurrent infection, the need for additional procedures, failure to diagnose a condition, and creating a complication requiring transfusion or operation were discussed with the patient. The patient concurred with the proposed plan, giving informed consent.  The site of surgery was properly noted/marked if necessary per policy. The patient has been actively warmed in preoperative area. Preoperative antibiotics have been ordered and given within 1 hours of incision. Venous thrombosis prophylaxis have been ordered including unilateral sequential compression device    Procedure Details: DESCRIPTION OF PROCEDURE: The patient was transferred to the operative suite after appropriate preoperative preparation and site marking. Preoperative intravenous antibiotics and tranexamic acid were administered as indicated. Anesthesia was induced.  The knee was prepared in the usual sterile fashion, draped in  the usual sterile fashion and incision marked. The tourniquet was inflated to appropriate pressure. Incision was made in the midline. Medial parapatellar approach was utilized. The fat pad was removed, patella was everted, and gentle medial dissection was performed along the proximal tibia. Eburnated bone was noted in more than one compartment. The knee was flexed and the femoral starting point was identified medial to [place]´s line, anterior to the PCL insertion, and the intramedullary guide was utilized to cut the femur in 5 degrees valgus. Next, the knee was maximally flexed and the posterior cruciate retractor was utilized to retract the tibia forward and the remaining menisci were removed. The intra medullary tibial guide was placed in line with the tibia and was used to cut the tibia at the appropriate depth and slope based on preoperative planning. Soft tissues were protected throughout the cuts.  The bone fragments were removed and tibial osteophytes were removed.  We then placed our size 9 millimeter spacer block in extension which had good varus and valgus stability.  Neck the femoral sizing guide was utilized and showed the listed size above was appropriate size. The finishing cutting guide was then utilized with the axis set at 3 degrees external based on the epicondylar axis. The finishing cuts were made with the soft tissue protected, bone fragments were removed, and remaining femoral osteophytes were removed.  The baseplate tibial trial was then placed in appropriate rotation with the guide just at the medial one third of the tibial tubercle and the punch and peg were used. the posterior osteophytes were removed in their entirety along with any other posterior debris.  The trial was placed and femur prepared for the component. Trialing was performed, and after appropriate soft tissue balancing showed there was excellent equality of flexion/extension gaps and excellent varus/valgus stability  throughout a range of motion with the above size spacer. The patella was then treated as indicated above and instrumented based on the amount of patellar wear. Next, the patellar tracking was seen to be excellent with no thumbs.  A lateral release was not necessary.  Next, thorough irrigation was performed and the final implants were cemented into place. The cement was allowed to cure and excess cement was removed. The final spacer was placed. This snapped in quite nicely and, again, motions were excellent and stability was excellent throughout a range of motion from 0 to 125 degrees. Thorough irrigation was performed and the wound was closed using #1 Vicryl sutures in the parapatellar approach along with #1 Stratafix, 2-0 monocryl sutures in the subcutaneous skin, and 3-0 V-Loc used in the cutaneous skin. Prineo dressing with Derma kenney was used for wound sealant and sterile dressings were applied when the Dermabond was dry. The patient was awakened and transferred to recovery room in stable condition.     PLAN: The patient will be weightbearing as tolerated on the operative lower extremity.  Aspirin 81 milligrams twice a day for deep vein thrombosis prophylaxis.  Standard postoperative knee replacement protocols will be followed.  Complications:  None; patient tolerated the procedure well.    Disposition: PACU - hemodynamically stable.  Condition: stable         Additional Details: None    Attending Attestation: I performed the procedure.    Declan Prasad  Phone Number: [phone]

## 2024-02-19 NOTE — ANESTHESIA POSTPROCEDURE EVALUATION
Patient: Alexandria Rai    Procedure Summary       Date: 02/19/24 Room / Location: [facility] / [facility]    Anesthesia Start: 0743 Anesthesia Stop: 1003    Procedure: Arthroplasty Total Knee (Right: Knee) Diagnosis:       Primary osteoarthritis of right knee      (RIGHT KNEE OSTEOARTHRITIS)    Surgeons: Declan Prasad MD Responsible Provider: Melba Vazquez MD    Anesthesia Type: general, regional ASA Status: 2            Anesthesia Type: general, regional    Vitals Value Taken Time   /74 02/19/24 1003   Temp 36.2 02/19/24 1003   Pulse 80 02/19/24 1003   Resp 17 02/19/24 1003   SpO2 96 02/19/24 1003       Anesthesia Post Evaluation    Patient location during evaluation: PACU  Patient participation: complete - patient cannot participate  Level of consciousness: awake and alert  Pain management: adequate  Multimodal analgesia pain management approach  Airway patency: patent  Two or more strategies used to mitigate risk of obstructive sleep apnea  Cardiovascular status: acceptable  Respiratory status: acceptable  Hydration status: acceptable  Postoperative Nausea and Vomiting: none  Comments: Pt unable to  communicate due to language barrier, but awake and alert. Appears comfortable.      There were no known notable events for this encounter.

## 2024-02-19 NOTE — CARE PLAN
Problem: Balance  Goal: dynamic  Description: No LOB with dynamic activities, supervision and use of walker in standing  Outcome: Progressing     Problem: Mobility  Goal: LTG - Patient will navigate 4-6 steps with rails/device  Outcome: Progressing  Goal: ambulation  Description: Pt will amb >  75 ft with wheeled walker and mod independence   Outcome: Progressing     Problem: Safety  Goal: precautions  Description: Pt will maintain TKA precautions with all mobility  Outcome: Progressing     Problem: Transfers  Goal: sit to stand  Description: Pt will perform sit to stand transfer with walker and mod independence.   Outcome: Progressing  Goal: bed to chair  Description: Pt will perform bed to chair transfer with walker and mod independence.   Outcome: Progressing

## 2024-02-20 PROBLEM — Z96.651 S/P TOTAL KNEE ARTHROPLASTY, RIGHT: Status: ACTIVE | Noted: 2024-02-20

## 2024-02-20 LAB
ANION GAP SERPL CALC-SCNC: 8 MMOL/L
BUN SERPL-MCNC: 18 MG/DL (ref 8–25)
CALCIUM SERPL-MCNC: 8.5 MG/DL (ref 8.5–10.4)
CHLORIDE SERPL-SCNC: 99 MMOL/L (ref 97–107)
CO2 SERPL-SCNC: 27 MMOL/L (ref 24–31)
CREAT SERPL-MCNC: 0.7 MG/DL (ref 0.4–1.6)
EGFRCR SERPLBLD CKD-EPI 2021: 86 ML/MIN/1.73M*2
ERYTHROCYTE [DISTWIDTH] IN BLOOD BY AUTOMATED COUNT: 15.1 % (ref 11.5–14.5)
GLUCOSE SERPL-MCNC: 133 MG/DL (ref 65–99)
HCT VFR BLD AUTO: 33.2 % (ref 36–46)
HGB BLD-MCNC: 10.7 G/DL (ref 12–16)
IRON SATN MFR SERPL: 12 % (ref 12–50)
IRON SERPL-MCNC: 27 UG/DL (ref 30–160)
MCH RBC QN AUTO: 26.3 PG (ref 26–34)
MCHC RBC AUTO-ENTMCNC: 32.2 G/DL (ref 32–36)
MCV RBC AUTO: 82 FL (ref 80–100)
NRBC BLD-RTO: 0 /100 WBCS (ref 0–0)
PLATELET # BLD AUTO: 261 X10*3/UL (ref 150–450)
POTASSIUM SERPL-SCNC: 3.7 MMOL/L (ref 3.4–5.1)
RBC # BLD AUTO: 4.07 X10*6/UL (ref 4–5.2)
SODIUM SERPL-SCNC: 134 MMOL/L (ref 133–145)
TIBC SERPL-MCNC: 232 UG/DL (ref 228–428)
UIBC SERPL-MCNC: 205 UG/DL (ref 110–370)
WBC # BLD AUTO: 9.2 X10*3/UL (ref 4.4–11.3)

## 2024-02-20 PROCEDURE — 7100000011 HC EXTENDED STAY RECOVERY HOURLY - NURSING UNIT

## 2024-02-20 PROCEDURE — 85027 COMPLETE CBC AUTOMATED: CPT | Performed by: STUDENT IN AN ORGANIZED HEALTH CARE EDUCATION/TRAINING PROGRAM

## 2024-02-20 PROCEDURE — 97110 THERAPEUTIC EXERCISES: CPT | Mod: GP,CQ

## 2024-02-20 PROCEDURE — 2500000004 HC RX 250 GENERAL PHARMACY W/ HCPCS (ALT 636 FOR OP/ED): Performed by: NURSE PRACTITIONER

## 2024-02-20 PROCEDURE — 99222 1ST HOSP IP/OBS MODERATE 55: CPT | Performed by: NURSE PRACTITIONER

## 2024-02-20 PROCEDURE — 2500000001 HC RX 250 WO HCPCS SELF ADMINISTERED DRUGS (ALT 637 FOR MEDICARE OP): Performed by: STUDENT IN AN ORGANIZED HEALTH CARE EDUCATION/TRAINING PROGRAM

## 2024-02-20 PROCEDURE — 97535 SELF CARE MNGMENT TRAINING: CPT | Mod: GO

## 2024-02-20 PROCEDURE — 2500000004 HC RX 250 GENERAL PHARMACY W/ HCPCS (ALT 636 FOR OP/ED): Performed by: STUDENT IN AN ORGANIZED HEALTH CARE EDUCATION/TRAINING PROGRAM

## 2024-02-20 PROCEDURE — 80048 BASIC METABOLIC PNL TOTAL CA: CPT | Performed by: NURSE PRACTITIONER

## 2024-02-20 PROCEDURE — 36415 COLL VENOUS BLD VENIPUNCTURE: CPT | Performed by: NURSE PRACTITIONER

## 2024-02-20 PROCEDURE — 83540 ASSAY OF IRON: CPT | Performed by: NURSE PRACTITIONER

## 2024-02-20 PROCEDURE — 2500000005 HC RX 250 GENERAL PHARMACY W/O HCPCS: Performed by: STUDENT IN AN ORGANIZED HEALTH CARE EDUCATION/TRAINING PROGRAM

## 2024-02-20 PROCEDURE — 97116 GAIT TRAINING THERAPY: CPT | Mod: GP,CQ

## 2024-02-20 PROCEDURE — 2500000001 HC RX 250 WO HCPCS SELF ADMINISTERED DRUGS (ALT 637 FOR MEDICARE OP): Performed by: NURSE PRACTITIONER

## 2024-02-20 RX ORDER — ASPIRIN 81 MG/1
81 TABLET ORAL 2 TIMES DAILY
Qty: 60 TABLET | Refills: 0 | Status: SHIPPED | OUTPATIENT
Start: 2024-02-20 | End: 2024-02-20 | Stop reason: SDUPTHER

## 2024-02-20 RX ORDER — ASPIRIN 81 MG/1
81 TABLET ORAL 2 TIMES DAILY
Qty: 60 TABLET | Refills: 0 | Status: SHIPPED | OUTPATIENT
Start: 2024-02-20 | End: 2024-03-21

## 2024-02-20 RX ORDER — HYDROCODONE BITARTRATE AND ACETAMINOPHEN 5; 325 MG/1; MG/1
1 TABLET ORAL EVERY 6 HOURS PRN
Qty: 40 TABLET | Refills: 0 | Status: SHIPPED | OUTPATIENT
Start: 2024-02-20

## 2024-02-20 RX ORDER — HYDROCODONE BITARTRATE AND ACETAMINOPHEN 5; 325 MG/1; MG/1
1 TABLET ORAL EVERY 6 HOURS PRN
Qty: 40 TABLET | Refills: 0 | Status: SHIPPED | OUTPATIENT
Start: 2024-02-20 | End: 2024-02-20 | Stop reason: SDUPTHER

## 2024-02-20 RX ADMIN — LORATADINE 10 MG: 10 TABLET ORAL at 08:33

## 2024-02-20 RX ADMIN — POLYETHYLENE GLYCOL 3350 17 G: 17 POWDER, FOR SOLUTION ORAL at 08:32

## 2024-02-20 RX ADMIN — DILTIAZEM HYDROCHLORIDE 120 MG: 120 CAPSULE, EXTENDED RELEASE ORAL at 08:32

## 2024-02-20 RX ADMIN — HYDROCODONE BITARTRATE AND ACETAMINOPHEN 1 TABLET: 5; 325 TABLET ORAL at 12:09

## 2024-02-20 RX ADMIN — HYDROCODONE BITARTRATE AND ACETAMINOPHEN 2 TABLET: 5; 325 TABLET ORAL at 02:10

## 2024-02-20 RX ADMIN — CEFAZOLIN SODIUM 2 G: 2 INJECTION, SOLUTION INTRAVENOUS at 08:33

## 2024-02-20 RX ADMIN — HYDROCODONE BITARTRATE AND ACETAMINOPHEN 2 TABLET: 5; 325 TABLET ORAL at 08:32

## 2024-02-20 RX ADMIN — ATENOLOL 25 MG: 25 TABLET ORAL at 05:27

## 2024-02-20 RX ADMIN — Medication 21 PERCENT: at 16:00

## 2024-02-20 RX ADMIN — HYDROCODONE BITARTRATE AND ACETAMINOPHEN 2 TABLET: 5; 325 TABLET ORAL at 21:07

## 2024-02-20 RX ADMIN — DILTIAZEM HYDROCHLORIDE 120 MG: 120 CAPSULE, EXTENDED RELEASE ORAL at 21:07

## 2024-02-20 RX ADMIN — Medication 2 L/MIN: at 00:00

## 2024-02-20 RX ADMIN — HYDROCODONE BITARTRATE AND ACETAMINOPHEN 2 TABLET: 5; 325 TABLET ORAL at 16:42

## 2024-02-20 RX ADMIN — Medication 21 PERCENT: at 08:00

## 2024-02-20 RX ADMIN — ASPIRIN 81 MG: 81 TABLET, COATED ORAL at 09:00

## 2024-02-20 RX ADMIN — IRON SUCROSE 200 MG: 20 INJECTION, SOLUTION INTRAVENOUS at 10:11

## 2024-02-20 RX ADMIN — DOCUSATE SODIUM 100 MG: 100 CAPSULE, LIQUID FILLED ORAL at 12:09

## 2024-02-20 RX ADMIN — ASPIRIN 81 MG: 81 TABLET, COATED ORAL at 21:07

## 2024-02-20 ASSESSMENT — PAIN SCALES - GENERAL
PAINLEVEL_OUTOF10: 4
PAINLEVEL_OUTOF10: 9
PAINLEVEL_OUTOF10: 7
PAINLEVEL_OUTOF10: 5 - MODERATE PAIN
PAINLEVEL_OUTOF10: 4
PAINLEVEL_OUTOF10: 4
PAINLEVEL_OUTOF10: 7
PAINLEVEL_OUTOF10: 6

## 2024-02-20 ASSESSMENT — PAIN - FUNCTIONAL ASSESSMENT
PAIN_FUNCTIONAL_ASSESSMENT: 0-10
PAIN_FUNCTIONAL_ASSESSMENT: WONG-BAKER FACES
PAIN_FUNCTIONAL_ASSESSMENT: 0-10
PAIN_FUNCTIONAL_ASSESSMENT: WONG-BAKER FACES
PAIN_FUNCTIONAL_ASSESSMENT: WONG-BAKER FACES
PAIN_FUNCTIONAL_ASSESSMENT: 0-10
PAIN_FUNCTIONAL_ASSESSMENT: WONG-BAKER FACES
PAIN_FUNCTIONAL_ASSESSMENT: 0-10
PAIN_FUNCTIONAL_ASSESSMENT: 0-10
PAIN_FUNCTIONAL_ASSESSMENT: WONG-BAKER FACES

## 2024-02-20 ASSESSMENT — COGNITIVE AND FUNCTIONAL STATUS - GENERAL
MOVING FROM LYING ON BACK TO SITTING ON SIDE OF FLAT BED WITH BEDRAILS: A LITTLE
TOILETING: A LITTLE
DRESSING REGULAR UPPER BODY CLOTHING: A LITTLE
MOVING TO AND FROM BED TO CHAIR: A LITTLE
TURNING FROM BACK TO SIDE WHILE IN FLAT BAD: A LITTLE
DAILY ACTIVITIY SCORE: 19
DRESSING REGULAR LOWER BODY CLOTHING: A LITTLE
DRESSING REGULAR LOWER BODY CLOTHING: A LITTLE
TURNING FROM BACK TO SIDE WHILE IN FLAT BAD: A LITTLE
WALKING IN HOSPITAL ROOM: A LITTLE
CLIMB 3 TO 5 STEPS WITH RAILING: A LOT
STANDING UP FROM CHAIR USING ARMS: A LITTLE
HELP NEEDED FOR BATHING: A LITTLE
MOBILITY SCORE: 17
DAILY ACTIVITIY SCORE: 19
TURNING FROM BACK TO SIDE WHILE IN FLAT BAD: A LITTLE
MOVING TO AND FROM BED TO CHAIR: A LITTLE
STANDING UP FROM CHAIR USING ARMS: A LITTLE
MOVING TO AND FROM BED TO CHAIR: A LITTLE
STANDING UP FROM CHAIR USING ARMS: A LITTLE
WALKING IN HOSPITAL ROOM: A LITTLE
TOILETING: A LITTLE
CLIMB 3 TO 5 STEPS WITH RAILING: A LOT
MOBILITY SCORE: 17
PERSONAL GROOMING: A LITTLE
DRESSING REGULAR UPPER BODY CLOTHING: A LITTLE
MOBILITY SCORE: 17
MOVING FROM LYING ON BACK TO SITTING ON SIDE OF FLAT BED WITH BEDRAILS: A LITTLE
PERSONAL GROOMING: A LITTLE
WALKING IN HOSPITAL ROOM: A LITTLE
MOVING FROM LYING ON BACK TO SITTING ON SIDE OF FLAT BED WITH BEDRAILS: A LITTLE
CLIMB 3 TO 5 STEPS WITH RAILING: A LOT
HELP NEEDED FOR BATHING: A LITTLE

## 2024-02-20 ASSESSMENT — PAIN SCALES - WONG BAKER
WONGBAKER_NUMERICALRESPONSE: HURTS LITTLE BIT
WONGBAKER_NUMERICALRESPONSE: HURTS WHOLE LOT
WONGBAKER_NUMERICALRESPONSE: HURTS EVEN MORE
WONGBAKER_NUMERICALRESPONSE: HURTS EVEN MORE

## 2024-02-20 ASSESSMENT — PAIN DESCRIPTION - LOCATION
LOCATION: KNEE

## 2024-02-20 ASSESSMENT — ENCOUNTER SYMPTOMS
CHILLS: 0
COUGH: 0
CONSTIPATION: 1
ABDOMINAL PAIN: 0
FEVER: 0
DYSURIA: 0
CONFUSION: 0
NAUSEA: 0
AGITATION: 0
DIZZINESS: 1
SHORTNESS OF BREATH: 0
ARTHRALGIAS: 1

## 2024-02-20 ASSESSMENT — PAIN DESCRIPTION - ORIENTATION
ORIENTATION: RIGHT

## 2024-02-20 ASSESSMENT — ACTIVITIES OF DAILY LIVING (ADL): HOME_MANAGEMENT_TIME_ENTRY: 31

## 2024-02-20 NOTE — PROGRESS NOTES
Physical Therapy    Physical Therapy Treatment    Patient Name: Alexandria Rai  MRN: [id number]  Today's Date: 2/20/2024  Time Calculation  Start Time: 0911  Stop Time: 0949  Time Calculation (min): 38 min       Assessment/Plan   PT Assessment  PT Assessment Results: Decreased strength, Decreased range of motion, Decreased endurance, Impaired balance, Decreased mobility, Decreased safety awareness, Orthopedic restrictions, Impaired judgement  Rehab Prognosis: Good  End of Session Communication: Bedside nurse, Care Coordinator  End of Session Patient Position: Up in chair, Alarm off, caregiver present  PT Plan  Inpatient/Swing Bed or Outpatient: Inpatient  PT Plan  Treatment/Interventions: Bed mobility, Transfer training, Gait training, Therapeutic exercise  PT Plan: Skilled PT  PT Frequency: BID  PT Discharge Recommendations: Low intensity level of continued care  Equipment Recommended upon Discharge: Wheeled walker  PT Recommended Transfer Status: Assist x1, Assistive device  PT - OK to Discharge: Yes      General Visit Information:   PT  Visit  PT Received On: 02/20/24  General  Family/Caregiver Present: Yes  Caregiver Feedback: son  Prior to Session Communication: Bedside nurse  Patient Position Received: Bed, 3 rail up  Preferred Learning Style: verbal  General Comment: Pt agreeable to therapy. Son present to translate.    Subjective   Precautions:  Precautions  LE Weight Bearing Status: Weight Bearing as Tolerated  Medical Precautions: Fall precautions  Post-Surgical Precautions: Right total knee precautions  Vital Signs:       Objective   Pain:  Pain Assessment  Pain Assessment: 0-10  Pain Score: 9  Pain Type: Surgical pain  Pain Location: Knee  Pain Orientation: Right  Cognition:  Cognition  Overall Cognitive Status: Within Functional Limits  Postural Control:     Extremity/Trunk Assessments:    Activity Tolerance:     Treatments:  Therapeutic Exercise  Therapeutic Exercise Performed: Yes  Therapeutic Exercise  Activity 1: Pt performed seated bilat LE ankle pumps/heel raises, LAQ, hip flexion, dima hip adduction, resisted hip abduction x15 reps each. Right LE seated heel slides x15 reps.    Bed Mobility  Bed Mobility: Yes  Bed Mobility 1  Bed Mobility 1: Supine to sitting  Level of Assistance 1: Minimum assistance  Bed Mobility Comments 1: Min assist with trunk up, head of bed elevated.    Ambulation/Gait Training  Ambulation/Gait Training Performed: Yes  Ambulation/Gait Training 1  Surface 1: Level tile  Device 1: Rolling walker  Assistance 1: Minimum assistance  Comments/Distance (ft) 1: Pt ambulated with RW ~45' x2 trials, min assist of 1. Pt initially needed verbal cues for sequencing steps, progressing to ambulating with reciprocal gait. Decreased bilat step length.  Transfers  Transfer: Yes  Transfer 1  Transfer From 1: Bed to  Transfer to 1: Stand  Technique 1: Sit to stand  Transfer Device 1: Walker  Transfer Level of Assistance 1: Minimum assistance, Minimal verbal cues  Trials/Comments 1: Verbal cues to push from bed sit to stand.  Transfers 2  Transfer From 2: Stand to  Transfer to 2: Sit, Chair with arms  Technique 2: Stand to sit  Transfer Level of Assistance 2: Minimum assistance, Minimal verbal cues  Trials/Comments 2: Verbal cues to reach back for chair arms stand to sit.    Outcome Measures:  [facility] Basic Mobility  Turning from your back to your side while in a flat bed without using bedrails: A little  Moving from lying on your back to sitting on the side of a flat bed without using bedrails: A little  Moving to and from bed to chair (including a wheelchair): A little  Standing up from a chair using your arms (e.g. wheelchair or bedside chair): A little  To walk in hospital room: A little  Climbing 3-5 steps with railing: A lot  Basic Mobility - Total Score: 17    Education Documentation  Handouts, taught by Monse Wyatt PTA at 2/20/2024  9:58 AM.  Learner: Patient  Readiness: Acceptance  Method:  Explanation  Response: Verbalizes Understanding, Needs Reinforcement    Precautions, taught by Monse Wyatt PTA at 2/20/2024  9:58 AM.  Learner: Patient  Readiness: Acceptance  Method: Explanation  Response: Verbalizes Understanding, Needs Reinforcement    Body Mechanics, taught by Monse Wyatt PTA at 2/20/2024  9:58 AM.  Learner: Patient  Readiness: Acceptance  Method: Explanation  Response: Verbalizes Understanding, Needs Reinforcement    Home Exercise Program, taught by Monse Wyatt PTA at 2/20/2024  9:58 AM.  Learner: Patient  Readiness: Acceptance  Method: Explanation  Response: Verbalizes Understanding, Needs Reinforcement    Mobility Training, taught by Monse Wyatt PTA at 2/20/2024  9:58 AM.  Learner: Patient  Readiness: Acceptance  Method: Explanation  Response: Verbalizes Understanding, Needs Reinforcement    Education Comments  No comments found.        OP EDUCATION:       Encounter Problems       Encounter Problems (Active)       Balance       dynamic (Progressing)       Start:  02/19/24    Expected End:  02/26/24       No LOB with dynamic activities, supervision and use of walker in standing            Mobility       LTG - Patient will navigate 4-6 steps with rails/device (Progressing)       Start:  02/19/24    Expected End:  02/26/24            ambulation (Progressing)       Start:  02/19/24    Expected End:  02/26/24       Pt will amb >  75 ft with wheeled walker and mod independence             Safety       precautions (Progressing)       Start:  02/19/24    Expected End:  02/26/24       Pt will maintain TKA precautions with all mobility            Transfers       sit to stand (Progressing)       Start:  02/19/24    Expected End:  02/26/24       Pt will perform sit to stand transfer with walker and mod independence.          bed to chair (Progressing)       Start:  02/19/24    Expected End:  02/26/24       Pt will perform bed to chair transfer with walker and mod independence.

## 2024-02-20 NOTE — CARE PLAN
The patient's goals for the shift include  pain control    The clinical goals for the shift include pain control, safety, comfort

## 2024-02-20 NOTE — PROGRESS NOTES
Occupational Therapy    Occupational Therapy Treatment    Name: Alexandria Rai  MRN: [id number]  : 1940  Date: 24  Time Calculation  Start Time:   Stop Time: 9  Time Calculation (min): 31 min    Assessment:  OT Assessment: Pt is 84 y/o female admitted for elective Rt TKA. Pt presents w/ decreased ADL skills/ funcitonal mobility, decreasedactivity tolerance and surgical limitations.REcommend OT services toa ddress above deficits.  Prognosis: Good  Barriers to Discharge: None  End of Session Communication: Bedside nurse  End of Session Patient Position: Bed, 4 rail up, Alarm off, not on at start of session (Son present)  Plan:  Treatment Interventions: ADL retraining, Functional transfer training, Equipment evaluation/education, Patient/family training  OT Frequency: 4 times per week  OT Discharge Recommendations: Low intensity level of continued care  Equipment Recommended upon Discharge: Wheeled walker  OT - OK to Discharge: Yes    Subjective   Previous Visit Info:  OT Last Visit  OT Received On: 24  General:  General  Reason for Referral: recent sx, Rt TKA  Referred By: Dr. Prasad  Past Medical History Relevant to Rehab: arthritis, HLD, HTN, Lt TKA  Family/Caregiver Present: Yes  Caregiver Feedback: Son present to assist w/ translation  Co-Treatment: PT  Co-Treatment Reason: safety w/ mobility  Prior to Session Communication: Bedside nurse  Patient Position Received: Up in chair, Alarm off, not on at start of session  Preferred Learning Style: verbal  General Comment: Pt agreeable to tehrapy  Precautions:  LE Weight Bearing Status: Weight Bearing as Tolerated  Medical Precautions: Fall precautions  Post-Surgical Precautions: Right total knee precautions  Vitals:     Pain Assessment:  Pain Assessment  Pain Assessment: 0-10  Pain Score: 5 - Moderate pain  Pain Type: Surgical pain  Pain Location: Knee  Pain Orientation: Right     Objective   Activities of Daily Living: Grooming  Grooming  Level of Assistance: Setup (min. assist to tie up hair d/t pt limited shdl ROM)  Grooming Where Assessed: Chair  Grooming Comments: to brush teeth, wash face,    UE Bathing  UE Bathing Level of Assistance: Setup  UE Bathing Where Assessed: Other (Comment) (chair)  UE Bathing Comments: to bathe chest, arms, abdomen, periarea    UE Dressing  UE Dressing Level of Assistance: Minimum assistance  UE Dressing Where Assessed: Chair  UE Dressing Comments: to don/ doff gown    LE Dressing  LE Dressing: Yes  LE Dressing Adaptive Equipment: Reacher, Sock aide, Shoe horn  Pants Level of Assistance: Minimum assistance (to don/ doff pants w/ reacher)  Sock Level of Assistance: Minimum assistance (to don/ doff sock w/ LSH, sock aide)  LE Dressing Where Assessed: Chair  LE Dressing Comments: Pt instructed in proper tech for reacher, sock aide and LSH use.    Functional Standing Tolerance:     Bed Mobility/Transfers: Bed Mobility  Bed Mobility: Yes  Bed Mobility 1  Bed Mobility 1: Sitting to supine  Level of Assistance 1: Minimum assistance  Bed Mobility Comments 1: for legs in ,  Bed Mobility 2  Bed Mobility  2: Sitting to supine  Level of Assistance 2: Minimum assistance  Bed Mobility Comments 2: to bring legs up into bed    Transfers  Transfer: Yes  Transfer 1  Transfer From 1: Sit to, Stand to  Transfer to 1: Stand, Sit, Chair with arms  Technique 1: Sit to stand, Stand to sit  Transfer Device 1: Walker  Transfer Level of Assistance 1: Moderate assistance (Verbal cues for hand placement.)  Trials/Comments 1: Pt ambulated from chair to bed w/ min. asist x2. Pt Rt knee buckled during mobility. (x2 trials)  Transfers 2  Transfer From 2: Stand to  Transfer to 2: Sit, Bed  Technique 2: Stand to sit  Transfer Device 2: Walker  Transfer Level of Assistance 2: Minimum assistance  Trials/Comments 2: Verbal cues to reach back for bed.  IADL's:   Communication:     Splinting:     Therapy/Activity:   Sensory:     Cognitive Skill  Development:     Vision:     Strength:  Strength  Strength Comments: RLE grossly 3/5; slight buckling with WBing. LLE 5/5  Other Activity:               Outcome Measures:  [facility] Daily Activity  Putting on and taking off regular lower body clothing: A little  Bathing (including washing, rinsing, drying): A little  Putting on and taking off regular upper body clothing: A little  Toileting, which includes using toilet, bedpan or urinal: A little  Taking care of personal grooming such as brushing teeth: A little  Eating Meals: None  Daily Activity - Total Score: 19        Education Documentation  ADL Training, taught by Nicole Dominguez OT at 2/20/2024 11:57 AM.  Learner: Patient  Readiness: Acceptance  Method: Explanation  Response: Demonstrated Understanding, Needs Reinforcement    ADL Training, taught by Nicole Dominguez OT at 2/19/2024  3:44 PM.  Learner: Patient  Readiness: Acceptance  Method: Demonstration  Response: Needs Reinforcement    Education Comments  No comments found.      Goals:  Encounter Problems       Encounter Problems (Active)       Balance       dynamic (Progressing)       Start:  02/19/24    Expected End:  02/26/24       No LOB with dynamic activities, supervision and use of walker in standing            Mobility       LTG - Patient will navigate 4-6 steps with rails/device (Progressing)       Start:  02/19/24    Expected End:  02/26/24            ambulation (Progressing)       Start:  02/19/24    Expected End:  02/26/24       Pt will amb >  75 ft with wheeled walker and mod independence             OT Goals       ADL's (Progressing)       Start:  02/19/24    Expected End:  03/04/24       Pt will complete ADL tasks w/ close supervision w/ AE as needed for ease, safety and increased independence in self care tasks.         Functional transfers (Progressing)       Start:  02/19/24    Expected End:  03/04/24       Pt will demon. Bed, chair and toilet transfers w/ mod I w/ LRD for safety and  increased independence in functional transfers.         Precautions (Progressing)       Start:  02/19/24    Expected End:  03/04/24       Pt will verbalize/demon surgical precautions for safety and increased independence in self care tasks and functional transfers.            Safety       precautions (Progressing)       Start:  02/19/24    Expected End:  02/26/24       Pt will maintain TKA precautions with all mobility            Transfers       sit to stand (Progressing)       Start:  02/19/24    Expected End:  02/26/24       Pt will perform sit to stand transfer with walker and mod independence.          bed to chair (Progressing)       Start:  02/19/24    Expected End:  02/26/24       Pt will perform bed to chair transfer with walker and mod independence.

## 2024-02-20 NOTE — PROGRESS NOTES
02/20/24 0941   Discharge Planning   Living Arrangements Children   Support Systems Children   Assistance Needed uses cane and walker at home   Number of Stairs to Enter Residence 5   Number of Stairs Within Residence 14   Do you have animals or pets at home? No   Who is requesting discharge planning? Other (Comment)  (son and patient requesting rehab)   Home or Post Acute Services Post acute facilities (Rehab/SNF/etc)   Type of Post Acute Facility Services Skilled nursing   Patient expects to be discharged to: [facility] - checking availability, will need precert   Does the patient need discharge transport arranged? Yes   RoundTrip coordination needed? Yes       PATIENT AND SON WOULD LIKE [facility] - PATIENT WENT THERE AFTER LAST SURGERY.  CHECKING ON AVAILABILITY.  WILL NEED PRECERT

## 2024-02-20 NOTE — PROGRESS NOTES
Alexandria Rai is a 83 y.o. female on day 1 of admission presenting with S/P total knee arthroplasty, right.    Subjective   Seen today.  Worked with physical therapy.  Pain well-controlled.       Objective     Physical Exam  Right lower extremity: Postop dressing clean dry and intact.  No calf tenderness.  Neurovascular intact distally.  Last Recorded Vitals  Blood pressure (!) 93/40, pulse 59, temperature 36.5 °C (97.7 °F), temperature source Temporal, resp. rate 14, height 1.524 m (5'), weight 70.8 kg (156 lb), SpO2 92 %.  Intake/Output last 3 Shifts:  I/O last 3 completed shifts:  In: 2090 (29.5 mL/kg) [P.O.:790; I.V.:1000 (14.1 mL/kg); IV Piggyback:300]  Out: 1200 (17 mL/kg) [Urine:1200 (0.5 mL/kg/hr)]  Weight: 70.8 kg     Relevant Results      Scheduled medications  aspirin, 81 mg, oral, BID  atenolol, 25 mg, oral, Daily  dilTIAZem CD, 120 mg, oral, BID  docusate sodium, 100 mg, oral, q12h  loratadine, 10 mg, oral, Daily  oxygen, 2 L/min, inhalation, q8h  polyethylene glycol, 17 g, oral, Daily      Continuous medications  lactated Ringer's, 100 mL/hr, Last Rate: 125 mL/hr (02/19/24 0708)  sodium chloride 0.9%, 100 mL/hr, Last Rate: Stopped (02/20/24 0044)      PRN medications  PRN medications: acetaminophen, HYDROcodone-acetaminophen, HYDROcodone-acetaminophen, naloxone, naloxone, naloxone, ondansetron ODT **OR** ondansetron  Results for orders placed or performed during the hospital encounter of 02/19/24 (from the past 24 hour(s))   CBC   Result Value Ref Range    WBC 9.2 4.4 - 11.3 x10*3/uL    nRBC 0.0 0.0 - 0.0 /100 WBCs    RBC 4.07 4.00 - 5.20 x10*6/uL    Hemoglobin 10.7 (L) 12.0 - 16.0 g/dL    Hematocrit 33.2 (L) 36.0 - 46.0 %    MCV 82 80 - 100 fL    MCH 26.3 26.0 - 34.0 pg    MCHC 32.2 32.0 - 36.0 g/dL    RDW 15.1 (H) 11.5 - 14.5 %    Platelets 261 150 - 450 x10*3/uL   Basic Metabolic Panel   Result Value Ref Range    Glucose 133 (H) 65 - 99 mg/dL    Sodium 134 133 - 145 mmol/L    Potassium 3.7 3.4 -  5.1 mmol/L    Chloride 99 97 - 107 mmol/L    Bicarbonate 27 24 - 31 mmol/L    Urea Nitrogen 18 8 - 25 mg/dL    Creatinine 0.70 0.40 - 1.60 mg/dL    eGFR 86 >60 mL/min/1.73m*2    Calcium 8.5 8.5 - 10.4 mg/dL    Anion Gap 8 <=19 mmol/L   Iron and TIBC   Result Value Ref Range    Iron 27 (L) 30 - 160 ug/dL    UIBC 205 110 - 370 ug/dL    TIBC 232 228 - 428 ug/dL    % Saturation 12 12 - 50 %                            Assessment/Plan   Principal Problem:    S/P total knee arthroplasty, right  Active Problems:    Hyperlipidemia    S/P total knee arthroplasty, left    Osteoarthritis    Primary hypertension    Postop day 1 from right total knee arthroplasty.  Plan for physical therapy for mobilization and strengthening.  DVT prophylaxis for 30 days.  Pain control.  Upon discharge she will follow-up in the office in 2 weeks.  Plan for discharge to SNF for rehab           Declan Prasad MD

## 2024-02-20 NOTE — PROGRESS NOTES
Physical Therapy    Physical Therapy Treatment    Patient Name: Alexandria Rai  MRN: [id number]  Today's Date: 2/20/2024  Time Calculation  Start Time: 1301  Stop Time: 1330  Time Calculation (min): 29 min       Assessment/Plan   PT Assessment  PT Assessment Results: Decreased strength, Decreased range of motion, Decreased endurance, Impaired balance, Decreased mobility, Decreased safety awareness, Orthopedic restrictions, Impaired judgement  Rehab Prognosis: Good  End of Session Communication: Bedside nurse, Care Coordinator  End of Session Patient Position: Up in chair, Alarm off, caregiver present  PT Plan  Inpatient/Swing Bed or Outpatient: Inpatient  PT Plan  Treatment/Interventions: Bed mobility, Transfer training, Gait training, Therapeutic exercise  PT Plan: Skilled PT  PT Frequency: BID  PT Discharge Recommendations: Low intensity level of continued care  Equipment Recommended upon Discharge: Wheeled walker  PT Recommended Transfer Status: Assist x1, Assistive device  PT - OK to Discharge: Yes      General Visit Information:   PT  Visit  PT Received On: 02/20/24  General  Family/Caregiver Present: Yes  Caregiver Feedback: son  Prior to Session Communication: Bedside nurse  Patient Position Received: Bed, 3 rail up  Preferred Learning Style: verbal, visual  General Comment: Pt agreeable to therapy. Son present to translate.    Subjective   Precautions:  Precautions  LE Weight Bearing Status: Weight Bearing as Tolerated  Medical Precautions: Fall precautions  Post-Surgical Precautions: Right total knee precautions  Vital Signs:       Objective   Pain:  Pain Assessment  Pain Assessment: 0-10  Pain Score: 6  Pain Type: Surgical pain  Pain Location: Knee  Pain Orientation: Right  Cognition:  Cognition  Overall Cognitive Status: Within Functional Limits  Postural Control:     Extremity/Trunk Assessments:    Activity Tolerance:     Treatments:  Therapeutic Exercise  Therapeutic Exercise Performed: Yes  Therapeutic  Exercise Activity 1: Pt performed seated bilat LE ankle pumps/heel raises, LAQ, hip flexion, dima hip adduction, resisted hip abduction x15 reps each. Right LE seated heel slides x15 reps.    Bed Mobility  Bed Mobility: Yes  Bed Mobility 1  Bed Mobility 1: Supine to sitting  Level of Assistance 1: Minimum assistance  Bed Mobility Comments 1: Head of bed elevated for supine to sitting.    Ambulation/Gait Training  Ambulation/Gait Training Performed: Yes  Ambulation/Gait Training 1  Surface 1: Level tile  Device 1: Rolling walker  Assistance 1: Minimum assistance  Comments/Distance (ft) 1: Pt ambulated with RW ~15' to bathroom min assist. Pt ambulated with RW ~40' x2 in hallway min assist. Noted right knee soft at times but no actual buckling.  Transfers  Transfer: Yes  Transfer 1  Transfer From 1: Bed to  Transfer to 1: Stand  Technique 1: Sit to stand  Transfer Device 1: Walker  Transfer Level of Assistance 1: Minimum assistance  Trials/Comments 1: Verbal cues to push from bed sit to stand.  Transfers 2  Transfer From 2: Stand to  Transfer to 2: Sit, Toilet  Technique 2: Stand to sit  Transfer Level of Assistance 2: Minimum assistance, Minimal verbal cues  Trials/Comments 2: Visual and tactile cues for safe hand placement.  Transfers 3  Transfer From 3: Toilet to  Transfer to 3: Stand  Technique 3: Sit to stand  Transfer Device 3: Walker  Transfer Level of Assistance 3: Minimum assistance  Transfers 4  Transfer From 4: Stand to  Transfer to 4: Sit, Chair with arms  Technique 4: Stand to sit  Transfer Level of Assistance 4: Minimum assistance    Outcome Measures:  [facility] Basic Mobility  Turning from your back to your side while in a flat bed without using bedrails: A little  Moving from lying on your back to sitting on the side of a flat bed without using bedrails: A little  Moving to and from bed to chair (including a wheelchair): A little  Standing up from a chair using your arms (e.g. wheelchair or bedside chair):  A little  To walk in hospital room: A little  Climbing 3-5 steps with railing: A lot  Basic Mobility - Total Score: 17    Education Documentation  Handouts, taught by Monse Wyatt PTA at 2/20/2024  3:24 PM.  Learner: Patient  Readiness: Acceptance  Method: Explanation  Response: Verbalizes Understanding    Precautions, taught by Monse Wyatt PTA at 2/20/2024  3:24 PM.  Learner: Patient  Readiness: Acceptance  Method: Explanation  Response: Verbalizes Understanding    Body Mechanics, taught by Monse Wyatt PTA at 2/20/2024  3:24 PM.  Learner: Patient  Readiness: Acceptance  Method: Explanation  Response: Verbalizes Understanding    Home Exercise Program, taught by Monse Wyatt PTA at 2/20/2024  3:24 PM.  Learner: Patient  Readiness: Acceptance  Method: Explanation  Response: Verbalizes Understanding    Mobility Training, taught by Monse Wyatt PTA at 2/20/2024  3:24 PM.  Learner: Patient  Readiness: Acceptance  Method: Explanation  Response: Verbalizes Understanding    Education Comments  No comments found.        OP EDUCATION:       Encounter Problems       Encounter Problems (Active)       Balance       dynamic (Progressing)       Start:  02/19/24    Expected End:  02/26/24       No LOB with dynamic activities, supervision and use of walker in standing            Mobility       LTG - Patient will navigate 4-6 steps with rails/device (Progressing)       Start:  02/19/24    Expected End:  02/26/24            ambulation (Progressing)       Start:  02/19/24    Expected End:  02/26/24       Pt will amb >  75 ft with wheeled walker and mod independence             Safety       precautions (Progressing)       Start:  02/19/24    Expected End:  02/26/24       Pt will maintain TKA precautions with all mobility            Transfers       sit to stand (Progressing)       Start:  02/19/24    Expected End:  02/26/24       Pt will perform sit to stand transfer with walker and mod independence.          bed to chair  (Progressing)       Start:  02/19/24    Expected End:  02/26/24       Pt will perform bed to chair transfer with walker and mod independence.

## 2024-02-20 NOTE — PROGRESS NOTES
Alexandria Rai is a 83 y.o. female on day 1 of admission presenting with No Principal Problem: There is no principal problem currently on the Problem List. Please update the Problem List and refresh..      Subjective   Pt seen and examined.  Sitting up in chair working with PT.  Son at bedside and translates.  Pt denies any chest pain, dyspnea, palpitations.  Does c/o 8/10 RT knee pain       Objective     Last Recorded Vitals  /55 (BP Location: Right arm, Patient Position: Lying)   Pulse 61   Temp 36.3 °C (97.3 °F) (Temporal)   Resp 16   Wt 70.8 kg (156 lb)   SpO2 94%   Intake/Output last 3 Shifts:    Intake/Output Summary (Last 24 hours) at 2/20/2024 1035  Last data filed at 2/20/2024 1011  Gross per 24 hour   Intake 1450 ml   Output 1500 ml   Net -50 ml       Admission Weight  Weight: 70.8 kg (156 lb) (02/19/24 0615)    Daily Weight  02/19/24 : 70.8 kg (156 lb)    Image Results  XR knee right 1-2 views  Narrative: Interpreted By:  Ramiro Connolly,   STUDY:  XR KNEE RIGHT 1-2 VIEWS; 2/19/2024 10:50 am      INDICATION:  Signs/Symptoms:Post op knee.      COMPARISON:  06/01/2022      ACCESSION NUMBER(S):  [id number]      ORDERING CLINICIAN:  JEREMIAS KINNEY      TECHNIQUE:  AP and lateral views of the right knee.      FINDINGS:  Expected postoperative changes of right total knee prosthesis.  Femoral, retropatellar, and tibial components appear within normal  limits. Expected postoperative changes of the soft tissues. No  abnormal radiopaque foreign body.      Impression: Expected postoperative changes of right total knee prosthesis.      Signed by: Ramiro Connolly 2/19/2024 11:58 AM  Dictation workstation:   [id number]      Physical Exam  Constitutional:       General: She is not in acute distress.     Appearance: Normal appearance. She is not ill-appearing or toxic-appearing.   Cardiovascular:      Rate and Rhythm: Normal rate and regular rhythm.      Pulses: Normal pulses.      Heart sounds: Normal  heart sounds.   Pulmonary:      Effort: Pulmonary effort is normal.      Breath sounds: Normal breath sounds.   Abdominal:      General: Bowel sounds are normal.      Palpations: Abdomen is soft.   Skin:     General: Skin is warm and dry.      Comments: Drsg RT knee dry and intact    Neurological:      General: No focal deficit present.      Mental Status: She is alert and oriented to person, place, and time.         Relevant Results  Scheduled medications  aspirin, 81 mg, oral, BID  atenolol, 25 mg, oral, Daily  dilTIAZem CD, 120 mg, oral, BID  docusate sodium, 100 mg, oral, q12h  loratadine, 10 mg, oral, Daily  oxygen, 2 L/min, inhalation, q8h  polyethylene glycol, 17 g, oral, Daily      Continuous medications  lactated Ringer's, 100 mL/hr, Last Rate: 125 mL/hr (02/19/24 0708)  sodium chloride 0.9%, 100 mL/hr, Last Rate: Stopped (02/20/24 0044)      PRN medications  PRN medications: acetaminophen, HYDROcodone-acetaminophen, HYDROcodone-acetaminophen, naloxone, naloxone, naloxone, ondansetron ODT **OR** ondansetron     following data reviewed    WBC- 9.2  Hgb-10.7  Hct-33.2  Platelets-261      Na-134  K+-3.7  Cl-99  Bicarb-27  BUN-18  creatinine-0.7        Assessment/Plan                  Active Problems:    Hyperlipidemia    S/P total knee arthroplasty, left    Osteoarthritis    Primary hypertension    HTN  -Monitor blood pressure  -Continue atenolol and Cardizem with parameters  -Hold chlorthalidone     RT TKA  -DVT prophylaxis per orthopedic surgeon  -Pain control  -PT, OT     Plan  Plan discussed at length with patient she is in agreement  Son updated and translated to pt               BIB Choi-CNP

## 2024-02-20 NOTE — DISCHARGE SUMMARY
Discharge Diagnosis  S/P total knee arthroplasty, right    Issues Requiring Follow-Up  Status post right total knee arthroplasty    Test Results Pending At Discharge  Pending Labs       No current pending labs.            Hospital Course   Worked with physical therapy.  Pain controlled with oral pain meds.  Plan for discharge discussed skilled nursing facility for inpatient rehab.    Pertinent Physical Exam At Time of Discharge  Physical Exam  Right lower extremity: Postop dressing clean dry and intact.  No calf tenderness.  Neurovascular intact distally.  Home Medications     Medication List      START taking these medications     HYDROcodone-acetaminophen 5-325 mg tablet; Commonly known as: Norco;   Take 1 tablet by mouth every 6 hours if needed for moderate pain (4 - 6).     CHANGE how you take these medications     aspirin 81 mg EC tablet; Take 1 tablet (81 mg) by mouth 2 times a day.;   What changed: when to take this     CONTINUE taking these medications     atenoloL-chlorthalidone 50-25 mg tablet; Commonly known as: Tenoretic   50; Take 0.5 tablet by mouth once daily.   dilTIAZem  mg 24 hr capsule; Commonly known as: Cardizem CD; Take   1 capsule (120 mg) by mouth 2 times a day. HOLD IF HEART RATE IS LESS THAN   50 BEATS PER MINUTE   docusate sodium 100 mg capsule; Commonly known as: Colace   ergocalciferol 1.25 MG (23085 UT) capsule; Commonly known as: Vitamin   D-2; Take 1 capsule (50,000 Units) by mouth 1 (one) time per week.   loratadine 10 mg tablet; Commonly known as: Claritin; Take 1 tablet (10   mg) by mouth once daily.     STOP taking these medications     Neha-Hex 4 % external liquid; Generic drug: chlorhexidine   nitrofurantoin (macrocrystal-monohydrate) 100 mg capsule; Commonly known   as: Macrobid       Outpatient Follow-Up  Future Appointments   Date Time Provider Department Center   3/13/2024  4:15 PM Darnell Torres, PT [id number]   7/31/2024  9:45 AM Daniel Sanches MD  [id number] [facility]       Declan Prasad MD

## 2024-02-20 NOTE — CONSULTS
Consults-blood management    Reason For Consult  Post op anemia    History Of Present Illness  Alexandria Rai is a 83 y.o. female presenting with OA right knee, she is 1 day s/p right TKA. Hx left TKA  10/23/23 with good outcome. Pt hx bilat knee pain for years, she had difficulty with daily activities and failed conservative management.  Preop H/H stable 13.4/42.1, post op H/H 10.7/33.2. post op iron panel shows borderline SHIRA.       Past Medical History  She has a past medical history of Arthritis, Drug induced constipation, Hyperlipidemia, Hypertension, Joint pain, Osteoarthritis of both knees, Sciatica, and Vitamin D deficiency.    Surgical History  She has a past surgical history that includes Vaginal prolapse repair; Cataract extraction; and Knee Arthroplasty (Left, 10/23/2023).     Social History  She reports that she has never smoked. She has never been exposed to tobacco smoke. She has never used smokeless tobacco. She reports that she does not drink alcohol and does not use drugs.    Family History  Family History   Problem Relation Name Age of Onset    Diabetes Mother      Arthritis Mother          Allergies  Patient has no known allergies.    Review of Systems   Constitutional:  Negative for chills and fever.        Pt Pashto, speaks/understands limited English, appears oriented X3.    HENT:  Negative for congestion.    Respiratory:  Negative for cough and shortness of breath.    Cardiovascular:  Negative for chest pain.   Gastrointestinal:  Positive for constipation (chronic drug induced constipation, takes colace bid). Negative for abdominal pain and nausea.   Endocrine: Negative for cold intolerance and heat intolerance.   Genitourinary:  Negative for dysuria.   Musculoskeletal:  Positive for arthralgias.   Neurological:  Positive for dizziness (mild dizzy when ambulating with therapy this morning).   Psychiatric/Behavioral:  Negative for agitation and confusion.         Physical  Exam  Constitutional:       Appearance: Normal appearance.   HENT:      Head: Normocephalic and atraumatic.      Right Ear: External ear normal.      Left Ear: External ear normal.      Nose: Nose normal.      Mouth/Throat:      Mouth: Mucous membranes are moist.   Eyes:      Conjunctiva/sclera: Conjunctivae normal.      Pupils: Pupils are equal, round, and reactive to light.   Cardiovascular:      Rate and Rhythm: Normal rate and regular rhythm.      Heart sounds: Normal heart sounds. No murmur heard.  Pulmonary:      Effort: Pulmonary effort is normal. No respiratory distress.      Breath sounds: Normal breath sounds. No wheezing, rhonchi or rales.   Chest:      Chest wall: No tenderness.   Abdominal:      General: Bowel sounds are normal. There is no distension.      Palpations: Abdomen is soft.      Tenderness: There is no abdominal tenderness.   Musculoskeletal:         General: Tenderness present.      Cervical back: Normal range of motion and neck supple.      Right lower leg: No edema.      Left lower leg: No edema.   Skin:     General: Skin is warm and dry.      Capillary Refill: Capillary refill takes 2 to 3 seconds.      Coloration: Skin is not pale.      Findings: No bruising, erythema, lesion or rash.      Comments: Ace wrap right leg dry/intact   Neurological:      General: No focal deficit present.      Mental Status: She is alert and oriented to person, place, and time.   Psychiatric:         Mood and Affect: Mood normal.         Behavior: Behavior normal.          Last Recorded Vitals  Blood pressure 120/55, pulse 61, temperature 36.3 °C (97.3 °F), temperature source Temporal, resp. rate 16, height 1.524 m (5'), weight 70.8 kg (156 lb), SpO2 94 %.    Relevant Results  Results for orders placed or performed during the hospital encounter of 02/19/24 (from the past 24 hour(s))   CBC   Result Value Ref Range    WBC 9.2 4.4 - 11.3 x10*3/uL    nRBC 0.0 0.0 - 0.0 /100 WBCs    RBC 4.07 4.00 - 5.20 x10*6/uL     Hemoglobin 10.7 (L) 12.0 - 16.0 g/dL    Hematocrit 33.2 (L) 36.0 - 46.0 %    MCV 82 80 - 100 fL    MCH 26.3 26.0 - 34.0 pg    MCHC 32.2 32.0 - 36.0 g/dL    RDW 15.1 (H) 11.5 - 14.5 %    Platelets 261 150 - 450 x10*3/uL   Basic Metabolic Panel   Result Value Ref Range    Glucose 133 (H) 65 - 99 mg/dL    Sodium 134 133 - 145 mmol/L    Potassium 3.7 3.4 - 5.1 mmol/L    Chloride 99 97 - 107 mmol/L    Bicarbonate 27 24 - 31 mmol/L    Urea Nitrogen 18 8 - 25 mg/dL    Creatinine 0.70 0.40 - 1.60 mg/dL    eGFR 86 >60 mL/min/1.73m*2    Calcium 8.5 8.5 - 10.4 mg/dL    Anion Gap 8 <=19 mmol/L   Iron and TIBC   Result Value Ref Range    Iron 27 (L) 30 - 160 ug/dL    UIBC 205 110 - 370 ug/dL    TIBC 232 228 - 428 ug/dL    % Saturation 12 12 - 50 %      Current Facility-Administered Medications:     acetaminophen (Tylenol) tablet 650 mg, 650 mg, oral, q4h PRN, Declan Prasad MD    aspirin EC tablet 81 mg, 81 mg, oral, BID, Declan Prasad MD, 81 mg at 02/20/24 0900    atenolol (Tenormin) tablet 25 mg, 25 mg, oral, Daily, 25 mg at 02/20/24 0527 **AND** [DISCONTINUED] chlorthalidone (Hygroton) tablet 12.5 mg, 12.5 mg, oral, Daily, Chon Lewis, PharmD, 12.5 mg at 02/19/24 1308    dilTIAZem CD (Cardizem CD) 24 hr capsule 120 mg, 120 mg, oral, BID, Jenna Doyle, APRN-CNP, 120 mg at 02/20/24 0832    docusate sodium (Colace) capsule 100 mg, 100 mg, oral, q12h, Declan Prasad MD, 100 mg at 02/20/24 1209    HYDROcodone-acetaminophen (Norco) 5-325 mg per tablet 1 tablet, 1 tablet, oral, q4h PRN, Declan Prasda MD, 1 tablet at 02/20/24 1209    HYDROcodone-acetaminophen (Norco) 5-325 mg per tablet 2 tablet, 2 tablet, oral, q4h PRN, Declan Prasad MD, 2 tablet at 02/20/24 0832    lactated Ringer's infusion, 100 mL/hr, intravenous, Continuous, Declan Prasad MD, Last Rate: 125 mL/hr at 02/19/24 0708, New Bag at 02/19/24 0708    loratadine (Claritin) tablet 10 mg, 10 mg, oral, Daily, Declan Prasad MD, 10 mg at  02/20/24 0833    naloxone (Narcan) injection 0.2 mg, 0.2 mg, intravenous, q5 min PRN, Declan Prasad MD    naloxone (Narcan) injection 0.2 mg, 0.2 mg, intravenous, q5 min PRN, Declan Prasad MD    naloxone (Narcan) injection 0.2 mg, 0.2 mg, intravenous, q5 min PRN, Declan Prasad MD    ondansetron ODT (Zofran-ODT) disintegrating tablet 4 mg, 4 mg, oral, q8h PRN **OR** ondansetron (Zofran) injection 4 mg, 4 mg, intravenous, q8h PRN, Declan Prasad MD    oxygen (O2) therapy, 2 L/min, inhalation, q8h, Declan Prasad MD, 21 percent at 02/20/24 0800    polyethylene glycol (Glycolax, Miralax) packet 17 g, 17 g, oral, Daily, Declan Prasad MD, 17 g at 02/20/24 0832      Assessment/Plan   OA right knee  S/p right TKA  HTN  Post op normocytic anemia  Anemia of acute blood loss  Venofer 200mg IV X1  Monitor H/H

## 2024-02-21 VITALS
OXYGEN SATURATION: 92 % | WEIGHT: 156 LBS | HEART RATE: 60 BPM | SYSTOLIC BLOOD PRESSURE: 109 MMHG | BODY MASS INDEX: 30.63 KG/M2 | HEIGHT: 60 IN | RESPIRATION RATE: 16 BRPM | TEMPERATURE: 98.8 F | DIASTOLIC BLOOD PRESSURE: 44 MMHG

## 2024-02-21 PROCEDURE — 2500000001 HC RX 250 WO HCPCS SELF ADMINISTERED DRUGS (ALT 637 FOR MEDICARE OP): Performed by: STUDENT IN AN ORGANIZED HEALTH CARE EDUCATION/TRAINING PROGRAM

## 2024-02-21 PROCEDURE — 99232 SBSQ HOSP IP/OBS MODERATE 35: CPT | Performed by: NURSE PRACTITIONER

## 2024-02-21 PROCEDURE — 2500000001 HC RX 250 WO HCPCS SELF ADMINISTERED DRUGS (ALT 637 FOR MEDICARE OP): Performed by: NURSE PRACTITIONER

## 2024-02-21 PROCEDURE — 97110 THERAPEUTIC EXERCISES: CPT | Mod: GP,CQ

## 2024-02-21 PROCEDURE — 97116 GAIT TRAINING THERAPY: CPT | Mod: GP,CQ

## 2024-02-21 PROCEDURE — 2500000005 HC RX 250 GENERAL PHARMACY W/O HCPCS: Performed by: STUDENT IN AN ORGANIZED HEALTH CARE EDUCATION/TRAINING PROGRAM

## 2024-02-21 PROCEDURE — 7100000011 HC EXTENDED STAY RECOVERY HOURLY - NURSING UNIT

## 2024-02-21 PROCEDURE — 2500000004 HC RX 250 GENERAL PHARMACY W/ HCPCS (ALT 636 FOR OP/ED): Performed by: STUDENT IN AN ORGANIZED HEALTH CARE EDUCATION/TRAINING PROGRAM

## 2024-02-21 PROCEDURE — 2500000004 HC RX 250 GENERAL PHARMACY W/ HCPCS (ALT 636 FOR OP/ED): Performed by: NURSE PRACTITIONER

## 2024-02-21 RX ADMIN — HYDROCODONE BITARTRATE AND ACETAMINOPHEN 2 TABLET: 5; 325 TABLET ORAL at 02:57

## 2024-02-21 RX ADMIN — DILTIAZEM HYDROCHLORIDE 120 MG: 120 CAPSULE, EXTENDED RELEASE ORAL at 09:10

## 2024-02-21 RX ADMIN — POLYETHYLENE GLYCOL 3350 17 G: 17 POWDER, FOR SOLUTION ORAL at 09:09

## 2024-02-21 RX ADMIN — ATENOLOL 25 MG: 25 TABLET ORAL at 05:28

## 2024-02-21 RX ADMIN — Medication 2 L/MIN: at 00:00

## 2024-02-21 RX ADMIN — HYDROCODONE BITARTRATE AND ACETAMINOPHEN 2 TABLET: 5; 325 TABLET ORAL at 09:09

## 2024-02-21 RX ADMIN — LORATADINE 10 MG: 10 TABLET ORAL at 09:11

## 2024-02-21 RX ADMIN — DOCUSATE SODIUM 100 MG: 100 CAPSULE, LIQUID FILLED ORAL at 00:46

## 2024-02-21 RX ADMIN — HYDROCODONE BITARTRATE AND ACETAMINOPHEN 2 TABLET: 5; 325 TABLET ORAL at 15:09

## 2024-02-21 RX ADMIN — IRON SUCROSE 200 MG: 20 INJECTION, SOLUTION INTRAVENOUS at 10:47

## 2024-02-21 RX ADMIN — ASPIRIN 81 MG: 81 TABLET, COATED ORAL at 09:10

## 2024-02-21 ASSESSMENT — COGNITIVE AND FUNCTIONAL STATUS - GENERAL
MOVING FROM LYING ON BACK TO SITTING ON SIDE OF FLAT BED WITH BEDRAILS: A LITTLE
PERSONAL GROOMING: A LITTLE
STANDING UP FROM CHAIR USING ARMS: A LITTLE
DAILY ACTIVITIY SCORE: 19
MOVING TO AND FROM BED TO CHAIR: A LITTLE
MOBILITY SCORE: 17
WALKING IN HOSPITAL ROOM: A LITTLE
TURNING FROM BACK TO SIDE WHILE IN FLAT BAD: A LITTLE
MOBILITY SCORE: 17
TURNING FROM BACK TO SIDE WHILE IN FLAT BAD: A LITTLE
MOVING TO AND FROM BED TO CHAIR: A LITTLE
DRESSING REGULAR UPPER BODY CLOTHING: A LITTLE
TURNING FROM BACK TO SIDE WHILE IN FLAT BAD: A LITTLE
WALKING IN HOSPITAL ROOM: A LITTLE
MOVING FROM LYING ON BACK TO SITTING ON SIDE OF FLAT BED WITH BEDRAILS: A LITTLE
STANDING UP FROM CHAIR USING ARMS: A LITTLE
MOVING TO AND FROM BED TO CHAIR: A LITTLE
MOVING FROM LYING ON BACK TO SITTING ON SIDE OF FLAT BED WITH BEDRAILS: A LITTLE
CLIMB 3 TO 5 STEPS WITH RAILING: A LOT
CLIMB 3 TO 5 STEPS WITH RAILING: A LOT
STANDING UP FROM CHAIR USING ARMS: A LITTLE
WALKING IN HOSPITAL ROOM: A LITTLE
DRESSING REGULAR LOWER BODY CLOTHING: A LITTLE
MOBILITY SCORE: 17
HELP NEEDED FOR BATHING: A LITTLE
CLIMB 3 TO 5 STEPS WITH RAILING: A LOT
TOILETING: A LITTLE

## 2024-02-21 ASSESSMENT — PAIN DESCRIPTION - ORIENTATION
ORIENTATION: RIGHT

## 2024-02-21 ASSESSMENT — PAIN - FUNCTIONAL ASSESSMENT
PAIN_FUNCTIONAL_ASSESSMENT: 0-10
PAIN_FUNCTIONAL_ASSESSMENT: WONG-BAKER FACES
PAIN_FUNCTIONAL_ASSESSMENT: 0-10

## 2024-02-21 ASSESSMENT — PAIN DESCRIPTION - LOCATION
LOCATION: KNEE

## 2024-02-21 ASSESSMENT — PAIN SCALES - GENERAL
PAINLEVEL_OUTOF10: 0 - NO PAIN
PAINLEVEL_OUTOF10: 7
PAINLEVEL_OUTOF10: 10 - WORST POSSIBLE PAIN
PAINLEVEL_OUTOF10: 7
PAINLEVEL_OUTOF10: 10 - WORST POSSIBLE PAIN
PAINLEVEL_OUTOF10: 3
PAINLEVEL_OUTOF10: 4
PAINLEVEL_OUTOF10: 6

## 2024-02-21 ASSESSMENT — PAIN SCALES - WONG BAKER: WONGBAKER_NUMERICALRESPONSE: NO HURT

## 2024-02-21 NOTE — PROGRESS NOTES
Initial transport sent an [facility].  Cancelled that.    Called Suzette and arranged transport by [facility] for 5pm.    Nga Worley RN

## 2024-02-21 NOTE — PROGRESS NOTES
Physical Therapy    Physical Therapy Treatment    Patient Name: Alexandria Rai  MRN: [id number]  Today's Date: 2/21/2024  Time Calculation  Start Time: 0915  Stop Time: 0948  Time Calculation (min): 33 min       Assessment/Plan   PT Assessment  PT Assessment Results: Decreased strength, Decreased range of motion, Decreased endurance, Impaired balance, Decreased mobility, Decreased safety awareness, Orthopedic restrictions, Impaired judgement  Rehab Prognosis: Good  End of Session Patient Position: Up in chair, Alarm off, not on at start of session  PT Plan  Inpatient/Swing Bed or Outpatient: Inpatient  PT Plan  Treatment/Interventions: Bed mobility, Transfer training, Gait training, Therapeutic exercise  PT Plan: Skilled PT  PT Frequency: BID  PT Discharge Recommendations: Low intensity level of continued care  Equipment Recommended upon Discharge: Wheeled walker  PT Recommended Transfer Status: Assist x1, Assistive device  PT - OK to Discharge: Yes      General Visit Information:   PT  Visit  PT Received On: 02/21/24  General  Family/Caregiver Present: No  Caregiver Feedback: son  Prior to Session Communication: Bedside nurse  Patient Position Received: Bed, 3 rail up  Preferred Learning Style: verbal, visual  General Comment: Pt agreeable to therapy    Subjective   Precautions:  Precautions  LE Weight Bearing Status: Weight Bearing as Tolerated  Medical Precautions: Fall precautions  Post-Surgical Precautions: Right total knee precautions  Vital Signs:       Objective   Pain:  Pain Assessment  Pain Assessment: 0-10  Pain Score: 10 - Worst possible pain  Pain Type: Surgical pain  Pain Location: Knee  Pain Orientation: Right  Pain Interventions: Cold pack  Cognition:  Cognition  Overall Cognitive Status: Within Functional Limits  Postural Control:     Extremity/Trunk Assessments:    Activity Tolerance:     Treatments:  Therapeutic Exercise  Therapeutic Exercise Performed: Yes  Therapeutic Exercise Activity 1: Pt  performed seated bilat LE ankle pumps/heel raises, LAQ, hip flexion, dima hip adduction, resisted hip abduction x15 reps each. Right LE seated heel slides x15 reps.    Bed Mobility  Bed Mobility: Yes  Bed Mobility 1  Bed Mobility 1: Supine to sitting  Level of Assistance 1: Minimum assistance  Bed Mobility Comments 1: Min assist with right LE off bed    Ambulation/Gait Training  Ambulation/Gait Training Performed: Yes  Ambulation/Gait Training 1  Surface 1: Level tile  Device 1: Rolling walker  Assistance 1: Contact guard  Comments/Distance (ft) 1: Pt ambulated with RW ~15' x1 and 45' x2 CGA. Pt demonstrated slow, reciprocal gait. Decreased bilat step length. Mod antalgic gait.  Transfers  Transfer: Yes  Transfer 1  Transfer From 1: Bed to  Transfer to 1: Stand  Technique 1: Sit to stand  Transfer Device 1: Walker  Transfer Level of Assistance 1: Minimum assistance  Transfers 2  Transfer From 2: Stand to  Transfer to 2: Sit, Toilet  Technique 2: Stand to sit  Transfer Level of Assistance 2: Minimum assistance  Trials/Comments 2: Visual and tactile cues for safe hand placement.  Transfers 3  Transfer From 3: Toilet to  Transfer to 3: Stand  Technique 3: Sit to stand  Transfer Device 3: Walker  Transfer Level of Assistance 3: Moderate assistance  Transfers 4  Transfer From 4: Stand to  Transfer to 4: Sit, Chair with arms  Technique 4: Stand to sit  Transfer Level of Assistance 4: Contact guard    Outcome Measures:  [facility] Basic Mobility  Turning from your back to your side while in a flat bed without using bedrails: A little  Moving from lying on your back to sitting on the side of a flat bed without using bedrails: A little  Moving to and from bed to chair (including a wheelchair): A little  Standing up from a chair using your arms (e.g. wheelchair or bedside chair): A little  To walk in hospital room: A little  Climbing 3-5 steps with railing: A lot  Basic Mobility - Total Score: 17    Education  Documentation  Handouts, taught by Monse Wyatt PTA at 2/21/2024  9:57 AM.  Learner: Patient  Readiness: Acceptance  Method: Explanation  Response: Verbalizes Understanding    Precautions, taught by Monse Wyatt PTA at 2/21/2024  9:57 AM.  Learner: Patient  Readiness: Acceptance  Method: Explanation  Response: Verbalizes Understanding    Body Mechanics, taught by Monse Wyatt PTA at 2/21/2024  9:57 AM.  Learner: Patient  Readiness: Acceptance  Method: Explanation  Response: Verbalizes Understanding    Home Exercise Program, taught by Monse Wyatt PTA at 2/21/2024  9:57 AM.  Learner: Patient  Readiness: Acceptance  Method: Explanation  Response: Verbalizes Understanding    Mobility Training, taught by Monse Wyatt PTA at 2/21/2024  9:57 AM.  Learner: Patient  Readiness: Acceptance  Method: Explanation  Response: Verbalizes Understanding    Education Comments  No comments found.        OP EDUCATION:       Encounter Problems       Encounter Problems (Active)       Balance       dynamic (Progressing)       Start:  02/19/24    Expected End:  02/26/24       No LOB with dynamic activities, supervision and use of walker in standing            Mobility       LTG - Patient will navigate 4-6 steps with rails/device (Progressing)       Start:  02/19/24    Expected End:  02/26/24            ambulation (Progressing)       Start:  02/19/24    Expected End:  02/26/24       Pt will amb >  75 ft with wheeled walker and mod independence             Safety       precautions (Progressing)       Start:  02/19/24    Expected End:  02/26/24       Pt will maintain TKA precautions with all mobility            Transfers       sit to stand (Progressing)       Start:  02/19/24    Expected End:  02/26/24       Pt will perform sit to stand transfer with walker and mod independence.          bed to chair (Progressing)       Start:  02/19/24    Expected End:  02/26/24       Pt will perform bed to chair transfer with walker and mod  independence.

## 2024-02-21 NOTE — PROGRESS NOTES
Alexandria Rai is a 83 y.o. female on day 1 of admission presenting with S/P total knee arthroplasty, right.    Subjective   Seen today.  Worked with physical therapy.  Pain well-controlled.  Plan for discharge to SNF today       Objective     Physical Exam  Right lower extremity: Postop dressing clean dry and intact.  No calf tenderness.  Neurovascular intact distally.  Last Recorded Vitals  Blood pressure (!) 109/44, pulse 60, temperature 37.1 °C (98.8 °F), temperature source Temporal, resp. rate 16, height 1.524 m (5'), weight 70.8 kg (156 lb), SpO2 92 %.  Intake/Output last 3 Shifts:  I/O last 3 completed shifts:  In: 1790 (25.3 mL/kg) [P.O.:1580; IV Piggyback:210]  Out: 1450 (20.5 mL/kg) [Urine:1450 (0.6 mL/kg/hr)]  Weight: 70.8 kg     Relevant Results      Scheduled medications  aspirin, 81 mg, oral, BID  atenolol, 25 mg, oral, Daily  docusate sodium, 100 mg, oral, q12h  loratadine, 10 mg, oral, Daily  oxygen, 2 L/min, inhalation, q8h  polyethylene glycol, 17 g, oral, Daily      Continuous medications  lactated Ringer's, 100 mL/hr, Last Rate: 125 mL/hr (02/19/24 0708)      PRN medications  PRN medications: acetaminophen, HYDROcodone-acetaminophen, HYDROcodone-acetaminophen, naloxone, naloxone, naloxone, ondansetron ODT **OR** ondansetron  No results found for this or any previous visit (from the past 24 hour(s)).                           Assessment/Plan   Principal Problem:    S/P total knee arthroplasty, right  Active Problems:    Hyperlipidemia    S/P total knee arthroplasty, left    Osteoarthritis    Primary hypertension    Postop day 2 from right total knee arthroplasty.  Plan for physical therapy for mobilization and strengthening.  DVT prophylaxis for 30 days.  Pain control.  Upon discharge she will follow-up in the office in 2 weeks.  Plan for discharge to SNF for rehab today.           Declan Prasad MD

## 2024-02-21 NOTE — PROGRESS NOTES
02/21/24 0831   Discharge Planning   Who is requesting discharge planning? Other (Comment)  (Son, Mitchell)   Home or Post Acute Services Post acute facilities (Rehab/SNF/etc)   Type of Post Acute Facility Services Skilled nursing   Patient expects to be discharged to: [facility] ACCEPTED - PRECERT PENDING   Does the patient need discharge transport arranged? Yes   RoundTrip coordination needed? Yes   Has discharge transport been arranged? No         PRECERT PENDING FOR [facility].  ASKED FOR PASR TO BE COMPLETED FOR THE PRECERT BY [facility].

## 2024-02-21 NOTE — PROGRESS NOTES
Alexandria Rai is a 83 y.o. female on day 1 of admission presenting with S/P total knee arthroplasty, right.    Subjective   Pt seen and examined.  Pt sitting up in chair working with therapy.  No complaints other than RT knee pain.  No chest pain, palpitations, dyspnea.       Objective     Physical Exam  Constitutional:       General: She is not in acute distress.     Appearance: Normal appearance. She is not ill-appearing or toxic-appearing.   Cardiovascular:      Rate and Rhythm: Normal rate and regular rhythm.      Pulses: Normal pulses.      Heart sounds: Normal heart sounds.   Pulmonary:      Effort: Pulmonary effort is normal.      Breath sounds: Normal breath sounds.   Abdominal:      General: Bowel sounds are normal.      Palpations: Abdomen is soft.   Skin:     General: Skin is warm and dry.      Comments: Drsg RT knee dry and intact    Neurological:      General: No focal deficit present.      Mental Status: She is alert and oriented to person, place, and time.     Last Recorded Vitals  Blood pressure (!) 109/44, pulse 60, temperature 37.1 °C (98.8 °F), temperature source Temporal, resp. rate 16, height 1.524 m (5'), weight 70.8 kg (156 lb), SpO2 92 %.  Intake/Output last 3 Shifts:  I/O last 3 completed shifts:  In: 1790 (25.3 mL/kg) [P.O.:1580; IV Piggyback:210]  Out: 1450 (20.5 mL/kg) [Urine:1450 (0.6 mL/kg/hr)]  Weight: 70.8 kg     Relevant Results  Scheduled medications  aspirin, 81 mg, oral, BID  atenolol, 25 mg, oral, Daily  dilTIAZem CD, 120 mg, oral, BID  docusate sodium, 100 mg, oral, q12h  loratadine, 10 mg, oral, Daily  oxygen, 2 L/min, inhalation, q8h  polyethylene glycol, 17 g, oral, Daily      Continuous medications  lactated Ringer's, 100 mL/hr, Last Rate: 125 mL/hr (02/19/24 0708)      PRN medications  PRN medications: acetaminophen, HYDROcodone-acetaminophen, HYDROcodone-acetaminophen, naloxone, naloxone, naloxone, ondansetron ODT **OR** ondansetron                               Assessment/Plan   Principal Problem:    S/P total knee arthroplasty, right  Active Problems:    Hyperlipidemia    S/P total knee arthroplasty, left    Osteoarthritis    Primary hypertension    HTN  -Monitor blood pressure, 109/44  -Continue atenolol with parameters  -Hold chlorthalidone     RT TKA  -DVT prophylaxis per orthopedic surgeon  -Pain control  -PT, OT     Plan  Patient stable from a medical standpoint for discharge I have discontinued her Cardizem as her blood pressures have been on the low end of normal.  She continues on atenolol with parameters.  She should follow-up with her PCP/cardiologist for further management post discharge  Will sign off, call with any further concerns        Jenan Doyle, BIB-CNP

## 2024-02-21 NOTE — PROGRESS NOTES
02/21/24 1144   Discharge Planning   Home or Post Acute Services Post acute facilities (Rehab/SNF/etc)   Type of Post Acute Facility Services Skilled nursing   Patient expects to be discharged to: [facility] - AUTHORIZATION IN FROM INSURANCE.   Does the patient need discharge transport arranged? Yes   RoundTrip coordination needed? Yes   Has discharge transport been arranged? No         AUTHORIZATION IS BACK FOR PATIENT TO GO TO [facility].  TRANSPORT BEING ARRANGED.

## 2024-02-21 NOTE — PROGRESS NOTES
Occupational Therapy                 Therapy Communication Note    Patient Name: Alexandria Rai  MRN: [id number]  Today's Date: 2/21/2024     Discipline: Occupational Therapy    Missed Visit Reason:  attempted to see pt , she is reporting 10/10 pain in her knee. Notified nursing, OT treatment deferred at this time.     Missed Time: Attempt

## 2024-02-21 NOTE — CARE PLAN
The patient's goals for the shift include  manage pain    The clinical goals for the shift include manage pain,safety

## 2024-02-21 NOTE — PROGRESS NOTES
Alexandria Rai is a 83 y.o. female on day 2 of admission presenting with S/P total knee arthroplasty, right.    Subjective   Pt son translate via phone call, pt sitting in chair, reports pt post op pain controlled with Norco. Denies headache, vertigo, chills, chest pain, sob, cough, abd pain, nausea, appetite good, no bm, passing flatus,taking colace and miralax.  denies hematuria, dysuria, peripheral paresthesia.         Objective     Physical Exam  Constitutional:       Appearance: Normal appearance.   HENT:      Head: Normocephalic and atraumatic.      Right Ear: External ear normal.      Left Ear: External ear normal.      Nose: Nose normal.      Mouth/Throat:      Pharynx: Oropharynx is clear.   Eyes:      Conjunctiva/sclera: Conjunctivae normal.      Pupils: Pupils are equal, round, and reactive to light.   Cardiovascular:      Rate and Rhythm: Normal rate and regular rhythm.      Heart sounds: Normal heart sounds. No murmur heard.  Pulmonary:      Effort: Pulmonary effort is normal. No respiratory distress.      Breath sounds: Normal breath sounds. No wheezing, rhonchi or rales.   Chest:      Chest wall: No tenderness.   Abdominal:      General: Bowel sounds are normal. There is no distension.      Palpations: Abdomen is soft.      Tenderness: There is no abdominal tenderness.   Musculoskeletal:         General: Tenderness present.      Cervical back: Normal range of motion and neck supple.      Right lower leg: No edema.      Left lower leg: No edema.   Skin:     General: Skin is warm and dry.      Capillary Refill: Capillary refill takes 2 to 3 seconds.      Coloration: Skin is not pale.      Findings: No bruising, erythema, lesion or rash.      Comments: Ace wrap right leg dry/intact   Neurological:      General: No focal deficit present.      Mental Status: She is alert and oriented to person, place, and time. Mental status is at baseline.   Psychiatric:         Mood and Affect: Mood normal.          Behavior: Behavior normal.         Last Recorded Vitals  Blood pressure (!) 109/44, pulse 60, temperature 37.1 °C (98.8 °F), temperature source Temporal, resp. rate 16, height 1.524 m (5'), weight 70.8 kg (156 lb), SpO2 92 %.  Intake/Output last 3 Shifts:  I/O last 3 completed shifts:  In: 1790 (25.3 mL/kg) [P.O.:1580; IV Piggyback:210]  Out: 1450 (20.5 mL/kg) [Urine:1450 (0.6 mL/kg/hr)]  Weight: 70.8 kg     Relevant Results  Results for orders placed or performed during the hospital encounter of 02/19/24 (from the past 96 hour(s))   CBC   Result Value Ref Range    WBC 9.2 4.4 - 11.3 x10*3/uL    nRBC 0.0 0.0 - 0.0 /100 WBCs    RBC 4.07 4.00 - 5.20 x10*6/uL    Hemoglobin 10.7 (L) 12.0 - 16.0 g/dL    Hematocrit 33.2 (L) 36.0 - 46.0 %    MCV 82 80 - 100 fL    MCH 26.3 26.0 - 34.0 pg    MCHC 32.2 32.0 - 36.0 g/dL    RDW 15.1 (H) 11.5 - 14.5 %    Platelets 261 150 - 450 x10*3/uL   Basic Metabolic Panel   Result Value Ref Range    Glucose 133 (H) 65 - 99 mg/dL    Sodium 134 133 - 145 mmol/L    Potassium 3.7 3.4 - 5.1 mmol/L    Chloride 99 97 - 107 mmol/L    Bicarbonate 27 24 - 31 mmol/L    Urea Nitrogen 18 8 - 25 mg/dL    Creatinine 0.70 0.40 - 1.60 mg/dL    eGFR 86 >60 mL/min/1.73m*2    Calcium 8.5 8.5 - 10.4 mg/dL    Anion Gap 8 <=19 mmol/L   Iron and TIBC   Result Value Ref Range    Iron 27 (L) 30 - 160 ug/dL    UIBC 205 110 - 370 ug/dL    TIBC 232 228 - 428 ug/dL    % Saturation 12 12 - 50 %      Current Facility-Administered Medications:     acetaminophen (Tylenol) tablet 650 mg, 650 mg, oral, q4h PRN, Declan Prasad MD    aspirin EC tablet 81 mg, 81 mg, oral, BID, Declan Prasad MD, 81 mg at 02/21/24 0910    atenolol (Tenormin) tablet 25 mg, 25 mg, oral, Daily, 25 mg at 02/21/24 0528 **AND** [DISCONTINUED] chlorthalidone (Hygroton) tablet 12.5 mg, 12.5 mg, oral, Daily, Chon Lewis, PharmD, 12.5 mg at 02/19/24 1308    docusate sodium (Colace) capsule 100 mg, 100 mg, oral, q12h, Declan Prasad MD, 100 mg  at 02/21/24 0046    HYDROcodone-acetaminophen (Norco) 5-325 mg per tablet 1 tablet, 1 tablet, oral, q4h PRN, Declan Prasad MD, 1 tablet at 02/20/24 1209    HYDROcodone-acetaminophen (Norco) 5-325 mg per tablet 2 tablet, 2 tablet, oral, q4h PRN, Declan Prasad MD, 2 tablet at 02/21/24 0909    lactated Ringer's infusion, 100 mL/hr, intravenous, Continuous, Declan Prasad MD, Last Rate: 125 mL/hr at 02/19/24 0708, New Bag at 02/19/24 0708    loratadine (Claritin) tablet 10 mg, 10 mg, oral, Daily, Declan Prasad MD, 10 mg at 02/21/24 0911    naloxone (Narcan) injection 0.2 mg, 0.2 mg, intravenous, q5 min PRN, Declan Prasad MD    naloxone (Narcan) injection 0.2 mg, 0.2 mg, intravenous, q5 min PRN, Declan Prasad MD    naloxone (Narcan) injection 0.2 mg, 0.2 mg, intravenous, q5 min PRN, Declan Prasad MD    ondansetron ODT (Zofran-ODT) disintegrating tablet 4 mg, 4 mg, oral, q8h PRN **OR** ondansetron (Zofran) injection 4 mg, 4 mg, intravenous, q8h PRN, Declan Prasad MD    oxygen (O2) therapy, 2 L/min, inhalation, q8h, Declan Prasad MD, 2 L/min at 02/21/24 0000    polyethylene glycol (Glycolax, Miralax) packet 17 g, 17 g, oral, Daily, Declan Prasad MD, 17 g at 02/21/24 0909                              Assessment/Plan   Principal Problem:    S/P total knee arthroplasty, right  Active Problems:    Hyperlipidemia    S/P total knee arthroplasty, left    Osteoarthritis    Primary hypertension  Post op normocytic anemia  Anemia of acute blood loss  Venofer 200mg IV X2  Monitor H/H         Brandee Villarreal, APRN-CNP

## 2024-02-21 NOTE — PROGRESS NOTES
"Physical Therapy    Physical Therapy Treatment    Patient Name: Alexandria Rai  MRN: [id number]  Today's Date: 2/21/2024  Time Calculation  Start Time: 1302  Stop Time: 1329  Time Calculation (min): 27 min       Assessment/Plan   PT Assessment  PT Assessment Results: Decreased strength, Decreased range of motion, Decreased endurance, Impaired balance, Decreased mobility, Decreased safety awareness, Orthopedic restrictions, Impaired judgement  Rehab Prognosis: Good  Assessment Comment: Pt has difficulty sit to stand from low seated surfaces, c/o right shoulder \"bad\"  End of Session Patient Position: Bed, 3 rail up, Alarm off, caregiver present, Alarm off, not on at start of session  PT Plan  Inpatient/Swing Bed or Outpatient: Inpatient  PT Plan  Treatment/Interventions: Bed mobility, Transfer training, Gait training, Therapeutic exercise  PT Plan: Skilled PT  PT Frequency: BID  PT Discharge Recommendations: Low intensity level of continued care  Equipment Recommended upon Discharge: Wheeled walker  PT Recommended Transfer Status: Assist x1, Assistive device  PT - OK to Discharge: Yes      General Visit Information:   PT  Visit  PT Received On: 02/21/24  General  Family/Caregiver Present: Yes  Caregiver Feedback: son  Prior to Session Communication: Bedside nurse  Patient Position Received: Bed, 3 rail up  Preferred Learning Style: verbal, visual  General Comment: Pt agreeable to therapy    Subjective   Precautions:  Precautions  LE Weight Bearing Status: Weight Bearing as Tolerated  Medical Precautions: Fall precautions  Post-Surgical Precautions: Right total knee precautions  Vital Signs:       Objective   Pain:  Pain Assessment  Pain Assessment: 0-10  Pain Score: 6  Pain Type: Surgical pain  Pain Location: Knee  Pain Orientation: Right  Pain Interventions: Cold pack  Cognition:  Cognition  Overall Cognitive Status: Within Functional Limits                Treatments:  Therapeutic Exercise  Therapeutic Exercise " Performed: Yes  Therapeutic Exercise Activity 1: Pt performed seated bilat LE ankle pumps/heel raises, LAQ, hip flexion, dima hip adduction, resisted hip abduction x15 reps each. Right LE seated heel slides x15 reps.    Bed Mobility  Bed Mobility: Yes  Bed Mobility 1  Bed Mobility 1: Supine to sitting  Level of Assistance 1: Minimum assistance  Bed Mobility Comments 1: Light min assist with right LE off bed supine to sitting. Pt able to scoot self to edge of bed.  Bed Mobility 2  Bed Mobility  2: Sitting to supine  Level of Assistance 2: Minimum assistance  Bed Mobility Comments 2: Pt needed min assist with right LE onto bed sit to supine.    Ambulation/Gait Training  Ambulation/Gait Training Performed: Yes  Ambulation/Gait Training 1  Surface 1: Level tile  Device 1: Rolling walker  Assistance 1: Contact guard  Comments/Distance (ft) 1: Pt ambulated with RW ~15' x2 to/from bathroom and ~60'  in hallway, CGA. Mod antalgic gait.  Transfers  Transfer: Yes  Transfer 1  Transfer From 1: Bed to, Stand to  Transfer to 1: Stand, Bed  Technique 1: Sit to stand, Stand to sit  Transfer Device 1: Walker  Transfer Level of Assistance 1: Minimum assistance, Moderate assistance  Trials/Comments 1: Pt min assist sit to stand from edge of bed, mod assist sit to stand from toilet.  Transfers 2  Transfer From 2: Stand to  Transfer to 2: Sit, Chair with arms  Technique 2: Stand to sit  Transfer Level of Assistance 2: Contact guard  Transfers 3  Transfer From 3: Chair with arms to  Transfer to 3: Stand  Technique 3: Sit to stand  Transfer Device 3: Walker  Transfer Level of Assistance 3: Moderate assistance  Transfers 4  Transfer From 4: Stand to  Transfer to 4: Sit, Toilet  Technique 4: Stand to sit  Transfer Level of Assistance 4: Minimum assistance    Outcome Measures:  [facility] Basic Mobility  Turning from your back to your side while in a flat bed without using bedrails: A little  Moving from lying on your back to sitting on the side  of a flat bed without using bedrails: A little  Moving to and from bed to chair (including a wheelchair): A little  Standing up from a chair using your arms (e.g. wheelchair or bedside chair): A little  To walk in hospital room: A little  Climbing 3-5 steps with railing: A lot  Basic Mobility - Total Score: 17    Education Documentation  Handouts, taught by Monse Wyatt PTA at 2/21/2024  2:50 PM.  Learner: Patient  Readiness: Acceptance  Method: Explanation  Response: Verbalizes Understanding    Precautions, taught by Monse Wyatt PTA at 2/21/2024  2:50 PM.  Learner: Patient  Readiness: Acceptance  Method: Explanation  Response: Verbalizes Understanding    Body Mechanics, taught by Monse Wyatt PTA at 2/21/2024  2:50 PM.  Learner: Patient  Readiness: Acceptance  Method: Explanation  Response: Verbalizes Understanding    Home Exercise Program, taught by Monse Wyatt PTA at 2/21/2024  2:50 PM.  Learner: Patient  Readiness: Acceptance  Method: Explanation  Response: Verbalizes Understanding    Mobility Training, taught by Monse Wyatt PTA at 2/21/2024  2:50 PM.  Learner: Patient  Readiness: Acceptance  Method: Explanation  Response: Verbalizes Understanding    Education Comments  No comments found.        OP EDUCATION:       Encounter Problems       Encounter Problems (Active)       Balance       dynamic (Progressing)       Start:  02/19/24    Expected End:  02/26/24       No LOB with dynamic activities, supervision and use of walker in standing            Mobility       LTG - Patient will navigate 4-6 steps with rails/device (Progressing)       Start:  02/19/24    Expected End:  02/26/24            ambulation (Progressing)       Start:  02/19/24    Expected End:  02/26/24       Pt will amb >  75 ft with wheeled walker and mod independence             Safety       precautions (Progressing)       Start:  02/19/24    Expected End:  02/26/24       Pt will maintain TKA precautions with all mobility             Transfers       sit to stand (Progressing)       Start:  02/19/24    Expected End:  02/26/24       Pt will perform sit to stand transfer with walker and mod independence.          bed to chair (Progressing)       Start:  02/19/24    Expected End:  02/26/24       Pt will perform bed to chair transfer with walker and mod independence.

## 2024-02-21 NOTE — CARE PLAN
The patient's goals for the shift include      The clinical goals for the shift include manage pain,safety      Problem: Pain  Goal: My pain/discomfort is manageable  Outcome: Progressing     Problem: Safety  Goal: Patient will be injury free during hospitalization  Outcome: Progressing  Goal: I will remain free of falls  Outcome: Progressing        Well controlled, on statin, reviewed lipids, continue with healthy diet and exercise, follow yearly

## 2024-02-22 ENCOUNTER — NURSING HOME VISIT (OUTPATIENT)
Dept: POST ACUTE CARE | Facility: EXTERNAL LOCATION | Age: 84
End: 2024-02-22
Payer: COMMERCIAL

## 2024-02-22 VITALS
SYSTOLIC BLOOD PRESSURE: 147 MMHG | WEIGHT: 154 LBS | DIASTOLIC BLOOD PRESSURE: 69 MMHG | HEART RATE: 83 BPM | BODY MASS INDEX: 30.08 KG/M2 | RESPIRATION RATE: 20 BRPM | TEMPERATURE: 98.9 F | OXYGEN SATURATION: 93 %

## 2024-02-22 DIAGNOSIS — M17.0 PRIMARY OSTEOARTHRITIS OF BOTH KNEES: Primary | ICD-10-CM

## 2024-02-22 DIAGNOSIS — K59.03 DRUG-INDUCED CONSTIPATION: ICD-10-CM

## 2024-02-22 DIAGNOSIS — I10 PRIMARY HYPERTENSION: ICD-10-CM

## 2024-02-22 DIAGNOSIS — Z96.651 S/P TOTAL KNEE ARTHROPLASTY, RIGHT: ICD-10-CM

## 2024-02-22 DIAGNOSIS — D64.9 ANEMIA, UNSPECIFIED TYPE: ICD-10-CM

## 2024-02-22 DIAGNOSIS — M54.30 SCIATICA, UNSPECIFIED LATERALITY: ICD-10-CM

## 2024-02-22 PROCEDURE — 99309 SBSQ NF CARE MODERATE MDM 30: CPT | Performed by: PHYSICIAN ASSISTANT

## 2024-02-22 ASSESSMENT — ENCOUNTER SYMPTOMS
CONSTIPATION: 1
NERVOUS/ANXIOUS: 0
SHORTNESS OF BREATH: 0
VOMITING: 0
DIARRHEA: 0
FEVER: 0
ABDOMINAL PAIN: 0
CHILLS: 0
WHEEZING: 0
COUGH: 0
NAUSEA: 0
ARTHRALGIAS: 1

## 2024-02-22 NOTE — PROGRESS NOTES
Subjective   [id number] : Alexandria Rai is a 83 y.o. female admitted to [facility] for rehab.     HPI  Pt with h/o osteoarthritis of the knee underwent an elective right TKA with Dr Prasad on 2/19.   Pt had post op anemia and was treated with venofer x 2.  She is seen with her son at the bedside.  Pt reports adequate pain control with the norco.   She reports no bowel movements since before surgery.  She is on colace but has requested a suppository.   She denies any abdominal pain. No nausea or emesis.  She has no significant lower leg edema.       Review of Systems   Constitutional:  Negative for chills and fever.   Respiratory:  Negative for cough, shortness of breath and wheezing.    Cardiovascular:  Negative for leg swelling.   Gastrointestinal:  Positive for constipation. Negative for abdominal pain, diarrhea, nausea and vomiting.   Musculoskeletal:  Positive for arthralgias.   Psychiatric/Behavioral:  The patient is not nervous/anxious.    All other systems reviewed and are negative.      Objective   /69   Pulse 83   Temp 37.2 °C (98.9 °F)   Resp 20   Wt 69.9 kg (154 lb)   SpO2 93%   BMI 30.08 kg/m²    Physical Exam  Constitutional:       General: She is not in acute distress.  Eyes:      Conjunctiva/sclera: Conjunctivae normal.      Pupils: Pupils are equal, round, and reactive to light.   Cardiovascular:      Rate and Rhythm: Normal rate and regular rhythm.      Heart sounds: No murmur heard.  Pulmonary:      Effort: Pulmonary effort is normal.      Breath sounds: No wheezing, rhonchi or rales.   Abdominal:      General: Abdomen is flat. Bowel sounds are normal. There is no distension.      Palpations: Abdomen is soft. There is no mass.      Tenderness: There is no abdominal tenderness.   Musculoskeletal:         General: No swelling. Normal range of motion.      Comments: Right knee incision with silver dressing in place.  Mild localized edema.  No redness, drainage or increased warmth  "noted. Mild brusing noted to the right knee.    Skin:     General: Skin is warm and dry.      Findings: No rash.   Neurological:      General: No focal deficit present.      Mental Status: She is alert and oriented to person, place, and time. Mental status is at baseline.       No lab exists for component: \"CBC BMP\"  Assessment/Plan   Primary osteoarthritis of both knees - Primary M17.0      Sciatica M54.30     S/P total knee arthroplasty, Right        Continue PT/OT.  had Follow up with dr Prasad as requestined..  Pain management with Norco.  Aspirin for DVT prophylaxis.              Primary hypertension I10       Continue atenolol-chlorthaladone and diltiazem.  Monitor blood pressures and adjust meds as needed.             Drug-induced constipation K59.03       Taking norco for pain management. Colace  BID.   Give suppository x 1 today for constipation issues.          Blood loss anemia:  s/p venofer x 2.  Hemoglobin stable at 10.9 today.         Time spent: 30 min in review of chart, labs and orders, consultation with pt and documentation.   "

## 2024-02-22 NOTE — LETTER
Patient: Alexandria Rai  : 1940    Encounter Date: 2024        Subjective  [id number] : Alexandria Rai is a 83 y.o. female admitted to [facility] for rehab.     HPI  Pt with h/o osteoarthritis of the knee underwent an elective right TKA with Dr Prasad on .   Pt had post op anemia and was treated with venofer x 2.  She is seen with her son at the bedside.  Pt reports adequate pain control with the norco.   She reports no bowel movements since before surgery.  She is on colace but has requested a suppository.   She denies any abdominal pain. No nausea or emesis.  She has no significant lower leg edema.       Review of Systems   Constitutional:  Negative for chills and fever.   Respiratory:  Negative for cough, shortness of breath and wheezing.    Cardiovascular:  Negative for leg swelling.   Gastrointestinal:  Positive for constipation. Negative for abdominal pain, diarrhea, nausea and vomiting.   Musculoskeletal:  Positive for arthralgias.   Psychiatric/Behavioral:  The patient is not nervous/anxious.    All other systems reviewed and are negative.      Objective  /69   Pulse 83   Temp 37.2 °C (98.9 °F)   Resp 20   Wt 69.9 kg (154 lb)   SpO2 93%   BMI 30.08 kg/m²    Physical Exam  Constitutional:       General: She is not in acute distress.  Eyes:      Conjunctiva/sclera: Conjunctivae normal.      Pupils: Pupils are equal, round, and reactive to light.   Cardiovascular:      Rate and Rhythm: Normal rate and regular rhythm.      Heart sounds: No murmur heard.  Pulmonary:      Effort: Pulmonary effort is normal.      Breath sounds: No wheezing, rhonchi or rales.   Abdominal:      General: Abdomen is flat. Bowel sounds are normal. There is no distension.      Palpations: Abdomen is soft. There is no mass.      Tenderness: There is no abdominal tenderness.   Musculoskeletal:         General: No swelling. Normal range of motion.      Comments: Right knee incision with silver dressing in  "place.  Mild localized edema.  No redness, drainage or increased warmth noted. Mild brusing noted to the right knee.    Skin:     General: Skin is warm and dry.      Findings: No rash.   Neurological:      General: No focal deficit present.      Mental Status: She is alert and oriented to person, place, and time. Mental status is at baseline.       No lab exists for component: \"CBC BMP\"  Assessment/Plan  Primary osteoarthritis of both knees - Primary M17.0      Sciatica M54.30     S/P total knee arthroplasty, Right        Continue PT/OT.  had Follow up with dr Prasad as requestined..  Pain management with Norco.  Aspirin for DVT prophylaxis.              Primary hypertension I10       Continue atenolol-chlorthaladone and diltiazem.  Monitor blood pressures and adjust meds as needed.             Drug-induced constipation K59.03       Taking norco for pain management. Colace  BID.   Give suppository x 1 today for constipation issues.          Blood loss anemia:  s/p venofer x 2.  Hemoglobin stable at 10.9 today.         Time spent: 30 min in review of chart, labs and orders, consultation with pt and documentation.       Electronically Signed By: Marilee Morin PA-C   2/22/24  9:28 PM  "

## 2024-02-29 ENCOUNTER — NURSING HOME VISIT (OUTPATIENT)
Dept: POST ACUTE CARE | Facility: EXTERNAL LOCATION | Age: 84
End: 2024-02-29
Payer: COMMERCIAL

## 2024-02-29 VITALS
WEIGHT: 150 LBS | TEMPERATURE: 97.3 F | OXYGEN SATURATION: 96 % | BODY MASS INDEX: 29.29 KG/M2 | HEART RATE: 77 BPM | SYSTOLIC BLOOD PRESSURE: 113 MMHG | RESPIRATION RATE: 20 BRPM | DIASTOLIC BLOOD PRESSURE: 75 MMHG

## 2024-02-29 DIAGNOSIS — M54.30 SCIATICA, UNSPECIFIED LATERALITY: ICD-10-CM

## 2024-02-29 DIAGNOSIS — M17.0 PRIMARY OSTEOARTHRITIS OF BOTH KNEES: Primary | ICD-10-CM

## 2024-02-29 DIAGNOSIS — D64.9 ANEMIA, UNSPECIFIED TYPE: ICD-10-CM

## 2024-02-29 DIAGNOSIS — E87.6 HYPOKALEMIA: ICD-10-CM

## 2024-02-29 DIAGNOSIS — I10 PRIMARY HYPERTENSION: ICD-10-CM

## 2024-02-29 DIAGNOSIS — J18.9 PNEUMONIA DUE TO INFECTIOUS ORGANISM, UNSPECIFIED LATERALITY, UNSPECIFIED PART OF LUNG: ICD-10-CM

## 2024-02-29 DIAGNOSIS — Z96.651 S/P TOTAL KNEE ARTHROPLASTY, RIGHT: ICD-10-CM

## 2024-02-29 DIAGNOSIS — K59.03 DRUG-INDUCED CONSTIPATION: ICD-10-CM

## 2024-02-29 PROCEDURE — 99309 SBSQ NF CARE MODERATE MDM 30: CPT | Performed by: PHYSICIAN ASSISTANT

## 2024-02-29 ASSESSMENT — ENCOUNTER SYMPTOMS
WHEEZING: 0
CHILLS: 0
COUGH: 0
CONSTIPATION: 0
VOMITING: 0
ARTHRALGIAS: 1
DIARRHEA: 0
SHORTNESS OF BREATH: 0
FEVER: 0
ABDOMINAL PAIN: 0
NERVOUS/ANXIOUS: 0
NAUSEA: 0

## 2024-02-29 NOTE — LETTER
Patient: Alexandria Rai  : 1940    Encounter Date: 2024        Subjective  [id number] : Aelxandria Rai is a 83 y.o. female admitted to [facility] for rehab.     HPI  Pt with h/o osteoarthritis of the knee underwent an elective right TKA with Dr Prasad on .   Pt seen while sitting up in wheelchair.   Pt continues on course of levaquin for pneumonia.   Her symptoms appear to be improving.  She is off of the oxygen at the time of my visit. .  She appears to be stable on room air.  No shortness of breath or cough noted.  She reports some pain in the surgical leg.  She has some noted bruising to back of the knee and leg.  She is using norco for pain.  She had routine labs today which were reviewed.  Hemoglobin is trending up at 11.1 today.    Her potassium was noted to be a little low at 3.1.   No new complaints or concerns.  She is eating and drinking well.   She has been having regular bowel movements.  No n/v/d/c.    She has no significant lower leg edema.       Review of Systems   Constitutional:  Negative for chills and fever.   Respiratory:  Negative for cough, shortness of breath and wheezing.    Cardiovascular:  Negative for leg swelling.   Gastrointestinal:  Negative for abdominal pain, constipation, diarrhea, nausea and vomiting.   Musculoskeletal:  Positive for arthralgias.   Psychiatric/Behavioral:  The patient is not nervous/anxious.    All other systems reviewed and are negative.      Objective  /75   Pulse 77   Temp 36.3 °C (97.3 °F)   Resp 20   Wt 68 kg (150 lb)   SpO2 96%   BMI 29.29 kg/m²    Physical Exam  Constitutional:       General: She is not in acute distress.  Eyes:      Conjunctiva/sclera: Conjunctivae normal.      Pupils: Pupils are equal, round, and reactive to light.   Cardiovascular:      Rate and Rhythm: Normal rate and regular rhythm.      Heart sounds: No murmur heard.  Pulmonary:      Effort: Pulmonary effort is normal.      Breath sounds: No wheezing,  "rhonchi or rales.   Abdominal:      General: Abdomen is flat. Bowel sounds are normal. There is no distension.      Palpations: Abdomen is soft. There is no mass.      Tenderness: There is no abdominal tenderness.   Musculoskeletal:         General: No swelling. Normal range of motion.      Comments: Right knee incision with silver dressing in place.  Mild localized edema.  No redness, drainage or increased warmth noted. Mild brusing noted to the right knee.    Skin:     General: Skin is warm and dry.      Findings: No rash.   Neurological:      General: No focal deficit present.      Mental Status: She is alert and oriented to person, place, and time. Mental status is at baseline.       No lab exists for component: \"CBC BMP\"  Assessment/Plan  Primary osteoarthritis of both knees - Primary M17.0      Sciatica M54.30     S/P total knee arthroplasty, Right        Continue PT/OT.  has Follow up with dr Prasad on 3/5 .  Pain management with Norco.  Aspirin for DVT prophylaxis.              Primary hypertension I10       Continue atenolol-chlorthaladone and diltiazem.  Monitor blood pressures and adjust meds as needed.             Drug-induced constipation K59.03       Taking norco for pain management. Colace  BID.   Improved with routine bowel meds         Blood loss anemia:  s/p venofer x 2.  Hemoglobin stable at 11.1 today.     Pneumonia:  continues on course of levaquin through 3/3.   Wean off oxygen as tolerated.     Hypokalemia:  add potassium Chloride 20 MEQ daily.  Monitor bmp         Time spent: 30 min in review of chart, labs and orders, consultation with pt and documentation.       Electronically Signed By: Marilee Morin PA-C   2/29/24  3:30 PM  "

## 2024-02-29 NOTE — PROGRESS NOTES
Subjective   [id number] : Alexandria Rai is a 83 y.o. female admitted to [facility] for rehab.     HPI  Pt with h/o osteoarthritis of the knee underwent an elective right TKA with Dr Prasad on 2/19.   Pt seen while sitting up in wheelchair.   Pt continues on course of levaquin for pneumonia.   Her symptoms appear to be improving.  She is off of the oxygen at the time of my visit. .  She appears to be stable on room air.  No shortness of breath or cough noted.  She reports some pain in the surgical leg.  She has some noted bruising to back of the knee and leg.  She is using norco for pain.  She had routine labs today which were reviewed.  Hemoglobin is trending up at 11.1 today.    Her potassium was noted to be a little low at 3.1.   No new complaints or concerns.  She is eating and drinking well.   She has been having regular bowel movements.  No n/v/d/c.    She has no significant lower leg edema.       Review of Systems   Constitutional:  Negative for chills and fever.   Respiratory:  Negative for cough, shortness of breath and wheezing.    Cardiovascular:  Negative for leg swelling.   Gastrointestinal:  Negative for abdominal pain, constipation, diarrhea, nausea and vomiting.   Musculoskeletal:  Positive for arthralgias.   Psychiatric/Behavioral:  The patient is not nervous/anxious.    All other systems reviewed and are negative.      Objective   /75   Pulse 77   Temp 36.3 °C (97.3 °F)   Resp 20   Wt 68 kg (150 lb)   SpO2 96%   BMI 29.29 kg/m²    Physical Exam  Constitutional:       General: She is not in acute distress.  Eyes:      Conjunctiva/sclera: Conjunctivae normal.      Pupils: Pupils are equal, round, and reactive to light.   Cardiovascular:      Rate and Rhythm: Normal rate and regular rhythm.      Heart sounds: No murmur heard.  Pulmonary:      Effort: Pulmonary effort is normal.      Breath sounds: No wheezing, rhonchi or rales.   Abdominal:      General: Abdomen is flat. Bowel  "sounds are normal. There is no distension.      Palpations: Abdomen is soft. There is no mass.      Tenderness: There is no abdominal tenderness.   Musculoskeletal:         General: No swelling. Normal range of motion.      Comments: Right knee incision with silver dressing in place.  Mild localized edema.  No redness, drainage or increased warmth noted. Mild brusing noted to the right knee.    Skin:     General: Skin is warm and dry.      Findings: No rash.   Neurological:      General: No focal deficit present.      Mental Status: She is alert and oriented to person, place, and time. Mental status is at baseline.       No lab exists for component: \"CBC BMP\"  Assessment/Plan   Primary osteoarthritis of both knees - Primary M17.0      Sciatica M54.30     S/P total knee arthroplasty, Right        Continue PT/OT.  has Follow up with dr Prasad on 3/5 .  Pain management with Norco.  Aspirin for DVT prophylaxis.              Primary hypertension I10       Continue atenolol-chlorthaladone and diltiazem.  Monitor blood pressures and adjust meds as needed.             Drug-induced constipation K59.03       Taking norco for pain management. Colace  BID.   Improved with routine bowel meds         Blood loss anemia:  s/p venofer x 2.  Hemoglobin stable at 11.1 today.     Pneumonia:  continues on course of levaquin through 3/3.   Wean off oxygen as tolerated.     Hypokalemia:  add potassium Chloride 20 MEQ daily.  Monitor bmp         Time spent: 30 min in review of chart, labs and orders, consultation with pt and documentation.   "

## 2024-03-07 ENCOUNTER — NURSING HOME VISIT (OUTPATIENT)
Dept: POST ACUTE CARE | Facility: EXTERNAL LOCATION | Age: 84
End: 2024-03-07
Payer: COMMERCIAL

## 2024-03-07 VITALS
TEMPERATURE: 98.6 F | OXYGEN SATURATION: 97 % | BODY MASS INDEX: 28.9 KG/M2 | WEIGHT: 148 LBS | SYSTOLIC BLOOD PRESSURE: 111 MMHG | RESPIRATION RATE: 20 BRPM | DIASTOLIC BLOOD PRESSURE: 66 MMHG | HEART RATE: 81 BPM

## 2024-03-07 DIAGNOSIS — J18.9 PNEUMONIA DUE TO INFECTIOUS ORGANISM, UNSPECIFIED LATERALITY, UNSPECIFIED PART OF LUNG: ICD-10-CM

## 2024-03-07 DIAGNOSIS — M17.0 PRIMARY OSTEOARTHRITIS OF BOTH KNEES: Primary | ICD-10-CM

## 2024-03-07 DIAGNOSIS — K59.03 DRUG-INDUCED CONSTIPATION: ICD-10-CM

## 2024-03-07 DIAGNOSIS — Z96.651 S/P TOTAL KNEE ARTHROPLASTY, RIGHT: ICD-10-CM

## 2024-03-07 DIAGNOSIS — M54.30 SCIATICA, UNSPECIFIED LATERALITY: ICD-10-CM

## 2024-03-07 DIAGNOSIS — D64.9 ANEMIA, UNSPECIFIED TYPE: ICD-10-CM

## 2024-03-07 DIAGNOSIS — E87.6 HYPOKALEMIA: ICD-10-CM

## 2024-03-07 DIAGNOSIS — I10 PRIMARY HYPERTENSION: ICD-10-CM

## 2024-03-07 PROCEDURE — 99309 SBSQ NF CARE MODERATE MDM 30: CPT | Performed by: PHYSICIAN ASSISTANT

## 2024-03-07 ASSESSMENT — ENCOUNTER SYMPTOMS
SHORTNESS OF BREATH: 0
CONSTIPATION: 0
CHILLS: 0
ABDOMINAL PAIN: 0
ARTHRALGIAS: 1
FEVER: 0
NAUSEA: 0
WHEEZING: 0
NERVOUS/ANXIOUS: 0
VOMITING: 0
COUGH: 0
DIARRHEA: 0

## 2024-03-07 NOTE — LETTER
Patient: Aelxandria Rai  : 1940    Encounter Date: 2024        Subjective  [id number] : Alexandria Rai is a 83 y.o. female admitted to [facility] for rehab.     HPI  Pt with h/o osteoarthritis of the knee underwent an elective right TKA with Dr Prasad on .   Pt seen while resting in bed.  Pt was treated for pneumonia.  She has oxygen in place today.  Last visit she was stable on room air.  Nursing reports no hypoxia.   She appears to be stable with no noted cough or shortness of breath.   Pain has been well controlled with pain medication.  Pt had routine labs today which were reviewed and are stable.  No new complaints or concerns.  She is eating and drinking well.   She has been having regular bowel movements.  No n/v/d/c.    She has no significant lower leg edema.       Review of Systems   Constitutional:  Negative for chills and fever.   Respiratory:  Negative for cough, shortness of breath and wheezing.    Cardiovascular:  Negative for leg swelling.   Gastrointestinal:  Negative for abdominal pain, constipation, diarrhea, nausea and vomiting.   Musculoskeletal:  Positive for arthralgias.   Psychiatric/Behavioral:  The patient is not nervous/anxious.    All other systems reviewed and are negative.      Objective  /66   Pulse 81   Temp 37 °C (98.6 °F)   Resp 20   Wt 67.1 kg (148 lb)   SpO2 97%   BMI 28.90 kg/m²    Physical Exam  Constitutional:       General: She is not in acute distress.  Eyes:      Conjunctiva/sclera: Conjunctivae normal.      Pupils: Pupils are equal, round, and reactive to light.   Cardiovascular:      Rate and Rhythm: Normal rate and regular rhythm.      Heart sounds: No murmur heard.  Pulmonary:      Effort: Pulmonary effort is normal.      Breath sounds: No wheezing, rhonchi or rales.   Abdominal:      General: Abdomen is flat. Bowel sounds are normal. There is no distension.      Palpations: Abdomen is soft. There is no mass.      Tenderness: There is no  "abdominal tenderness.   Musculoskeletal:         General: No swelling. Normal range of motion.      Comments: Right knee incision with silver dressing in place.  Mild localized edema.  No redness, drainage or increased warmth noted. Mild brusing noted to the right knee.    Skin:     General: Skin is warm and dry.      Findings: No rash.   Neurological:      General: No focal deficit present.      Mental Status: She is alert and oriented to person, place, and time. Mental status is at baseline.       No lab exists for component: \"CBC BMP\"  Assessment/Plan  Primary osteoarthritis of both knees - Primary M17.0      Sciatica M54.30     S/P total knee arthroplasty, Right        Continue PT/OT.  has Follow up with dr Prasad on 3/5 .  Pain management with Norco.  Aspirin for DVT prophylaxis.              Primary hypertension I10       Continue atenolol-chlorthaladone and diltiazem.  Monitor blood pressures and adjust meds as needed.             Drug-induced constipation K59.03       Taking norco for pain management. Colace  BID.   Improved with routine bowel meds         Blood loss anemia:  s/p venofer x 2.  Hemoglobin stable at 11.1 today.     Pneumonia:  completed  course of levaquin on 3/3.   Wean off oxygen as tolerated. Spoke with nursing about taking off oxygen and monitoring.     Hypokalemia:  improved.  continue potassium Chloride 20 MEQ daily.  Monitor bmp         Time spent: 30 min in review of chart, labs and orders, consultation with pt and documentation.       Electronically Signed By: Marilee Morin PA-C   3/7/24  3:27 PM  "

## 2024-03-07 NOTE — PROGRESS NOTES
Subjective   [id number] : Alexandria Rai is a 83 y.o. female admitted to [facility] for rehab.     HPI  Pt with h/o osteoarthritis of the knee underwent an elective right TKA with Dr Prasad on 2/19.   Pt seen while resting in bed.  Pt was treated for pneumonia.  She has oxygen in place today.  Last visit she was stable on room air.  Nursing reports no hypoxia.   She appears to be stable with no noted cough or shortness of breath.   Pain has been well controlled with pain medication.  Pt had routine labs today which were reviewed and are stable.  No new complaints or concerns.  She is eating and drinking well.   She has been having regular bowel movements.  No n/v/d/c.    She has no significant lower leg edema.       Review of Systems   Constitutional:  Negative for chills and fever.   Respiratory:  Negative for cough, shortness of breath and wheezing.    Cardiovascular:  Negative for leg swelling.   Gastrointestinal:  Negative for abdominal pain, constipation, diarrhea, nausea and vomiting.   Musculoskeletal:  Positive for arthralgias.   Psychiatric/Behavioral:  The patient is not nervous/anxious.    All other systems reviewed and are negative.      Objective   /66   Pulse 81   Temp 37 °C (98.6 °F)   Resp 20   Wt 67.1 kg (148 lb)   SpO2 97%   BMI 28.90 kg/m²    Physical Exam  Constitutional:       General: She is not in acute distress.  Eyes:      Conjunctiva/sclera: Conjunctivae normal.      Pupils: Pupils are equal, round, and reactive to light.   Cardiovascular:      Rate and Rhythm: Normal rate and regular rhythm.      Heart sounds: No murmur heard.  Pulmonary:      Effort: Pulmonary effort is normal.      Breath sounds: No wheezing, rhonchi or rales.   Abdominal:      General: Abdomen is flat. Bowel sounds are normal. There is no distension.      Palpations: Abdomen is soft. There is no mass.      Tenderness: There is no abdominal tenderness.   Musculoskeletal:         General: No swelling.  "Normal range of motion.      Comments: Right knee incision with silver dressing in place.  Mild localized edema.  No redness, drainage or increased warmth noted. Mild brusing noted to the right knee.    Skin:     General: Skin is warm and dry.      Findings: No rash.   Neurological:      General: No focal deficit present.      Mental Status: She is alert and oriented to person, place, and time. Mental status is at baseline.       No lab exists for component: \"CBC BMP\"  Assessment/Plan   Primary osteoarthritis of both knees - Primary M17.0      Sciatica M54.30     S/P total knee arthroplasty, Right        Continue PT/OT.  has Follow up with dr Prasad on 3/5 .  Pain management with Norco.  Aspirin for DVT prophylaxis.              Primary hypertension I10       Continue atenolol-chlorthaladone and diltiazem.  Monitor blood pressures and adjust meds as needed.             Drug-induced constipation K59.03       Taking norco for pain management. Colace  BID.   Improved with routine bowel meds         Blood loss anemia:  s/p venofer x 2.  Hemoglobin stable at 11.1 today.     Pneumonia:  completed  course of levaquin on 3/3.   Wean off oxygen as tolerated. Spoke with nursing about taking off oxygen and monitoring.     Hypokalemia:  improved.  continue potassium Chloride 20 MEQ daily.  Monitor bmp         Time spent: 30 min in review of chart, labs and orders, consultation with pt and documentation.   "

## 2024-03-13 ENCOUNTER — TELEPHONE (OUTPATIENT)
Dept: PRIMARY CARE | Facility: CLINIC | Age: 84
End: 2024-03-13
Payer: COMMERCIAL

## 2024-03-13 ENCOUNTER — APPOINTMENT (OUTPATIENT)
Dept: PHYSICAL THERAPY | Facility: CLINIC | Age: 84
End: 2024-03-13
Payer: COMMERCIAL

## 2024-03-13 NOTE — TELEPHONE ENCOUNTER
Pt daughter in law called and states that pt needs letter for [employer] stating pt is in need of assistance

## 2024-03-15 NOTE — TELEPHONE ENCOUNTER
PT DAUGHTER ADVISED TO SCHEDULE OV. SHE WANTED TELEHEALTH. PT DAUGHTER IS GOING TO CONTACT [employer]GE[employer] TO SEE IF THEY WILL WRITE THE LETTER.

## 2024-03-21 ENCOUNTER — HOSPITAL ENCOUNTER (OUTPATIENT)
Dept: RADIOLOGY | Facility: HOSPITAL | Age: 84
Discharge: HOME | End: 2024-03-21
Payer: COMMERCIAL

## 2024-03-21 ENCOUNTER — PHARMACY VISIT (OUTPATIENT)
Dept: PHARMACY | Facility: CLINIC | Age: 84
End: 2024-03-21
Payer: COMMERCIAL

## 2024-03-21 ENCOUNTER — OFFICE VISIT (OUTPATIENT)
Dept: PRIMARY CARE | Facility: CLINIC | Age: 84
End: 2024-03-21
Payer: COMMERCIAL

## 2024-03-21 VITALS
SYSTOLIC BLOOD PRESSURE: 122 MMHG | HEART RATE: 67 BPM | DIASTOLIC BLOOD PRESSURE: 60 MMHG | HEIGHT: 60 IN | WEIGHT: 145 LBS | BODY MASS INDEX: 28.47 KG/M2 | OXYGEN SATURATION: 98 % | TEMPERATURE: 98.7 F | RESPIRATION RATE: 18 BRPM

## 2024-03-21 DIAGNOSIS — R07.1 CHEST PAIN ON BREATHING: ICD-10-CM

## 2024-03-21 DIAGNOSIS — R05.8 OTHER COUGH: ICD-10-CM

## 2024-03-21 DIAGNOSIS — R53.1 WEAKNESS GENERALIZED: ICD-10-CM

## 2024-03-21 DIAGNOSIS — J12.0 PNEUMONIA DUE TO ADENOVIRUS: ICD-10-CM

## 2024-03-21 DIAGNOSIS — R06.2 WHEEZING: ICD-10-CM

## 2024-03-21 DIAGNOSIS — D50.0 IRON DEFICIENCY ANEMIA DUE TO CHRONIC BLOOD LOSS: ICD-10-CM

## 2024-03-21 DIAGNOSIS — J12.0 PNEUMONIA DUE TO ADENOVIRUS: Primary | ICD-10-CM

## 2024-03-21 DIAGNOSIS — B37.0 THRUSH: ICD-10-CM

## 2024-03-21 LAB
BASOPHILS # BLD AUTO: 0.01 X10*3/UL (ref 0–0.1)
BASOPHILS NFR BLD AUTO: 0.1 %
EOSINOPHIL # BLD AUTO: 0.06 X10*3/UL (ref 0–0.4)
EOSINOPHIL NFR BLD AUTO: 0.8 %
ERYTHROCYTE [DISTWIDTH] IN BLOOD BY AUTOMATED COUNT: 15.8 % (ref 11.5–14.5)
HCT VFR BLD AUTO: 39.1 % (ref 36–46)
HGB BLD-MCNC: 12.7 G/DL (ref 12–16)
IMM GRANULOCYTES # BLD AUTO: 0.01 X10*3/UL (ref 0–0.5)
IMM GRANULOCYTES NFR BLD AUTO: 0.1 % (ref 0–0.9)
IRON SATN MFR SERPL: 27 % (ref 12–50)
IRON SERPL-MCNC: 74 UG/DL (ref 30–160)
LYMPHOCYTES # BLD AUTO: 4.4 X10*3/UL (ref 0.8–3)
LYMPHOCYTES NFR BLD AUTO: 58 %
MCH RBC QN AUTO: 26.7 PG (ref 26–34)
MCHC RBC AUTO-ENTMCNC: 32.5 G/DL (ref 32–36)
MCV RBC AUTO: 82 FL (ref 80–100)
MONOCYTES # BLD AUTO: 0.5 X10*3/UL (ref 0.05–0.8)
MONOCYTES NFR BLD AUTO: 6.6 %
NEUTROPHILS # BLD AUTO: 2.61 X10*3/UL (ref 1.6–5.5)
NEUTROPHILS NFR BLD AUTO: 34.4 %
NRBC BLD-RTO: 0 /100 WBCS (ref 0–0)
PLATELET # BLD AUTO: 309 X10*3/UL (ref 150–450)
POC APPEARANCE, URINE: CLEAR
POC BILIRUBIN, URINE: NEGATIVE
POC BLOOD, URINE: ABNORMAL
POC COLOR, URINE: YELLOW
POC GLUCOSE, URINE: NEGATIVE MG/DL
POC KETONES, URINE: NEGATIVE MG/DL
POC LEUKOCYTES, URINE: NEGATIVE
POC NITRITE,URINE: NEGATIVE
POC PH, URINE: 6.5 PH
POC PROTEIN, URINE: NEGATIVE MG/DL
POC SPECIFIC GRAVITY, URINE: 1.01
POC UROBILINOGEN, URINE: 0.2 EU/DL
RBC # BLD AUTO: 4.76 X10*6/UL (ref 4–5.2)
TIBC SERPL-MCNC: 274 UG/DL (ref 228–428)
UIBC SERPL-MCNC: 200 UG/DL (ref 110–370)
WBC # BLD AUTO: 7.6 X10*3/UL (ref 4.4–11.3)

## 2024-03-21 PROCEDURE — 36415 COLL VENOUS BLD VENIPUNCTURE: CPT | Performed by: NURSE PRACTITIONER

## 2024-03-21 PROCEDURE — 1125F AMNT PAIN NOTED PAIN PRSNT: CPT | Performed by: NURSE PRACTITIONER

## 2024-03-21 PROCEDURE — RXMED WILLOW AMBULATORY MEDICATION CHARGE

## 2024-03-21 PROCEDURE — 71275 CT ANGIOGRAPHY CHEST: CPT | Performed by: RADIOLOGY

## 2024-03-21 PROCEDURE — 81003 URINALYSIS AUTO W/O SCOPE: CPT | Performed by: NURSE PRACTITIONER

## 2024-03-21 PROCEDURE — 85025 COMPLETE CBC W/AUTO DIFF WBC: CPT | Performed by: NURSE PRACTITIONER

## 2024-03-21 PROCEDURE — 1036F TOBACCO NON-USER: CPT | Performed by: NURSE PRACTITIONER

## 2024-03-21 PROCEDURE — 85300 ANTITHROMBIN III ACTIVITY: CPT | Performed by: NURSE PRACTITIONER

## 2024-03-21 PROCEDURE — 71275 CT ANGIOGRAPHY CHEST: CPT

## 2024-03-21 PROCEDURE — 3074F SYST BP LT 130 MM HG: CPT | Performed by: NURSE PRACTITIONER

## 2024-03-21 PROCEDURE — 3078F DIAST BP <80 MM HG: CPT | Performed by: NURSE PRACTITIONER

## 2024-03-21 PROCEDURE — 1111F DSCHRG MED/CURRENT MED MERGE: CPT | Performed by: NURSE PRACTITIONER

## 2024-03-21 PROCEDURE — 1159F MED LIST DOCD IN RCRD: CPT | Performed by: NURSE PRACTITIONER

## 2024-03-21 PROCEDURE — 99214 OFFICE O/P EST MOD 30 MIN: CPT | Performed by: NURSE PRACTITIONER

## 2024-03-21 PROCEDURE — 83540 ASSAY OF IRON: CPT | Performed by: NURSE PRACTITIONER

## 2024-03-21 PROCEDURE — 2550000001 HC RX 255 CONTRASTS: Performed by: NURSE PRACTITIONER

## 2024-03-21 RX ORDER — ALBUTEROL SULFATE 90 UG/1
2 AEROSOL, METERED RESPIRATORY (INHALATION) EVERY 4 HOURS PRN
Qty: 8.5 G | Refills: 11 | Status: SHIPPED | OUTPATIENT
Start: 2024-03-21 | End: 2025-03-21

## 2024-03-21 RX ORDER — AZITHROMYCIN 250 MG/1
TABLET, FILM COATED ORAL
Qty: 6 TABLET | Refills: 0 | Status: SHIPPED | OUTPATIENT
Start: 2024-03-21 | End: 2024-03-26

## 2024-03-21 RX ORDER — NYSTATIN 100000 [USP'U]/ML
5 SUSPENSION ORAL 3 TIMES DAILY
Qty: 75 ML | Refills: 0 | Status: SHIPPED | OUTPATIENT
Start: 2024-03-21 | End: 2024-03-26

## 2024-03-21 RX ADMIN — IOHEXOL 75 ML: 350 INJECTION, SOLUTION INTRAVENOUS at 16:13

## 2024-03-21 ASSESSMENT — ENCOUNTER SYMPTOMS
OCCASIONAL FEELINGS OF UNSTEADINESS: 0
LOSS OF SENSATION IN FEET: 0
DEPRESSION: 0

## 2024-03-21 ASSESSMENT — PAIN SCALES - GENERAL: PAINLEVEL: 5

## 2024-03-21 NOTE — PROGRESS NOTES
Subjective   Patient ID: Alexandria Rai is a 83 y.o. female who presents for Pneumonia (Pt son states that patient has had pneumonia for over a month but it is still lingering. Pt also had knee surgery last month as well. Pt having issues with bitter taste in mouth, dizziness and wheezing.).    HPI Having home PT, nursing advise to follow up today due to productive cough, mid chest pain. Son reports is slight improving with ambulation right knee surgery. Was anemic and had iron infusions. Also mild constipation due to pain meds. Is not drinking enough fluid    Review of Systems    Objective   /60   Pulse 67   Temp 37.1 °C (98.7 °F)   Resp 18   Ht 1.524 m (5')   Wt 65.8 kg (145 lb)   SpO2 98%   BMI 28.32 kg/m²     Physical Exam  Constitutional:       General: She is not in acute distress.  HENT:      Mouth/Throat:      Mouth: Mucous membranes are dry.   Cardiovascular:      Rate and Rhythm: Normal rate and regular rhythm.      Heart sounds: No murmur heard.  Pulmonary:      Breath sounds: Rhonchi present. No wheezing.   Chest:      Chest wall: Tenderness present.   Abdominal:      Palpations: Abdomen is soft.   Musculoskeletal:      Cervical back: Normal range of motion.      Comments: Using cane for ambulation    Skin:     General: Skin is warm.   Neurological:      Mental Status: She is alert.         Assessment/Plan   Problem List Items Addressed This Visit             ICD-10-CM    Anemia D64.9    Relevant Orders    CBC and Auto Differential    Iron and TIBC     Other Visit Diagnoses         Codes    Pneumonia due to adenovirus    -  Primary J12.0    Relevant Medications    azithromycin (Zithromax) 250 mg tablet    Other Relevant Orders    CT angio chest for pulmonary embolism    D-Dimer, Non VTE    Chest pain on breathing     R07.1    Relevant Orders    CT angio chest for pulmonary embolism    D-Dimer, Non VTE    Weakness generalized     R53.1    Relevant Orders    POCT UA Automated manually  resulted (Completed)    Urine Culture    Other cough     R05.8    Thrush     B37.0    Relevant Medications    nystatin (Mycostatin) 100,000 unit/mL suspension    Wheezing     R06.2    Relevant Medications    albuterol (Ventolin HFA) 90 mcg/actuation inhaler

## 2024-03-22 ENCOUNTER — TELEPHONE (OUTPATIENT)
Dept: PRIMARY CARE | Facility: CLINIC | Age: 84
End: 2024-03-22
Payer: COMMERCIAL

## 2024-03-22 DIAGNOSIS — J13: ICD-10-CM

## 2024-03-22 LAB — D DIMER PPP FEU-MCNC: 5.85 MG/L FEU (ref 0.19–0.5)

## 2024-04-23 ENCOUNTER — PHARMACY VISIT (OUTPATIENT)
Dept: PHARMACY | Facility: CLINIC | Age: 84
End: 2024-04-23
Payer: COMMERCIAL

## 2024-04-23 PROCEDURE — RXMED WILLOW AMBULATORY MEDICATION CHARGE

## 2024-05-11 NOTE — CARE PLAN
Emergency Department      Chief Complaint   Patient presents with    Abdominal Pain    Vomiting        HPI:    Patient is a 40-year-old male with history of appendectomy, hypertension presenting to the emergency department with sudden onset of mid epigastric and right upper quadrant pain.  At 3 PM patient was in his normal state of health, ate a large bowl of macaroni and cheese, approximately 1 hour later started to have mid epigastric pain that slowly got worse.  Wife is bedside, reports patient has been very uncomfortable, rolling around unable to control the pain over the last few hours leading to his ED visit here.  He feels nauseous, has not vomiting.  Denies any urinary symptoms, denies flank pain    Review of Systems   Constitutional:  Negative for chills and fever.   HENT:  Negative for congestion and voice change.    Eyes:  Negative for photophobia and visual disturbance.   Respiratory:  Negative for cough and shortness of breath.    Cardiovascular:  Negative for chest pain and leg swelling.   Gastrointestinal:  Positive for abdominal pain and nausea. Negative for vomiting.   Genitourinary:  Negative for difficulty urinating and dysuria.   Musculoskeletal:  Negative for arthralgias and back pain.   Skin:  Negative for rash and wound.   Neurological:  Negative for dizziness, weakness and numbness.   Psychiatric/Behavioral:  Negative for confusion. The patient is not nervous/anxious.    All other systems reviewed and are negative.         PAST MEDICAL HISTORY:  There is no previous medical history on file.    PAST SURGICAL HISTORY:  There is no previous surgical history on file.    Social History     Tobacco Use    Smoking status: Never        No family history on file.    Physical Exam  Vitals and nursing note reviewed.   Constitutional:       Comments: Significantly uncomfortable appearing   HENT:      Head: Normocephalic and atraumatic.      Right Ear: External ear normal.      Left Ear: External ear  The patient's goals for the shift include  manage pain, work with therapy, rest.    The clinical goals for the shift include manage pain, monitor BP, comfort and safety, encourage ambulation, promote rest.    No barriers to meeting these goals.       Problem: Pain  Goal: My pain/discomfort is manageable  Outcome: Progressing     Problem: Safety  Goal: Patient will be injury free during hospitalization  Outcome: Progressing  Goal: I will remain free of falls  Outcome: Progressing     Problem: Daily Care  Goal: Daily care needs are met  Outcome: Progressing     Problem: Psychosocial Needs  Goal: Demonstrates ability to cope with hospitalization/illness  Outcome: Progressing  Goal: Collaborate with me, my family, and caregiver to identify my specific goals  Outcome: Progressing     Problem: Discharge Barriers  Goal: My discharge needs are met  Outcome: Progressing     Problem: Fall/Injury  Goal: Not fall by end of shift  Outcome: Progressing  Goal: Be free from injury by end of the shift  Outcome: Progressing  Goal: Verbalize understanding of personal risk factors for fall in the hospital  Outcome: Progressing  Goal: Verbalize understanding of risk factor reduction measures to prevent injury from fall in the home  Outcome: Progressing  Goal: Use assistive devices by end of the shift  Outcome: Progressing  Goal: Pace activities to prevent fatigue by end of the shift  Outcome: Progressing     Problem: Pain  Goal: Takes deep breaths with improved pain control throughout the shift  Outcome: Progressing  Goal: Turns in bed with improved pain control throughout the shift  Outcome: Progressing  Goal: Walks with improved pain control throughout the shift  Outcome: Progressing  Goal: Performs ADL's with improved pain control throughout shift  Outcome: Progressing  Goal: Participates in PT with improved pain control throughout the shift  Outcome: Progressing  Goal: Free from opioid side effects throughout the shift  Outcome:  Progressing  Goal: Free from acute confusion related to pain meds throughout the shift  Outcome: Progressing      normal.      Nose: Nose normal.      Mouth/Throat:      Mouth: Mucous membranes are moist.      Pharynx: Oropharynx is clear.   Eyes:      Conjunctiva/sclera: Conjunctivae normal.      Pupils: Pupils are equal, round, and reactive to light.   Cardiovascular:      Rate and Rhythm: Normal rate and regular rhythm.   Pulmonary:      Effort: Pulmonary effort is normal.      Breath sounds: Normal breath sounds.   Abdominal:      General: Abdomen is flat. There is no distension.      Comments: Severe pain to the right upper quadrant area, soft, soft, nondistended, nontender   Musculoskeletal:         General: No signs of injury. Normal range of motion.      Cervical back: Neck supple. No rigidity.   Skin:     General: Skin is warm and dry.   Neurological:      General: No focal deficit present.      Mental Status: He is alert and oriented to person, place, and time.   Psychiatric:         Mood and Affect: Mood normal.         Behavior: Behavior normal.              Labwork:    Results for orders placed or performed during the hospital encounter of 05/10/24   Comprehensive Metabolic Panel   Result Value    Fasting Status     Sodium 136    Potassium 3.3 (L)     Comment: Slight hemolysis, result may be falsely increased.    Chloride 103    Carbon Dioxide 26    Anion Gap 10    Glucose 86    BUN 18    Creatinine 1.02    Glomerular Filtration Rate >90     Comment: eGFR results = or >60 mL/min/1.73m2 = Normal kidney function. Estimated GFR calculated using the CKD-EPI-R (2021) equation that does not include race in the creatinine calculation.    BUN/Cr 18    Calcium 9.9    Bilirubin, Total 0.7    GOT/AST 32     Comment: Slight hemolysis, result may be falsely increased.    GPT/ALT 39    Alkaline Phosphatase 57    Albumin 4.5    Protein, Total 7.9    Globulin 3.4    A/G Ratio 1.3   Lipase   Result Value    Lipase 62   CBC with Automated Differential (performable only)   Result Value    WBC 8.0    RBC 5.93 (H)    HGB 18.2 (H)     HCT 52.4 (H)    MCV 88.4    MCH 30.7    MCHC 34.7    RDW-CV 12.4    RDW-SD 40.8        NRBC 0    Neutrophil, Percent 59    Lymphocytes, Percent 30    Mono, Percent 9    Eosinophils, Percent 1    Basophils, Percent 1    Immature Granulocytes 0    Absolute Neutrophils 4.6    Absolute Lymphocytes 2.4    Absolute Monocytes 0.7    Absolute Eosinophils  0.1    Absolute Basophils 0.1    Absolute Immature Granulocytes 0.0        Imaging Results              US LIVER GALLBLADDER PANCREAS (RUQ) (In process)                       Medications   piperacillin-tazobactam (ZOSYN) 3.375 g in dextrose 5% 50 mL IVPB premix (has no administration in time range)   piperacillin-tazobactam (ZOSYN) 3.375 g in dextrose 5% 50 mL IVPB premix (has no administration in time range)   indocyanine green (IC-GREEN) injection 2.5 mg (has no administration in time range)   dextrose 5 % / lactated ringers infusion (has no administration in time range)   lactated ringers bolus 1,000 mL (0 mLs Intravenous Completed 5/11/24 0022)   famotidine (PEPCID) injection 20 mg (20 mg Intravenous Given 5/10/24 2333)   ondansetron (ZOFRAN) injection 4 mg (4 mg Intravenous Given 5/10/24 2333)   morphine injection 4 mg (4 mg Intravenous Given 5/10/24 2332)   morphine injection 4 mg (4 mg Intravenous Given 5/11/24 0009)   ketorolac (TORADOL) injection 15 mg (15 mg Intravenous Given 5/11/24 0105)   ondansetron (ZOFRAN) injection 4 mg (4 mg Intravenous Given 5/11/24 0112)        Vitals:    05/10/24 2311 05/11/24 0025 05/11/24 0034 05/11/24 0137   BP: (!) 167/109 (!) 136/90 (!) 147/99 (!) 156/98   BP Location: RUE - Right upper extremity RUE - Right upper extremity LUE - Left upper extremity LUE - Left upper extremity   Patient Position: Sitting/High-Ramos's Sitting/High-Ramos's Sitting/High-Ramos's Sitting/High-Ramos's   Pulse: (!) 100 66 89 85   Resp: 20 18 17 19   Temp: 97.7 °F (36.5 °C)      TempSrc: Oral      SpO2: 100% 97% 100% 100%         Diagnostic  Testing Interpretation:    Labs, right upper quadrant ultrasound independently interpreted by me showing likely acute early cholecystitis with biliary sludge, cholelithiasis, distended gallbladder.  No transaminitis, no leukocytosis      MDM:    Multiple differential diagnoses were considered for this patient including but not limited to acute pancreatitis, cholecystitis, obstruction, pyelonephritis.     History limited by acute pain, collateral history obtained by patient's wife who is bedside    Patient is a 40-year-old male presenting with acute pain in his mid epigastrium and right upper quadrant.  He is having severe tenderness in the midepigastric and right upper quadrant.  Denies flank pain, the rest the abdomen is soft and nondistended.  IV was established by nursing staff, patient started on IV fluids, given morphine, famotidine and Zofran for symptom relief.  Additional labs obtained.  Right upper quadrant ultrasound ordered for evaluation of possible cholecystitis/cholelithiasis.    ED Course as of 05/11/24 0250   Sat May 11, 2024   0223 Right upper quadrant ultrasound showing a distended gallbladder with biliary sludge, stones.  Patient pain is somewhat resolved however still has reproducible right upper quadrant pain.  Discussed with Dr. Celaya from general surgery regarding concern for early cholecystitis versus symptomatic cholelithiasis [RL]   0226 Preliminary ultrasound report of the right upper quadrant reviewed, per [facility] there is cholelithiasis with sludge, numerous gallstones within the gallbladder, gallbladder distended up to 12.6 cm in length. [RL]   0243 Discussed with general surgery, plan for cholecystectomy later this morning.  N.p.o. until then.  Zosyn ordered for broad-spectrum antibiotic coverage. [RL]   0250 Discussed with [facility] hospitalist for admission for acute cholecystitis and pending surgical intervention. [RL]      ED Course User Index  [RL] Dean See,         ED Diagnoses       Diagnosis Comment Associated Orders       Final diagnoses    Acute cholecystitis -- --    Abdominal pain, acute, epigastric -- --             No follow-up provider specified.       Summary of your Discharge Medications      You have not been prescribed any medications.               Dean See,   05/11/24 0250

## 2024-05-22 PROCEDURE — RXMED WILLOW AMBULATORY MEDICATION CHARGE

## 2024-05-23 ENCOUNTER — PHARMACY VISIT (OUTPATIENT)
Dept: PHARMACY | Facility: CLINIC | Age: 84
End: 2024-05-23
Payer: COMMERCIAL

## 2024-06-04 ENCOUNTER — PHARMACY VISIT (OUTPATIENT)
Dept: PHARMACY | Facility: CLINIC | Age: 84
End: 2024-06-04
Payer: COMMERCIAL

## 2024-06-04 ENCOUNTER — OFFICE VISIT (OUTPATIENT)
Dept: PRIMARY CARE | Facility: CLINIC | Age: 84
End: 2024-06-04
Payer: COMMERCIAL

## 2024-06-04 VITALS
DIASTOLIC BLOOD PRESSURE: 62 MMHG | OXYGEN SATURATION: 98 % | HEART RATE: 64 BPM | BODY MASS INDEX: 28.71 KG/M2 | SYSTOLIC BLOOD PRESSURE: 116 MMHG | WEIGHT: 147 LBS | RESPIRATION RATE: 18 BRPM

## 2024-06-04 DIAGNOSIS — R35.0 URINARY FREQUENCY: ICD-10-CM

## 2024-06-04 DIAGNOSIS — R30.0 BURNING WITH URINATION: ICD-10-CM

## 2024-06-04 LAB
POC APPEARANCE, URINE: ABNORMAL
POC BILIRUBIN, URINE: NEGATIVE
POC BLOOD, URINE: ABNORMAL
POC COLOR, URINE: YELLOW
POC GLUCOSE, URINE: NEGATIVE MG/DL
POC KETONES, URINE: NEGATIVE MG/DL
POC LEUKOCYTES, URINE: ABNORMAL
POC NITRITE,URINE: NEGATIVE
POC PH, URINE: 7 PH
POC PROTEIN, URINE: ABNORMAL MG/DL
POC SPECIFIC GRAVITY, URINE: 1.02
POC UROBILINOGEN, URINE: 1 EU/DL

## 2024-06-04 PROCEDURE — 81003 URINALYSIS AUTO W/O SCOPE: CPT | Performed by: NURSE PRACTITIONER

## 2024-06-04 PROCEDURE — 99213 OFFICE O/P EST LOW 20 MIN: CPT | Performed by: NURSE PRACTITIONER

## 2024-06-04 PROCEDURE — 1159F MED LIST DOCD IN RCRD: CPT | Performed by: NURSE PRACTITIONER

## 2024-06-04 PROCEDURE — 87086 URINE CULTURE/COLONY COUNT: CPT | Mod: WESLAB | Performed by: NURSE PRACTITIONER

## 2024-06-04 PROCEDURE — 1126F AMNT PAIN NOTED NONE PRSNT: CPT | Performed by: NURSE PRACTITIONER

## 2024-06-04 PROCEDURE — 1036F TOBACCO NON-USER: CPT | Performed by: NURSE PRACTITIONER

## 2024-06-04 PROCEDURE — 3074F SYST BP LT 130 MM HG: CPT | Performed by: NURSE PRACTITIONER

## 2024-06-04 PROCEDURE — 3078F DIAST BP <80 MM HG: CPT | Performed by: NURSE PRACTITIONER

## 2024-06-04 PROCEDURE — RXMED WILLOW AMBULATORY MEDICATION CHARGE

## 2024-06-04 RX ORDER — NITROFURANTOIN 25; 75 MG/1; MG/1
100 CAPSULE ORAL 2 TIMES DAILY
Qty: 14 CAPSULE | Refills: 0 | Status: SHIPPED | OUTPATIENT
Start: 2024-06-04 | End: 2024-06-11

## 2024-06-04 ASSESSMENT — PAIN SCALES - GENERAL: PAINLEVEL: 0-NO PAIN

## 2024-06-04 ASSESSMENT — ENCOUNTER SYMPTOMS
LOSS OF SENSATION IN FEET: 0
DEPRESSION: 0
OCCASIONAL FEELINGS OF UNSTEADINESS: 0

## 2024-06-04 NOTE — PROGRESS NOTES
Subjective   Patient ID: Alexandria Rai is a 84 y.o. female who presents for UTI (Pt son states that she has had urine frequency and burning with urination for over a week now. ).  Pt with son translation, he states she denies having incontience or wearing depends pads , using bathroom frequently prior to daily praying discussed likely not using proper wiping hygiene. No vaginal infection.   Discuss supplement cranberry tablet or juice or dried cranberry to help with urinary acidity  UTI          Review of Systems    Objective   /62   Pulse 64   Resp 18   Wt 66.7 kg (147 lb)   SpO2 98%   BMI 28.71 kg/m²     Physical Exam  Constitutional:       General: She is not in acute distress.     Appearance: Normal appearance.   Cardiovascular:      Rate and Rhythm: Normal rate and regular rhythm.      Heart sounds: No murmur heard.  Pulmonary:      Breath sounds: Normal breath sounds. No wheezing.   Abdominal:      Palpations: Abdomen is soft.   Musculoskeletal:      Comments: Using cane for ambulation    Neurological:      Mental Status: She is alert.         Assessment/Plan   Problem List Items Addressed This Visit    None  Visit Diagnoses         Codes    Urinary frequency     R35.0    Relevant Medications    nitrofurantoin, macrocrystal-monohydrate, (Macrobid) 100 mg capsule    Other Relevant Orders    POCT UA Automated manually resulted (Completed)    Urine Culture    Burning with urination     R30.0    Relevant Medications    nitrofurantoin, macrocrystal-monohydrate, (Macrobid) 100 mg capsule    Other Relevant Orders    POCT UA Automated manually resulted (Completed)    Urine Culture

## 2024-06-06 LAB — BACTERIA UR CULT: NORMAL

## 2024-06-26 DIAGNOSIS — E55.9 VITAMIN D DEFICIENCY: ICD-10-CM

## 2024-06-28 ENCOUNTER — PHARMACY VISIT (OUTPATIENT)
Dept: PHARMACY | Facility: CLINIC | Age: 84
End: 2024-06-28
Payer: COMMERCIAL

## 2024-06-28 ENCOUNTER — OFFICE VISIT (OUTPATIENT)
Dept: PRIMARY CARE | Facility: CLINIC | Age: 84
End: 2024-06-28
Payer: COMMERCIAL

## 2024-06-28 VITALS
BODY MASS INDEX: 28.86 KG/M2 | HEIGHT: 60 IN | RESPIRATION RATE: 18 BRPM | DIASTOLIC BLOOD PRESSURE: 66 MMHG | SYSTOLIC BLOOD PRESSURE: 118 MMHG | OXYGEN SATURATION: 96 % | WEIGHT: 147 LBS | HEART RATE: 83 BPM | TEMPERATURE: 96.6 F

## 2024-06-28 DIAGNOSIS — J02.9 PHARYNGITIS, UNSPECIFIED ETIOLOGY: Primary | ICD-10-CM

## 2024-06-28 DIAGNOSIS — H10.13 ALLERGIC CONJUNCTIVITIS OF BOTH EYES: ICD-10-CM

## 2024-06-28 PROBLEM — R53.1 WEAKNESS: Status: ACTIVE | Noted: 2024-06-28

## 2024-06-28 PROBLEM — D50.0 IRON DEFICIENCY ANEMIA DUE TO CHRONIC BLOOD LOSS: Status: ACTIVE | Noted: 2024-06-28

## 2024-06-28 PROCEDURE — RXMED WILLOW AMBULATORY MEDICATION CHARGE

## 2024-06-28 PROCEDURE — 3078F DIAST BP <80 MM HG: CPT | Performed by: FAMILY MEDICINE

## 2024-06-28 PROCEDURE — 1036F TOBACCO NON-USER: CPT | Performed by: FAMILY MEDICINE

## 2024-06-28 PROCEDURE — 99213 OFFICE O/P EST LOW 20 MIN: CPT | Performed by: FAMILY MEDICINE

## 2024-06-28 PROCEDURE — 1159F MED LIST DOCD IN RCRD: CPT | Performed by: FAMILY MEDICINE

## 2024-06-28 PROCEDURE — 3074F SYST BP LT 130 MM HG: CPT | Performed by: FAMILY MEDICINE

## 2024-06-28 PROCEDURE — 1126F AMNT PAIN NOTED NONE PRSNT: CPT | Performed by: FAMILY MEDICINE

## 2024-06-28 RX ORDER — ERGOCALCIFEROL 1.25 MG/1
1 CAPSULE ORAL
Qty: 12 CAPSULE | Refills: 0 | Status: SHIPPED | OUTPATIENT
Start: 2024-06-30

## 2024-06-28 RX ORDER — AMOXICILLIN AND CLAVULANATE POTASSIUM 875; 125 MG/1; MG/1
875 TABLET, FILM COATED ORAL 2 TIMES DAILY
Qty: 20 TABLET | Refills: 0 | Status: SHIPPED | OUTPATIENT
Start: 2024-06-28 | End: 2024-07-08

## 2024-06-28 RX ORDER — OLOPATADINE HYDROCHLORIDE 1 MG/ML
1 SOLUTION/ DROPS OPHTHALMIC 2 TIMES DAILY PRN
Qty: 5 ML | Refills: 3 | Status: SHIPPED | OUTPATIENT
Start: 2024-06-28 | End: 2024-10-26

## 2024-06-28 ASSESSMENT — ENCOUNTER SYMPTOMS
OCCASIONAL FEELINGS OF UNSTEADINESS: 0
SORE THROAT: 1
COUGH: 1
FATIGUE: 1
DEPRESSION: 0
LOSS OF SENSATION IN FEET: 0

## 2024-06-28 ASSESSMENT — PAIN SCALES - GENERAL: PAINLEVEL: 0-NO PAIN

## 2024-06-28 NOTE — PROGRESS NOTES
Subjective   Patient ID: Alexandria Rai is a 84 y.o. female who presents for Cough, Sore Throat, and Fatigue.    Cough  Associated symptoms include a sore throat.   Sore Throat   Associated symptoms include coughing.   Fatigue  Associated symptoms include coughing, fatigue and a sore throat.        Review of Systems   Constitutional:  Positive for fatigue.   HENT:  Positive for sore throat.    Respiratory:  Positive for cough.        Objective   /66   Pulse 83   Temp 35.9 °C (96.6 °F)   Resp 18   Ht 1.524 m (5')   Wt 66.7 kg (147 lb)   SpO2 96%   BMI 28.71 kg/m²     Physical Exam  Constitutional:       General: She is not in acute distress.     Appearance: Normal appearance.   Cardiovascular:      Rate and Rhythm: Normal rate and regular rhythm.      Heart sounds: No murmur heard.  Pulmonary:      Breath sounds: Normal breath sounds. No wheezing.   Neurological:      Mental Status: She is alert.     Pharynx:  erythemaotous w exudate    Assessment/Plan   Problem List Items Addressed This Visit             ICD-10-CM    Pharyngitis - Primary J02.9    Relevant Medications    amoxicillin-pot clavulanate (Augmentin) 875-125 mg tablet    Allergic conjunctivitis of both eyes H10.13    Relevant Medications    olopatadine (Patanol) 0.1 % ophthalmic solution

## 2024-06-28 NOTE — PROGRESS NOTES
Subjective   Patient ID: Alexandria Rai is a 84 y.o. female who presents for Cough, Sore Throat, and Fatigue.    HPI pt c/o throat burning and having a cough with production     Review of Systems    Objective   /66   Pulse 83   Temp 35.9 °C (96.6 °F)   Resp 18   Ht 1.524 m (5')   Wt 66.7 kg (147 lb)   SpO2 96%   BMI 28.71 kg/m²     Physical Exam    Assessment/Plan

## 2024-07-17 ENCOUNTER — APPOINTMENT (OUTPATIENT)
Dept: CARDIOLOGY | Facility: CLINIC | Age: 84
End: 2024-07-17
Payer: COMMERCIAL

## 2024-07-26 PROCEDURE — RXMED WILLOW AMBULATORY MEDICATION CHARGE

## 2024-07-29 ENCOUNTER — PHARMACY VISIT (OUTPATIENT)
Dept: PHARMACY | Facility: CLINIC | Age: 84
End: 2024-07-29
Payer: COMMERCIAL

## 2024-07-31 ENCOUNTER — APPOINTMENT (OUTPATIENT)
Dept: CARDIOLOGY | Facility: CLINIC | Age: 84
End: 2024-07-31
Payer: COMMERCIAL

## 2024-08-26 PROCEDURE — RXMED WILLOW AMBULATORY MEDICATION CHARGE

## 2024-08-27 ENCOUNTER — PHARMACY VISIT (OUTPATIENT)
Dept: PHARMACY | Facility: CLINIC | Age: 84
End: 2024-08-27
Payer: COMMERCIAL

## 2024-09-20 ENCOUNTER — DOCUMENTATION (OUTPATIENT)
Dept: PHYSICAL THERAPY | Facility: CLINIC | Age: 84
End: 2024-09-20
Payer: COMMERCIAL

## 2024-09-20 DIAGNOSIS — M25.562 CHRONIC PAIN OF BOTH KNEES: Primary | ICD-10-CM

## 2024-09-20 DIAGNOSIS — G89.29 CHRONIC PAIN OF BOTH KNEES: Primary | ICD-10-CM

## 2024-09-20 DIAGNOSIS — M25.561 CHRONIC PAIN OF BOTH KNEES: Primary | ICD-10-CM

## 2024-09-20 NOTE — PROGRESS NOTES
Physical Therapy    Discharge Summary    Name: Alexandria Rai  MRN: [id number]  : 1940  Date: 24    Discharge Summary: PT    Discharge Information: Date of discharge 24, Date of last visit 24, and Date of evaluation 23    Therapy Summary: Pt was seen for 10 visits, good progress. LTG's met       Rehab Discharge Reason: Achieved all and/or the most significant goals(s)

## 2024-10-07 PROCEDURE — RXMED WILLOW AMBULATORY MEDICATION CHARGE

## 2024-10-10 ENCOUNTER — PHARMACY VISIT (OUTPATIENT)
Dept: PHARMACY | Facility: CLINIC | Age: 84
End: 2024-10-10
Payer: COMMERCIAL

## 2024-12-04 PROCEDURE — RXMED WILLOW AMBULATORY MEDICATION CHARGE

## 2024-12-05 ENCOUNTER — PHARMACY VISIT (OUTPATIENT)
Dept: PHARMACY | Facility: CLINIC | Age: 84
End: 2024-12-05
Payer: COMMERCIAL

## 2024-12-30 ENCOUNTER — PHARMACY VISIT (OUTPATIENT)
Dept: PHARMACY | Facility: CLINIC | Age: 84
End: 2024-12-30
Payer: COMMERCIAL

## 2024-12-30 ENCOUNTER — OFFICE VISIT (OUTPATIENT)
Dept: URGENT CARE | Age: 84
End: 2024-12-30
Payer: COMMERCIAL

## 2024-12-30 VITALS
OXYGEN SATURATION: 96 % | BODY MASS INDEX: 28.71 KG/M2 | HEART RATE: 87 BPM | TEMPERATURE: 97.7 F | DIASTOLIC BLOOD PRESSURE: 83 MMHG | SYSTOLIC BLOOD PRESSURE: 128 MMHG | RESPIRATION RATE: 20 BRPM | WEIGHT: 147 LBS

## 2024-12-30 DIAGNOSIS — J40 BRONCHITIS: Primary | ICD-10-CM

## 2024-12-30 DIAGNOSIS — H10.32 ACUTE BACTERIAL CONJUNCTIVITIS OF LEFT EYE: ICD-10-CM

## 2024-12-30 PROCEDURE — 99203 OFFICE O/P NEW LOW 30 MIN: CPT | Performed by: NURSE PRACTITIONER

## 2024-12-30 PROCEDURE — 3079F DIAST BP 80-89 MM HG: CPT | Performed by: NURSE PRACTITIONER

## 2024-12-30 PROCEDURE — 1159F MED LIST DOCD IN RCRD: CPT | Performed by: NURSE PRACTITIONER

## 2024-12-30 PROCEDURE — RXMED WILLOW AMBULATORY MEDICATION CHARGE

## 2024-12-30 PROCEDURE — 3074F SYST BP LT 130 MM HG: CPT | Performed by: NURSE PRACTITIONER

## 2024-12-30 RX ORDER — TOBRAMYCIN AND DEXAMETHASONE 3; 1 MG/ML; MG/ML
1 SUSPENSION/ DROPS OPHTHALMIC
Qty: 5 ML | Refills: 0 | Status: SHIPPED | OUTPATIENT
Start: 2024-12-30 | End: 2025-01-19

## 2024-12-30 RX ORDER — AZITHROMYCIN 250 MG/1
TABLET, FILM COATED ORAL
Qty: 6 TABLET | Refills: 0 | Status: SHIPPED | OUTPATIENT
Start: 2024-12-30 | End: 2025-01-04

## 2024-12-31 ASSESSMENT — ENCOUNTER SYMPTOMS
EYE PAIN: 1
COUGH: 1
EYE DISCHARGE: 1
EYE REDNESS: 1

## 2025-01-13 DIAGNOSIS — I10 HYPERTENSION, UNSPECIFIED TYPE: ICD-10-CM

## 2025-01-13 PROCEDURE — RXMED WILLOW AMBULATORY MEDICATION CHARGE

## 2025-01-13 RX ORDER — DILTIAZEM HYDROCHLORIDE 120 MG/1
120 CAPSULE, COATED, EXTENDED RELEASE ORAL 2 TIMES DAILY
Qty: 180 CAPSULE | Refills: 3 | Status: SHIPPED | OUTPATIENT
Start: 2025-01-13 | End: 2026-01-08

## 2025-01-21 ENCOUNTER — PHARMACY VISIT (OUTPATIENT)
Dept: PHARMACY | Facility: CLINIC | Age: 85
End: 2025-01-21
Payer: COMMERCIAL

## 2025-01-21 ENCOUNTER — HOSPITAL ENCOUNTER (OUTPATIENT)
Dept: RADIOLOGY | Facility: HOSPITAL | Age: 85
Discharge: HOME | End: 2025-01-21
Payer: COMMERCIAL

## 2025-01-21 ENCOUNTER — OFFICE VISIT (OUTPATIENT)
Dept: PRIMARY CARE | Facility: CLINIC | Age: 85
End: 2025-01-21
Payer: COMMERCIAL

## 2025-01-21 VITALS — SYSTOLIC BLOOD PRESSURE: 110 MMHG | DIASTOLIC BLOOD PRESSURE: 76 MMHG | HEART RATE: 65 BPM

## 2025-01-21 DIAGNOSIS — N30.91 HEMATURIA DUE TO CYSTITIS: ICD-10-CM

## 2025-01-21 DIAGNOSIS — H10.13 ALLERGIC CONJUNCTIVITIS OF BOTH EYES: ICD-10-CM

## 2025-01-21 DIAGNOSIS — R93.89 ABNORMAL CHEST X-RAY: ICD-10-CM

## 2025-01-21 DIAGNOSIS — R05.8 OTHER COUGH: ICD-10-CM

## 2025-01-21 DIAGNOSIS — R30.0 BURNING WITH URINATION: Primary | ICD-10-CM

## 2025-01-21 DIAGNOSIS — R06.2 WHEEZING: ICD-10-CM

## 2025-01-21 DIAGNOSIS — R10.9 BILATERAL FLANK PAIN: ICD-10-CM

## 2025-01-21 DIAGNOSIS — R41.3 MEMORY LOSS: ICD-10-CM

## 2025-01-21 LAB
POC APPEARANCE, URINE: CLEAR
POC BILIRUBIN, URINE: NEGATIVE
POC BLOOD, URINE: ABNORMAL
POC COLOR, URINE: YELLOW
POC GLUCOSE, URINE: NEGATIVE MG/DL
POC KETONES, URINE: NEGATIVE MG/DL
POC LEUKOCYTES, URINE: NEGATIVE
POC NITRITE,URINE: NEGATIVE
POC PH, URINE: 7 PH
POC PROTEIN, URINE: NEGATIVE MG/DL
POC SPECIFIC GRAVITY, URINE: 1.01
POC UROBILINOGEN, URINE: 0.2 EU/DL

## 2025-01-21 PROCEDURE — 71046 X-RAY EXAM CHEST 2 VIEWS: CPT

## 2025-01-21 PROCEDURE — 1159F MED LIST DOCD IN RCRD: CPT | Performed by: NURSE PRACTITIONER

## 2025-01-21 PROCEDURE — 87086 URINE CULTURE/COLONY COUNT: CPT | Performed by: NURSE PRACTITIONER

## 2025-01-21 PROCEDURE — 3074F SYST BP LT 130 MM HG: CPT | Performed by: NURSE PRACTITIONER

## 2025-01-21 PROCEDURE — 3078F DIAST BP <80 MM HG: CPT | Performed by: NURSE PRACTITIONER

## 2025-01-21 PROCEDURE — 99214 OFFICE O/P EST MOD 30 MIN: CPT | Performed by: NURSE PRACTITIONER

## 2025-01-21 PROCEDURE — 71046 X-RAY EXAM CHEST 2 VIEWS: CPT | Performed by: RADIOLOGY

## 2025-01-21 PROCEDURE — RXMED WILLOW AMBULATORY MEDICATION CHARGE

## 2025-01-21 PROCEDURE — G2211 COMPLEX E/M VISIT ADD ON: HCPCS | Performed by: NURSE PRACTITIONER

## 2025-01-21 PROCEDURE — 76770 US EXAM ABDO BACK WALL COMP: CPT

## 2025-01-21 PROCEDURE — 76770 US EXAM ABDO BACK WALL COMP: CPT | Performed by: RADIOLOGY

## 2025-01-21 PROCEDURE — 81003 URINALYSIS AUTO W/O SCOPE: CPT | Mod: QW | Performed by: NURSE PRACTITIONER

## 2025-01-21 RX ORDER — AZELASTINE HYDROCHLORIDE 0.5 MG/ML
1 SOLUTION/ DROPS OPHTHALMIC DAILY
Qty: 6 ML | Refills: 2 | Status: SHIPPED | OUTPATIENT
Start: 2025-01-21 | End: 2026-01-21

## 2025-01-21 RX ORDER — OLOPATADINE HYDROCHLORIDE 1 MG/ML
1 SOLUTION/ DROPS OPHTHALMIC 2 TIMES DAILY PRN
Qty: 5 ML | Refills: 3 | Status: SHIPPED | OUTPATIENT
Start: 2025-01-21 | End: 2025-01-23

## 2025-01-21 RX ORDER — ALBUTEROL SULFATE 90 UG/1
2 INHALANT RESPIRATORY (INHALATION) EVERY 4 HOURS PRN
Qty: 8 G | Refills: 11 | Status: SHIPPED | OUTPATIENT
Start: 2025-01-21 | End: 2026-01-21

## 2025-01-21 RX ORDER — DONEPEZIL HYDROCHLORIDE 5 MG/1
5 TABLET, FILM COATED ORAL NIGHTLY
Qty: 30 TABLET | Refills: 5 | Status: SHIPPED | OUTPATIENT
Start: 2025-01-21 | End: 2025-07-20

## 2025-01-21 RX ORDER — ERYTHROMYCIN 5 MG/G
OINTMENT OPHTHALMIC 4 TIMES DAILY
Qty: 3.5 G | Refills: 0 | Status: SHIPPED | OUTPATIENT
Start: 2025-01-21 | End: 2025-01-28

## 2025-01-21 RX ORDER — ALBUTEROL SULFATE 90 UG/1
2 INHALANT RESPIRATORY (INHALATION) EVERY 4 HOURS PRN
Qty: 8.5 G | Refills: 11 | Status: SHIPPED | OUTPATIENT
Start: 2025-01-21 | End: 2026-01-21

## 2025-01-21 RX ORDER — AMOXICILLIN AND CLAVULANATE POTASSIUM 875; 125 MG/1; MG/1
875 TABLET, FILM COATED ORAL 2 TIMES DAILY
Qty: 20 TABLET | Refills: 0 | Status: SHIPPED | OUTPATIENT
Start: 2025-01-21 | End: 2025-01-31

## 2025-01-21 NOTE — PROGRESS NOTES
Patient ID: Alexandria Rai is a 84 y.o. female.    ProceduresSubjective   Patient ID: Alexandria Rai is a 84 y.o. female who presents for Cough (Patient here for cough, she was given ATB from urgent care but didn't help much. SOB and coughing thick yellow mucus.), Flank Pain (Has frequency and burning ), and Eye Drainage.    HPI pt accompanied by son her primary caregiver along with his wife. Pt in non english speaking. Muliple concerns left eye scratchy sensation and crusting , eye drop not helping. Also bilateral flank pain concern about kidney stones. Appetite ok. Son reports she is not drinking water.   Additional memory concerns states she is forgetful turning on oven and forgetting does not eat unless food placed in front of her occassionally augumentative     Review of Systems   Constitutional:  Positive for fatigue.   HENT: Negative.     Eyes:  Positive for pain and redness.   Respiratory: Negative.     Cardiovascular: Negative.    Gastrointestinal: Negative.    Genitourinary:  Positive for dysuria.   Musculoskeletal:  Positive for back pain.   Neurological: Negative.    Psychiatric/Behavioral:  Positive for behavioral problems and confusion.        Objective   /76 (BP Location: Left arm, Patient Position: Sitting, BP Cuff Size: Adult)   Pulse 65     Physical Exam  Constitutional:       General: She is not in acute distress.     Appearance: Normal appearance.   HENT:      Mouth/Throat:      Mouth: Mucous membranes are moist.   Eyes:      Comments: Flurostain left eye minor abrasion noted    Cardiovascular:      Rate and Rhythm: Normal rate and regular rhythm.      Heart sounds: No murmur heard.  Pulmonary:      Breath sounds: Normal breath sounds. No wheezing.   Abdominal:      Palpations: Abdomen is soft.   Musculoskeletal:      Comments: Mild bilateral left and right back tenderenss    Neurological:      Mental Status: She is alert and oriented to person, place, and time.      Comments: Gait  unsteady   Psychiatric:         Mood and Affect: Mood normal.         Assessment/Plan   Problem List Items Addressed This Visit             ICD-10-CM    Allergic conjunctivitis of both eyes H10.13    Relevant Medications    olopatadine (Patanol) 0.1 % ophthalmic solution    azelastine (Optivar) 0.05 % ophthalmic solution     Other Visit Diagnoses         Codes    Burning with urination    -  Primary R30.0    Relevant Medications    erythromycin (Romycin) 5 mg/gram (0.5 %) ophthalmic ointment    Other Relevant Orders    POCT UA Automated manually resulted (Completed)    Urine Culture    Hematuria due to cystitis     N30.91    Relevant Orders    US renal complete (Completed)    Urine Culture    Bilateral flank pain     R10.9    Relevant Orders    US renal complete (Completed)    Other cough     R05.8    Relevant Medications    amoxicillin-pot clavulanate (Augmentin) 875-125 mg tablet    Other Relevant Orders    XR chest 2 views (Completed)    Wheezing     R06.2    Relevant Medications    albuterol (Ventolin HFA) 90 mcg/actuation inhaler    albuterol (Ventolin HFA) 90 mcg/actuation inhaler    Memory loss     R41.3    Relevant Medications    donepezil (Aricept) 5 mg tablet    Abnormal chest x-ray     R93.89    Relevant Orders    CT chest w and wo IV contrast    Creatinine, Serum        Discuss Geropsychiatry referral sone decline at this time follow up 4-6 weeks after starting Aricept   Home safety precautions

## 2025-01-22 ASSESSMENT — ENCOUNTER SYMPTOMS
BACK PAIN: 1
EYE REDNESS: 1
EYE PAIN: 1
GASTROINTESTINAL NEGATIVE: 1
CARDIOVASCULAR NEGATIVE: 1
FATIGUE: 1
DYSURIA: 1
CONFUSION: 1
NEUROLOGICAL NEGATIVE: 1
RESPIRATORY NEGATIVE: 1

## 2025-01-23 ENCOUNTER — TELEPHONE (OUTPATIENT)
Dept: PRIMARY CARE | Facility: CLINIC | Age: 85
End: 2025-01-23
Payer: COMMERCIAL

## 2025-01-23 LAB — BACTERIA UR CULT: NORMAL

## 2025-02-25 ENCOUNTER — PHARMACY VISIT (OUTPATIENT)
Dept: PHARMACY | Facility: CLINIC | Age: 85
End: 2025-02-25
Payer: COMMERCIAL

## 2025-02-25 DIAGNOSIS — J30.9 ALLERGIC RHINITIS, UNSPECIFIED SEASONALITY, UNSPECIFIED TRIGGER: ICD-10-CM

## 2025-02-25 PROCEDURE — RXMED WILLOW AMBULATORY MEDICATION CHARGE

## 2025-02-26 PROCEDURE — RXMED WILLOW AMBULATORY MEDICATION CHARGE

## 2025-02-26 RX ORDER — LORATADINE 10 MG/1
10 TABLET ORAL DAILY
Qty: 30 TABLET | Refills: 11 | Status: SHIPPED | OUTPATIENT
Start: 2025-02-26 | End: 2026-02-26

## 2025-02-27 ENCOUNTER — PHARMACY VISIT (OUTPATIENT)
Dept: PHARMACY | Facility: CLINIC | Age: 85
End: 2025-02-27
Payer: COMMERCIAL

## 2025-03-03 ENCOUNTER — PHARMACY VISIT (OUTPATIENT)
Dept: PHARMACY | Facility: CLINIC | Age: 85
End: 2025-03-03
Payer: COMMERCIAL

## 2025-03-03 ENCOUNTER — HOSPITAL ENCOUNTER (OUTPATIENT)
Dept: RADIOLOGY | Facility: CLINIC | Age: 85
Discharge: HOME | End: 2025-03-03
Payer: COMMERCIAL

## 2025-03-03 ENCOUNTER — OFFICE VISIT (OUTPATIENT)
Dept: PRIMARY CARE | Facility: CLINIC | Age: 85
End: 2025-03-03
Payer: COMMERCIAL

## 2025-03-03 VITALS
WEIGHT: 148 LBS | OXYGEN SATURATION: 97 % | HEART RATE: 85 BPM | BODY MASS INDEX: 29.06 KG/M2 | RESPIRATION RATE: 18 BRPM | HEIGHT: 60 IN | TEMPERATURE: 98.2 F | SYSTOLIC BLOOD PRESSURE: 124 MMHG | DIASTOLIC BLOOD PRESSURE: 80 MMHG

## 2025-03-03 DIAGNOSIS — H10.13 ALLERGIC CONJUNCTIVITIS OF BOTH EYES: ICD-10-CM

## 2025-03-03 DIAGNOSIS — J18.9 RECURRENT PNEUMONIA: Primary | ICD-10-CM

## 2025-03-03 DIAGNOSIS — I10 HYPERTENSION, UNSPECIFIED TYPE: ICD-10-CM

## 2025-03-03 DIAGNOSIS — R05.8 OTHER COUGH: ICD-10-CM

## 2025-03-03 DIAGNOSIS — J18.9 RECURRENT PNEUMONIA: ICD-10-CM

## 2025-03-03 DIAGNOSIS — K59.09 OTHER CONSTIPATION: ICD-10-CM

## 2025-03-03 DIAGNOSIS — R06.2 WHEEZING: ICD-10-CM

## 2025-03-03 DIAGNOSIS — I10 PRIMARY HYPERTENSION: ICD-10-CM

## 2025-03-03 DIAGNOSIS — J30.9 ALLERGIC RHINITIS, UNSPECIFIED SEASONALITY, UNSPECIFIED TRIGGER: ICD-10-CM

## 2025-03-03 DIAGNOSIS — R41.3 MEMORY LOSS: ICD-10-CM

## 2025-03-03 DIAGNOSIS — Z01.810 ENCOUNTER FOR PRE-OPERATIVE CARDIOVASCULAR CLEARANCE: ICD-10-CM

## 2025-03-03 DIAGNOSIS — E55.9 VITAMIN D DEFICIENCY: ICD-10-CM

## 2025-03-03 PROCEDURE — 99213 OFFICE O/P EST LOW 20 MIN: CPT | Mod: 25 | Performed by: NURSE PRACTITIONER

## 2025-03-03 PROCEDURE — 3074F SYST BP LT 130 MM HG: CPT | Performed by: NURSE PRACTITIONER

## 2025-03-03 PROCEDURE — 1126F AMNT PAIN NOTED NONE PRSNT: CPT | Performed by: NURSE PRACTITIONER

## 2025-03-03 PROCEDURE — 1159F MED LIST DOCD IN RCRD: CPT | Performed by: NURSE PRACTITIONER

## 2025-03-03 PROCEDURE — 99213 OFFICE O/P EST LOW 20 MIN: CPT | Performed by: NURSE PRACTITIONER

## 2025-03-03 PROCEDURE — RXMED WILLOW AMBULATORY MEDICATION CHARGE

## 2025-03-03 PROCEDURE — G2211 COMPLEX E/M VISIT ADD ON: HCPCS | Performed by: NURSE PRACTITIONER

## 2025-03-03 PROCEDURE — 1124F ACP DISCUSS-NO DSCNMKR DOCD: CPT | Performed by: NURSE PRACTITIONER

## 2025-03-03 PROCEDURE — 3079F DIAST BP 80-89 MM HG: CPT | Performed by: NURSE PRACTITIONER

## 2025-03-03 PROCEDURE — 1036F TOBACCO NON-USER: CPT | Performed by: NURSE PRACTITIONER

## 2025-03-03 PROCEDURE — 71046 X-RAY EXAM CHEST 2 VIEWS: CPT

## 2025-03-03 RX ORDER — LORATADINE 10 MG/1
10 TABLET ORAL DAILY
Qty: 90 TABLET | Refills: 3 | Status: SHIPPED | OUTPATIENT
Start: 2025-03-03 | End: 2026-03-03

## 2025-03-03 RX ORDER — ATENOLOL AND CHLORTHALIDONE TABLET 50; 25 MG/1; MG/1
0.5 TABLET ORAL DAILY
Qty: 45 TABLET | Refills: 3 | Status: SHIPPED | OUTPATIENT
Start: 2025-03-03 | End: 2026-03-03

## 2025-03-03 RX ORDER — ALBUTEROL SULFATE 0.83 MG/ML
2.5 SOLUTION RESPIRATORY (INHALATION) 2 TIMES DAILY PRN
Qty: 180 ML | Refills: 1 | Status: SHIPPED | OUTPATIENT
Start: 2025-03-03 | End: 2026-03-03

## 2025-03-03 RX ORDER — DONEPEZIL HYDROCHLORIDE 5 MG/1
5 TABLET, FILM COATED ORAL NIGHTLY
Qty: 90 TABLET | Refills: 3 | Status: SHIPPED | OUTPATIENT
Start: 2025-03-03 | End: 2026-03-03

## 2025-03-03 RX ORDER — AMOXICILLIN AND CLAVULANATE POTASSIUM 875; 125 MG/1; MG/1
875 TABLET, FILM COATED ORAL 2 TIMES DAILY
Qty: 20 TABLET | Refills: 0 | Status: SHIPPED | OUTPATIENT
Start: 2025-03-03 | End: 2025-03-13

## 2025-03-03 RX ORDER — DILTIAZEM HYDROCHLORIDE 120 MG/1
120 CAPSULE, COATED, EXTENDED RELEASE ORAL 2 TIMES DAILY
Qty: 180 CAPSULE | Refills: 3 | Status: SHIPPED | OUTPATIENT
Start: 2025-03-03 | End: 2026-02-26

## 2025-03-03 RX ORDER — DOCUSATE SODIUM 100 MG/1
100 CAPSULE, LIQUID FILLED ORAL EVERY 12 HOURS
Qty: 180 CAPSULE | Refills: 3 | Status: SHIPPED | OUTPATIENT
Start: 2025-03-03 | End: 2026-03-03

## 2025-03-03 RX ORDER — AZELASTINE HYDROCHLORIDE 0.5 MG/ML
1 SOLUTION/ DROPS OPHTHALMIC DAILY
Qty: 18 ML | Refills: 1 | Status: SHIPPED | OUTPATIENT
Start: 2025-03-03 | End: 2026-03-03

## 2025-03-03 RX ORDER — BENZONATATE 100 MG/1
100 CAPSULE ORAL 3 TIMES DAILY PRN
Qty: 42 CAPSULE | Refills: 0 | Status: SHIPPED | OUTPATIENT
Start: 2025-03-03 | End: 2025-04-02

## 2025-03-03 RX ORDER — ASPIRIN 81 MG/1
81 TABLET ORAL DAILY
COMMUNITY

## 2025-03-03 RX ORDER — ERGOCALCIFEROL 1.25 MG/1
1 CAPSULE ORAL
Qty: 12 CAPSULE | Refills: 1 | Status: SHIPPED | OUTPATIENT
Start: 2025-03-03

## 2025-03-03 ASSESSMENT — ENCOUNTER SYMPTOMS
COUGH: 1
MUSCULOSKELETAL NEGATIVE: 1
GASTROINTESTINAL NEGATIVE: 1
CONSTITUTIONAL NEGATIVE: 1
NEUROLOGICAL NEGATIVE: 1

## 2025-03-03 ASSESSMENT — PAIN SCALES - GENERAL: PAINLEVEL_OUTOF10: 0-NO PAIN

## 2025-03-03 NOTE — PROGRESS NOTES
Subjective   Patient ID: Alexandria Rai is a 84 y.o. female who presents for Cough (PT C/O ONGOING COUGH ON AND OFF X 1 YEAR. PT  REPORTS SHE WAS SEEN AND TREATED IN JANUARY FOR COUGH, IT RESOLVED AND CAME BACK A WEEK AGO. PT HAD PNEUMONIA TWICE LAST YEAR. ).    HPI Ojfrancine pt is  her with her son who is tranlating. Having increase cough and sputum at night over past 2 weeks, thinks related to weather changes. Has not had CT chest yet. Son reports patient will be traveling to [facility] in 2 weeks to stay with his brother as son will be going out of country for period of time. She will need 90 day prescriptions. No fevers, no malise or weight loss     Review of Systems   Constitutional: Negative.    HENT: Negative.     Respiratory:  Positive for cough.    Cardiovascular:  Positive for chest pain.   Gastrointestinal: Negative.    Genitourinary: Negative.    Musculoskeletal: Negative.    Neurological: Negative.        Objective   /80   Pulse 85   Temp 36.8 °C (98.2 °F)   Resp 18   Ht 1.524 m (5')   Wt 67.1 kg (148 lb)   SpO2 97%   BMI 28.90 kg/m²     Physical Exam  Constitutional:       General: She is not in acute distress.     Appearance: Normal appearance.   HENT:      Right Ear: Tympanic membrane normal.      Left Ear: Tympanic membrane normal.      Nose: Nose normal.      Mouth/Throat:      Mouth: Mucous membranes are moist.   Cardiovascular:      Rate and Rhythm: Normal rate and regular rhythm.      Heart sounds: No murmur heard.  Pulmonary:      Breath sounds: Normal breath sounds. No wheezing.   Abdominal:      Palpations: Abdomen is soft.   Skin:     General: Skin is warm.   Neurological:      General: No focal deficit present.      Mental Status: She is alert.   Psychiatric:         Mood and Affect: Mood normal.         Assessment/Plan   Problem List Items Addressed This Visit             ICD-10-CM    Allergic conjunctivitis of both eyes H10.13    Relevant Medications    azelastine  (Optivar) 0.05 % ophthalmic solution     Other Visit Diagnoses         Codes    Recurrent pneumonia    -  Primary J18.9    Relevant Medications    amoxicillin-pot clavulanate (Augmentin) 875-125 mg tablet    benzonatate (Tessalon) 100 mg capsule    albuterol 2.5 mg /3 mL (0.083 %) nebulizer solution    Other Relevant Orders    XR chest 2 views    [facility] MISCELLANEOUS TEST (REFRIGERATED)    Other cough     R05.8    Relevant Medications    amoxicillin-pot clavulanate (Augmentin) 875-125 mg tablet    benzonatate (Tessalon) 100 mg capsule    albuterol 2.5 mg /3 mL (0.083 %) nebulizer solution    Other Relevant Orders    XR chest 2 views    [facility] MISCELLANEOUS TEST (REFRIGERATED)    Wheezing     R06.2

## 2025-03-04 DIAGNOSIS — J18.9 RECURRENT PNEUMONIA: ICD-10-CM

## 2025-03-04 DIAGNOSIS — R93.89 ABNORMAL CHEST X-RAY: Primary | ICD-10-CM

## 2025-03-04 LAB — QUEST FLEXITEST2 RESULTS:: NORMAL

## 2025-03-04 PROCEDURE — RXMED WILLOW AMBULATORY MEDICATION CHARGE

## 2025-03-04 RX ORDER — AZITHROMYCIN 250 MG/1
TABLET, FILM COATED ORAL
Qty: 6 TABLET | Refills: 0 | Status: SHIPPED | OUTPATIENT
Start: 2025-03-04 | End: 2025-03-09

## 2025-03-05 ENCOUNTER — HOSPITAL ENCOUNTER (OUTPATIENT)
Dept: RADIOLOGY | Facility: HOSPITAL | Age: 85
Discharge: HOME | End: 2025-03-05
Payer: COMMERCIAL

## 2025-03-05 DIAGNOSIS — J18.9 RECURRENT PNEUMONIA: ICD-10-CM

## 2025-03-05 DIAGNOSIS — R93.89 ABNORMAL CHEST X-RAY: ICD-10-CM

## 2025-03-05 PROCEDURE — 71250 CT THORAX DX C-: CPT

## 2025-03-10 ENCOUNTER — PHARMACY VISIT (OUTPATIENT)
Dept: PHARMACY | Facility: CLINIC | Age: 85
End: 2025-03-10
Payer: COMMERCIAL

## 2025-03-10 PROCEDURE — RXMED WILLOW AMBULATORY MEDICATION CHARGE

## 2025-03-11 ENCOUNTER — TELEPHONE (OUTPATIENT)
Dept: PRIMARY CARE | Facility: CLINIC | Age: 85
End: 2025-03-11
Payer: COMMERCIAL

## 2025-03-11 DIAGNOSIS — R91.8 SHADOW OF LUNG: ICD-10-CM

## 2025-03-11 NOTE — TELEPHONE ENCOUNTER
----- Message from Shraddha Mason sent at 3/6/2025  5:45 PM EST -----  Call pt son. Ground glass appearance noted from prev xray and CT 3/2024 recommend consult pulmonology and bronchoscopy to be scheduled within next 3 months if possible Refer to Dr Barreto

## 2025-06-30 PROCEDURE — RXMED WILLOW AMBULATORY MEDICATION CHARGE

## 2025-07-03 ENCOUNTER — PHARMACY VISIT (OUTPATIENT)
Dept: PHARMACY | Facility: CLINIC | Age: 85
End: 2025-07-03
Payer: COMMERCIAL

## 2025-07-03 PROCEDURE — RXMED WILLOW AMBULATORY MEDICATION CHARGE

## 2025-07-19 PROCEDURE — RXMED WILLOW AMBULATORY MEDICATION CHARGE

## 2025-07-22 ENCOUNTER — PHARMACY VISIT (OUTPATIENT)
Dept: PHARMACY | Facility: CLINIC | Age: 85
End: 2025-07-22
Payer: COMMERCIAL

## 2025-07-31 ENCOUNTER — TELEPHONE (OUTPATIENT)
Dept: PRIMARY CARE | Facility: CLINIC | Age: 85
End: 2025-07-31
Payer: COMMERCIAL

## 2025-07-31 DIAGNOSIS — R68.89 ACTIVITY INTOLERANCE: ICD-10-CM

## 2025-07-31 DIAGNOSIS — Z96.651 S/P TOTAL KNEE ARTHROPLASTY, RIGHT: Primary | ICD-10-CM

## 2025-07-31 DIAGNOSIS — R26.9 ABNORMAL GAIT: ICD-10-CM

## 2025-07-31 DIAGNOSIS — M51.379 DEGENERATIVE DISC DISEASE AT L5-S1 LEVEL: ICD-10-CM

## (undated) DEVICE — DRAPE, SHEET, U, W/ADHESIVE STRIP, IMPERVIOUS, 60 X 70 IN, DISPOSABLE, LF, STERILE

## (undated) DEVICE — BANDAGE, COFLEX, 6 X 5 YDS, TAN, STERILE, LF

## (undated) DEVICE — BLADE, SAW, SAGITTAL, 21 X 84.5 X 0.89 MM, STAINLESS STEEL, STERILE

## (undated) DEVICE — GLOVE, SURGICAL, PROTEXIS PI ORTHO, 8.0, PF, LF

## (undated) DEVICE — GLOVE, SURGICAL, PROTEXIS PI BLUE W/NEUTHERA, 8.0, PF, LF

## (undated) DEVICE — SKIN CLOSURE SYS, PREMIERPRO EXOFIN, 1-4CM X 22CM, 1.75G TUBE

## (undated) DEVICE — BANDAGE, ELASTIC, ACE, ACE, DOUBLE LENGTH, 6 X 550 IN, LF

## (undated) DEVICE — DRAPE, INCISE, ANTIMICROBIAL, IOBAN 2, LARGE, 17 X 23 IN, DISPOSABLE, STERILE

## (undated) DEVICE — WOUND SYSTEM, DEBRIDEMENT & CLEANING, O.R DUOPAK

## (undated) DEVICE — TUBING, IRRIGATION, HIGH FLOW, HAND PIECE SET

## (undated) DEVICE — SOLUTION, INJECTION, SODIUM CHLORIDE 9%, 3000ML

## (undated) DEVICE — SUTURE, STRATAFIX, SYMMETRIC PDS PLUS, SIZE 1, 12IN, VIOLET. CT1 36MM

## (undated) DEVICE — BLADE, RECIPROCATING, LARGE, 12.7MM

## (undated) DEVICE — HOOD, SURGICAL, FLYTE, T7 PLUS, PEEL AWAY SHIELD

## (undated) DEVICE — Device

## (undated) DEVICE — DRAPE, INSTRUMENT, W/POUCH, STERI DRAPE, 7 X 11 IN, DISPOSABLE, STERILE

## (undated) DEVICE — SUTURE, MONOCRYL, 2-0, 36 IN, CT-1, UNDYED

## (undated) DEVICE — SOLUTION, POVIDONE IODINE 10%, 0.75 OZ, NS

## (undated) DEVICE — MARKER, SURGICAL, SKIN, STANDARD, W/RULER, LF

## (undated) DEVICE — PAD, KNEE PATIENT, DEMAYO, DISP, STERILE

## (undated) DEVICE — HOOD, STERISHIELD T4 SYSTEM

## (undated) DEVICE — TIP, SUCTION, FRAZIER, W/CONTROL VENT, 12 FR

## (undated) DEVICE — DRESSING, MEPILEX BORDER, POST-OP AG, 4 X 10 IN

## (undated) DEVICE — GLOVE, SURGICAL, PROTEXIS PI ORTHO, 7.5, PF, LF

## (undated) DEVICE — SUTURE, VICRYL, 1, 27 IN, CT-1, VIOLET

## (undated) DEVICE — CEMENT, MIXEVAC III, 10S BOWL, KNEES

## (undated) DEVICE — BLADE, SAW, SAGITTAL, 18.0 X 1.27 X 90MM

## (undated) DEVICE — MIXER, CEMENT, MIXEVAC III HIGH VACUUM KIT, STERILE

## (undated) DEVICE — CLOSURE SYSTEM, DERMABOND, PRINEO, 22CM, STERILE